# Patient Record
Sex: MALE | Race: BLACK OR AFRICAN AMERICAN | NOT HISPANIC OR LATINO
[De-identification: names, ages, dates, MRNs, and addresses within clinical notes are randomized per-mention and may not be internally consistent; named-entity substitution may affect disease eponyms.]

---

## 2020-07-21 ENCOUNTER — APPOINTMENT (OUTPATIENT)
Dept: UROLOGY | Facility: CLINIC | Age: 40
End: 2020-07-21

## 2020-09-02 ENCOUNTER — APPOINTMENT (OUTPATIENT)
Dept: UROLOGY | Facility: CLINIC | Age: 40
End: 2020-09-02
Payer: MEDICAID

## 2020-09-02 VITALS
SYSTOLIC BLOOD PRESSURE: 126 MMHG | TEMPERATURE: 97.8 F | HEART RATE: 111 BPM | DIASTOLIC BLOOD PRESSURE: 84 MMHG | WEIGHT: 129 LBS | HEIGHT: 70 IN | BODY MASS INDEX: 18.47 KG/M2

## 2020-09-02 DIAGNOSIS — Z80.9 FAMILY HISTORY OF MALIGNANT NEOPLASM, UNSPECIFIED: ICD-10-CM

## 2020-09-02 DIAGNOSIS — Z87.19 PERSONAL HISTORY OF OTHER DISEASES OF THE DIGESTIVE SYSTEM: ICD-10-CM

## 2020-09-02 DIAGNOSIS — Z87.39 PERSONAL HISTORY OF OTHER DISEASES OF THE MUSCULOSKELETAL SYSTEM AND CONNECTIVE TISSUE: ICD-10-CM

## 2020-09-02 DIAGNOSIS — Z78.9 OTHER SPECIFIED HEALTH STATUS: ICD-10-CM

## 2020-09-02 PROCEDURE — 99203 OFFICE O/P NEW LOW 30 MIN: CPT

## 2020-09-02 NOTE — HISTORY OF PRESENT ILLNESS
[FreeTextEntry1] : cc elevated psa \par 41 yo male referredfor eval elevated psa \par [id number]\par ha shad elevated psa in past in setting of uti \par no voidng complaints \par no fam h/o prostate ca\par per pmd h/o etoh abuse\par 1mo ago\par 5yr ago\par  PSA, Total Screen, Medicare <=4.0 ng/mL 14.3  6.8  \par

## 2020-09-02 NOTE — REVIEW OF SYSTEMS
[Chills] : chills [Recent Weight Loss (___ Lbs)] : recent [unfilled] ~Ulb weight loss [Feeling Tired] : feeling tired [Eyesight Problems] : eyesight problems [Dry Eyes] : dryness of the eyes [Earache] : earache [Chest Pain] : chest pain [Heart Rate Is Fast] : fast heart rate [Shortness Of Breath] : shortness of breath [Abdominal Pain] : abdominal pain [Cough] : cough [Vomiting] : vomiting [Constipation] : constipation [Sexually Transmitted Disease (STD)] : sexually transmitted disease [see HPI] : see HPI [Diarrhea] : diarrhea [Heartburn] : heartburn [Poor quality erections] : Poor quality erections [Strain or push to urinate] : strain or push to urinate [Anxiety] : anxiety [Wait a long time to urinate] : waits a long time to urinate [Easy Bruising] : a tendency for easy bruising [Negative] : Endocrine

## 2020-09-02 NOTE — ASSESSMENT
[FreeTextEntry1] : nl exam \par given h/o of elevated psa in setting og +ucx will check ua cx and repeat psa

## 2020-09-08 LAB
APPEARANCE: CLEAR
BACTERIA UR CULT: NORMAL
BACTERIA: NEGATIVE
BILIRUBIN URINE: NEGATIVE
BLOOD URINE: NEGATIVE
COLOR: YELLOW
GLUCOSE QUALITATIVE U: NEGATIVE
HYALINE CASTS: 0 /LPF
KETONES URINE: NEGATIVE
LEUKOCYTE ESTERASE URINE: ABNORMAL
MICROSCOPIC-UA: NORMAL
NITRITE URINE: NEGATIVE
PH URINE: 6.5
PROTEIN URINE: NEGATIVE
PSA FREE FLD-MCNC: 6 %
PSA FREE SERPL-MCNC: 0.63 NG/ML
PSA SERPL-MCNC: 11.2 NG/ML
RED BLOOD CELLS URINE: 1 /HPF
SPECIFIC GRAVITY URINE: 1.01
SQUAMOUS EPITHELIAL CELLS: 0 /HPF
UROBILINOGEN URINE: NORMAL
WHITE BLOOD CELLS URINE: 4 /HPF

## 2020-09-18 ENCOUNTER — APPOINTMENT (OUTPATIENT)
Dept: UROLOGY | Facility: CLINIC | Age: 40
End: 2020-09-18
Payer: MEDICAID

## 2020-09-18 PROCEDURE — 55700: CPT

## 2020-09-18 PROCEDURE — 76872 US TRANSRECTAL: CPT

## 2020-09-18 PROCEDURE — 76942 ECHO GUIDE FOR BIOPSY: CPT | Mod: 59

## 2020-09-25 ENCOUNTER — APPOINTMENT (OUTPATIENT)
Dept: UROLOGY | Facility: CLINIC | Age: 40
End: 2020-09-25
Payer: MEDICAID

## 2020-09-25 PROCEDURE — 99214 OFFICE O/P EST MOD 30 MIN: CPT

## 2020-09-25 NOTE — HISTORY OF PRESENT ILLNESS
[FreeTextEntry1] : s/p prostate biopsy \par doing well\par no voiding complaints \par no fever \par \par

## 2020-09-25 NOTE — ASSESSMENT
[FreeTextEntry1] : We had a long discussion with the patient explaining the diagnosis of prostate cancer and the meaning of his Ara Grade. We discussed all options of therapy, including observation, radiation therapy, endocrine therapy, and radical surgical extirpation.\par We discussed tumor progression and the natural course of prostate cancer, including metastases, especially to bone, and that observation would allow his prostate cancer to progress. We discussed radiation therapy, both external beam and interstitial radioactive seeds, and the morbidity involved, including cystitis, proctitis, urinary and incontinence and erectile dysfunction. We discussed endocrine therapy, that it would likely slow but not stop cancer progression, and the results of androgen ablation, including but not limited to bone density loss, muscle mass loss, changes in libido, mood, energy and depression. We discussed surgical therapy, including surgical techniques, outcomes and potential need for subsequent additional radiation therapy if positive surgical margins, and complications including infection and blood loss, and long term complications including urinary incontinence and erectile dysfunction.Staging will be performed with mri pelvis and whole body bone scans\par  \par .\par  \par Gave him material from Prostate cancer [facility] for review. Appropriate reerral were made. reviewed importance of following up with all referrals and exams\par  \par Greater than 50% of the encounter time was spent counseling the patient and I have spent 25 minutes face to face time with the patient. All questions were answered.\par

## 2020-09-29 LAB
ANION GAP SERPL CALC-SCNC: 18 MMOL/L
BUN SERPL-MCNC: 8 MG/DL
CALCIUM SERPL-MCNC: 9 MG/DL
CHLORIDE SERPL-SCNC: 102 MMOL/L
CO2 SERPL-SCNC: 24 MMOL/L
CORE LAB BIOPSY: NORMAL
CREAT SERPL-MCNC: 0.57 MG/DL
GLUCOSE SERPL-MCNC: 85 MG/DL
POTASSIUM SERPL-SCNC: 3.8 MMOL/L
SODIUM SERPL-SCNC: 144 MMOL/L

## 2020-10-16 ENCOUNTER — APPOINTMENT (OUTPATIENT)
Dept: UROLOGY | Facility: CLINIC | Age: 40
End: 2020-10-16
Payer: MEDICAID

## 2020-10-16 VITALS — TEMPERATURE: 97.1 F

## 2020-10-16 PROCEDURE — 99214 OFFICE O/P EST MOD 30 MIN: CPT

## 2020-10-16 NOTE — HISTORY OF PRESENT ILLNESS
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

## 2020-10-16 NOTE — ASSESSMENT
[FreeTextEntry1] : reviewed mri with patient \par reviewed treatment options as, surgery, xrt\par pt reluctant to have surgery - also complicated by prior surgery \par if decides AS advise repeat biopsy given risk for understaging with mri and psa results\par will see dr ramirez to review

## 2020-11-09 ENCOUNTER — APPOINTMENT (OUTPATIENT)
Dept: UROLOGY | Facility: CLINIC | Age: 40
End: 2020-11-09
Payer: MEDICAID

## 2020-11-09 VITALS
WEIGHT: 145 LBS | TEMPERATURE: 97.9 F | HEART RATE: 112 BPM | BODY MASS INDEX: 20.3 KG/M2 | RESPIRATION RATE: 18 BRPM | DIASTOLIC BLOOD PRESSURE: 111 MMHG | SYSTOLIC BLOOD PRESSURE: 158 MMHG | HEIGHT: 71 IN

## 2020-11-09 VITALS — TEMPERATURE: 97.9 F

## 2020-11-09 DIAGNOSIS — Z80.42 FAMILY HISTORY OF MALIGNANT NEOPLASM OF PROSTATE: ICD-10-CM

## 2020-11-09 DIAGNOSIS — Z87.19 PERSONAL HISTORY OF OTHER DISEASES OF THE DIGESTIVE SYSTEM: ICD-10-CM

## 2020-11-09 PROCEDURE — 99072 ADDL SUPL MATRL&STAF TM PHE: CPT

## 2020-11-09 PROCEDURE — 99215 OFFICE O/P EST HI 40 MIN: CPT

## 2020-11-09 RX ORDER — ARTIFICIAL TEARS 1; 2; 3 MG/ML; MG/ML; MG/ML
SOLUTION/ DROPS OPHTHALMIC
Refills: 0 | Status: ACTIVE | COMMUNITY

## 2020-11-09 RX ORDER — ALBUTEROL SULFATE 90 UG/1
108 (90 BASE) AEROSOL, METERED RESPIRATORY (INHALATION)
Refills: 0 | Status: ACTIVE | COMMUNITY

## 2020-11-09 RX ORDER — LEVETIRACETAM 1000 MG/1
TABLET, FILM COATED ORAL
Refills: 0 | Status: ACTIVE | COMMUNITY

## 2020-11-09 NOTE — DISEASE MANAGEMENT
[1] : T1 [c] : c [0] : N0 [X] : MX [10-20] : 10 - 20 ng/mL [6] : [place] Score 6 [] : Patient had a Prostate MRI [I] : I [BiopsyDate] : 9/18/2020 [TotalCores] : 12 [TotalPositiveCores] : 2 [MaxCoreInvolvement] : 10% [FreeTextEntry7] : PSA at diagnosis 11.2 ng/mL.\par MRI done at [facility]: reported as PIRAD 4 but no lesion detected.

## 2020-11-09 NOTE — HISTORY OF PRESENT ILLNESS
[FreeTextEntry1] : PSA September 2020 11.2 ng/ml \par Prostate biopsy [place] 3+3, 2/12 cores, 10% max involvement \par Grandfather lung cancer. \par Great uncles all 3 had prostate cancer \par Reports that urinary stream is okay. Denies any increased urgency or frequency. Denies any hematuria, dysuria or incontinence.\par Nocturia x 2\par Denies any difficulty with erections

## 2020-11-09 NOTE — LETTER CLOSING
[Consult Closing:] : Thank you for allowing me to participate in the care of this patient.  If you have any questions, please do not hesitate to contact me. [Sincerely yours,] : Sincerely yours, [FreeTextEntry3] : Ravin Alvarado MD\par System Chief of Urology, [facility]\par  of Urology\par [employer] [facility]\par [facility] \par [address] [place] [address], [address] \[place] [address]

## 2020-11-09 NOTE — DISEASE MANAGEMENT
[1] : T1 [c] : c [0] : N0 [X] : MX [10-20] : 10 - 20 ng/mL [6] : [place] Score 6 [] : Patient had a Prostate MRI [I] : I [BiopsyDate] : 9/18/2020 [TotalCores] : 12 [TotalPositiveCores] : 2 [MaxCoreInvolvement] : 10% [FreeTextEntry7] : PSA at diagnosis 11.2 ng/mL.\par MRI done at [facility]: reported as PIRAD 4 but no lesion detected.

## 2020-11-09 NOTE — HISTORY OF PRESENT ILLNESS
[FreeTextEntry1] : PSA September 2020 11.2 ng/ml \par Prostate biopsy [place] 3+3, 2/12 cores, 10% max involvement \par Grandfather lung cancer. \par Great uncles all 3 had prostate cancer \par Reports that urinary stream is okay. Denies any increased urgency or frequency. Denies any hematuria, dysuria or incontinence.\par Nocturia x 2\par Denies any difficulty with erections

## 2020-11-09 NOTE — LETTER CLOSING
[Consult Closing:] : Thank you for allowing me to participate in the care of this patient.  If you have any questions, please do not hesitate to contact me. [Sincerely yours,] : Sincerely yours, [FreeTextEntry3] : Ravin Alvarado MD\par System Chief of Urology, [facility] [place]\par  of Urology\par [facility] at [facility]\par [facility] \par [address] [place] [address], [address] \[place] [address]

## 2020-11-09 NOTE — REASON FOR VISIT
[Follow-up Visit ___] : a follow-up visit  for [unfilled] [Consideration of Curative Therapy] : consideration of curative therapy for prostate cancer

## 2020-11-11 ENCOUNTER — APPOINTMENT (OUTPATIENT)
Dept: RADIATION ONCOLOGY | Facility: CLINIC | Age: 40
End: 2020-11-11
Payer: MEDICAID

## 2020-11-11 PROCEDURE — 99072 ADDL SUPL MATRL&STAF TM PHE: CPT

## 2020-11-11 PROCEDURE — 99205 OFFICE O/P NEW HI 60 MIN: CPT

## 2020-11-11 NOTE — HISTORY OF PRESENT ILLNESS
[FreeTextEntry1] : 41 yo male for telephone consult for discussion of radiation options for treatment of low/intermediate risk prostate cancer\par \par Presented with elevated screening PSA from primary care\par \par PSA 11.2ng/mL\par G3+3 in 2/12 cores, max core involvement 10%\par MRI done at [facility]: PIRADS4, no lesion identified, prostate size 34g, no extracapsular extension, no seminal vesicle involvement, no adenopathy.\par \par Symptoms: occasional obstructive stream, nocturia x1 per night, no constipation or diarrhoea but previous colonic resection for diverticulitis requiring a short term colostomy (now reversed), no issues with erectile function.\par \par PMH:\par Epilepsy (on Keppra)\par Diverticulitis with colonic resection requiring short term colostomy (now reversed)\par \par SH:\par Lives with mother in [place]\par Unemployed\par Current smoker\par One child, would like to have more

## 2020-11-13 ENCOUNTER — APPOINTMENT (OUTPATIENT)
Dept: RADIATION ONCOLOGY | Facility: CLINIC | Age: 40
End: 2020-11-13
Payer: MEDICAID

## 2020-11-13 ENCOUNTER — NON-APPOINTMENT (OUTPATIENT)
Age: 40
End: 2020-11-13

## 2020-11-13 PROCEDURE — 99072 ADDL SUPL MATRL&STAF TM PHE: CPT

## 2020-11-13 PROCEDURE — 99214 OFFICE O/P EST MOD 30 MIN: CPT

## 2020-11-13 RX ORDER — 70%ISOPROPYL ALCOHOL 0.7 ML/ML
70 SWAB TOPICAL
Refills: 0 | Status: ACTIVE | COMMUNITY

## 2020-11-13 NOTE — PHYSICAL EXAM
[Normal] : normoactive bowel sounds, soft and nontender, no hepatosplenomegaly or masses appreciated [Normal] : normal external genitalia without lesions and no testicular masses [de-identified] : prominent scar above left eye brow

## 2020-11-13 NOTE — REASON FOR VISIT
[Consideration of Curative Therapy] : consideration of curative therapy for [Prostate Cancer] : prostate cancer [Family Member] : family member

## 2020-11-13 NOTE — HISTORY OF PRESENT ILLNESS
[FreeTextEntry1] : 41 yo male for telephone consult for discussion of radiation options for treatment of low/intermediate risk prostate cancer\par \par Presented with elevated screening PSA from primary care\par \par PSA 11.2ng/mL in 9/2020, risen to 17.7 11/9/20 \par G3+3 in 2/12 cores, max core involvement 10%\par MRI done at [facility]: PIRADS4, no lesion identified, prostate size 34g, no extracapsular extension, no seminal vesicle involvement, no adenopathy.\par \par Symptoms: occasional obstructive stream, nocturia x1 per night, no constipation or diarrhoea but previous colonic resection for diverticulitis requiring a short term colostomy (now reversed), no issues with erectile function.\par \par PMH:\par Epilepsy (on Keppra)\par Diverticulitis with colonic resection requiring short term colostomy (now reversed)\par \par SH:\par Lives with mother in [place]\par Unemployed\par Current smoker\par One child, would like to have more

## 2020-11-13 NOTE — REVIEW OF SYSTEMS
[IPSS Score (0-40): ___] : IPSS score: [unfilled] [EPIC-CP Score (0-60): ___] : EPIC-CP score: [unfilled] [Negative] : Allergic/Immunologic [Anal Pain: Grade 0] : Anal Pain: Grade 0 [Constipation: Grade 0] : Constipation: Grade 0 [Diarrhea: Grade 0] : Diarrhea: Grade 0 [Dyspepsia: Grade 0] : Dyspepsia: Grade 0 [Dysphagia: Grade 0] : Dysphagia: Grade 0 [Esophagitis: Grade 0] : Esophagitis: Grade 0 [Fecal Incontinence: Grade 0] : Fecal Incontinence: Grade 0 [Gastroparesis: Grade 0] : Gastroparesis: Grade 0 [Nausea: Grade 0] : Nausea: Grade 0 [Proctitis: Grade 0] : Proctitis: Grade 0 [Rectal Pain: Grade 0] : Rectal Pain: Grade 0 [Small Intestinal Obstruction: Grade 0] : Small Intestinal Obstruction: Grade 0 [Vomiting: Grade 0] : Vomiting: Grade 0 [Hematuria: Grade 0] : Hematuria: Grade 0 [Urinary Incontinence: Grade 0] : Urinary Incontinence: Grade 0  [Urinary Retention: Grade 1 - Urinary, suprapubic or intermittent catheter placement not indicated; able to void with some residual] : Urinary Retention: Grade 1 - Urinary, suprapubic or intermittent catheter placement not indicated; able to void with some residual [Urinary Tract Pain: Grade 0] : Urinary Tract Pain: Grade 0 [Urinary Urgency: Grade 0] : Urinary Urgency: Grade 0 [Urinary Frequency: Grade 1 - Present] : Urinary Frequency: Grade 1 - Present [Ejaculation Disorder: Grade 1 - Diminished ejaculation] : Ejaculation Disorder: Grade 1 - Diminished ejaculation  [Erectile Dysfunction: Grade 1 - Decrease in erectile function (frequency or rigidity of erections) but intervention not indicated (e.g., medication or use of mechanical device, penile pump)] : Erectile Dysfunction: Grade 1 - Decrease in erectile function (frequency or rigidity of erections) but intervention not indicated (e.g., medication or use of mechanical device, penile pump) [FreeTextEntry8] : prostate cancer

## 2020-11-16 LAB
PSA FREE FLD-MCNC: 4 %
PSA FREE SERPL-MCNC: 0.62 NG/ML
PSA SERPL-MCNC: 17.7 NG/ML

## 2021-01-11 ENCOUNTER — OUTPATIENT (OUTPATIENT)
Dept: OUTPATIENT SERVICES | Facility: HOSPITAL | Age: 41
LOS: 1 days | End: 2021-01-11
Payer: MEDICAID

## 2021-01-11 ENCOUNTER — APPOINTMENT (OUTPATIENT)
Dept: CT IMAGING | Facility: IMAGING CENTER | Age: 41
End: 2021-01-11
Payer: MEDICAID

## 2021-01-11 DIAGNOSIS — C61 MALIGNANT NEOPLASM OF PROSTATE: ICD-10-CM

## 2021-01-11 PROCEDURE — 74177 CT ABD & PELVIS W/CONTRAST: CPT | Mod: 26

## 2021-01-11 PROCEDURE — 74177 CT ABD & PELVIS W/CONTRAST: CPT

## 2021-01-23 DIAGNOSIS — K76.9 LIVER DISEASE, UNSPECIFIED: ICD-10-CM

## 2021-03-01 ENCOUNTER — APPOINTMENT (OUTPATIENT)
Dept: NUCLEAR MEDICINE | Facility: IMAGING CENTER | Age: 41
End: 2021-03-01
Payer: MEDICAID

## 2021-03-01 ENCOUNTER — RESULT REVIEW (OUTPATIENT)
Age: 41
End: 2021-03-01

## 2021-03-01 ENCOUNTER — OUTPATIENT (OUTPATIENT)
Dept: OUTPATIENT SERVICES | Facility: HOSPITAL | Age: 41
LOS: 1 days | End: 2021-03-01
Payer: MEDICAID

## 2021-03-01 DIAGNOSIS — K76.9 LIVER DISEASE, UNSPECIFIED: ICD-10-CM

## 2021-03-01 PROCEDURE — 78306 BONE IMAGING WHOLE BODY: CPT | Mod: 26

## 2021-03-01 PROCEDURE — A9561: CPT

## 2021-03-01 PROCEDURE — 78306 BONE IMAGING WHOLE BODY: CPT

## 2021-03-06 ENCOUNTER — APPOINTMENT (OUTPATIENT)
Dept: CT IMAGING | Facility: IMAGING CENTER | Age: 41
End: 2021-03-06

## 2021-03-10 ENCOUNTER — OUTPATIENT (OUTPATIENT)
Dept: OUTPATIENT SERVICES | Facility: HOSPITAL | Age: 41
LOS: 1 days | End: 2021-03-10
Payer: MEDICAID

## 2021-03-10 ENCOUNTER — APPOINTMENT (OUTPATIENT)
Dept: MRI IMAGING | Facility: CLINIC | Age: 41
End: 2021-03-10
Payer: MEDICAID

## 2021-03-10 ENCOUNTER — RESULT REVIEW (OUTPATIENT)
Age: 41
End: 2021-03-10

## 2021-03-10 DIAGNOSIS — K76.9 LIVER DISEASE, UNSPECIFIED: ICD-10-CM

## 2021-03-10 PROCEDURE — 74183 MRI ABD W/O CNTR FLWD CNTR: CPT | Mod: 26

## 2021-03-10 PROCEDURE — 74183 MRI ABD W/O CNTR FLWD CNTR: CPT

## 2021-03-10 PROCEDURE — A9585: CPT

## 2021-03-12 DIAGNOSIS — Z87.898 PERSONAL HISTORY OF OTHER SPECIFIED CONDITIONS: ICD-10-CM

## 2021-03-18 ENCOUNTER — APPOINTMENT (OUTPATIENT)
Dept: UROLOGY | Facility: CLINIC | Age: 41
End: 2021-03-18
Payer: MEDICAID

## 2021-03-18 VITALS
RESPIRATION RATE: 18 BRPM | HEIGHT: 71 IN | BODY MASS INDEX: 20.3 KG/M2 | WEIGHT: 145 LBS | TEMPERATURE: 97.2 F | DIASTOLIC BLOOD PRESSURE: 80 MMHG | SYSTOLIC BLOOD PRESSURE: 120 MMHG | HEART RATE: 99 BPM

## 2021-03-18 PROCEDURE — 99072 ADDL SUPL MATRL&STAF TM PHE: CPT

## 2021-03-18 PROCEDURE — 99215 OFFICE O/P EST HI 40 MIN: CPT

## 2021-03-18 NOTE — HISTORY OF PRESENT ILLNESS
[FreeTextEntry1] : PSA September 2020 11.2 ng/ml \par Prostate biopsy [place] 3+3, 2/12 cores, 10% max involvement \par Grandfather lung cancer. \par Great uncles all 3 had prostate cancer \par Repeat PSA Nov 2020: 17.7 ng/mL\par \par Metastatic workup completed now, including CT and bone scan. No evidence of adenopathy or metastasis.\par \par Reports that urinary stream is okay. Denies any increased urgency or frequency. Denies any hematuria, dysuria or incontinence. Nocturia x 2.\par Denies any difficulty with erections

## 2021-03-18 NOTE — PHYSICAL EXAM
[General Appearance - Well Developed] : well developed [General Appearance - Well Nourished] : well nourished [Normal Appearance] : normal appearance [Well Groomed] : well groomed [General Appearance - In No Acute Distress] : no acute distress [Abdomen Soft] : soft [Abdomen Tenderness] : non-tender [Costovertebral Angle Tenderness] : no ~M costovertebral angle tenderness [Urinary Bladder Findings] : the bladder was normal on palpation [FreeTextEntry1] : Large midline incision with healed ostomy site.

## 2021-03-18 NOTE — ASSESSMENT
[FreeTextEntry1] : Long discussion with the patient today.  Spoke to his mom via phone.\par Reviewed imaging studies including CT scan, bone scan, MRI of the abdomen.  No evidence of metastatic disease.  Recommended the patient proceed with radical prostatectomy for definitive treatment of his prostate cancer.  We discussed the procedure as well as the potential long-term risks including stress incontinence and erectile dysfunction.  Given his prior abdominal surgery, unlikely to be able to have a minimally invasive procedure.  Therefore, recommend an open retropubic radical prostatectomy. Reviewed operative procedure, hospital stay and recovery time.  Risks of surgery discussed including risks of bleeding (both immediate and delayed), urine leak, wound/abdominal infection, adjacent organ injury (bowel/vascular), DVT/PE, and anesthetic complications.  \par \par All questions answered.  Patient agrees to proceed as recommended.\par

## 2021-04-21 LAB
ALBUMIN SERPL ELPH-MCNC: 4.8 G/DL
ALP BLD-CCNC: 60 U/L
ALT SERPL-CCNC: 10 U/L
ANION GAP SERPL CALC-SCNC: 13 MMOL/L
AST SERPL-CCNC: 23 U/L
BASOPHILS # BLD AUTO: 0.03 K/UL
BASOPHILS NFR BLD AUTO: 0.8 %
BILIRUB SERPL-MCNC: 0.5 MG/DL
BUN SERPL-MCNC: 6 MG/DL
CALCIUM SERPL-MCNC: 9 MG/DL
CHLORIDE SERPL-SCNC: 101 MMOL/L
CO2 SERPL-SCNC: 27 MMOL/L
CREAT SERPL-MCNC: 0.51 MG/DL
EOSINOPHIL # BLD AUTO: 0.08 K/UL
EOSINOPHIL NFR BLD AUTO: 2.1 %
GLUCOSE SERPL-MCNC: 90 MG/DL
HCT VFR BLD CALC: 38.5 %
HGB BLD-MCNC: 12.2 G/DL
IMM GRANULOCYTES NFR BLD AUTO: 0.3 %
LYMPHOCYTES # BLD AUTO: 0.91 K/UL
LYMPHOCYTES NFR BLD AUTO: 24 %
MAN DIFF?: NORMAL
MCHC RBC-ENTMCNC: 29.9 PG
MCHC RBC-ENTMCNC: 31.7 GM/DL
MCV RBC AUTO: 94.4 FL
MONOCYTES # BLD AUTO: 0.53 K/UL
MONOCYTES NFR BLD AUTO: 14 %
NEUTROPHILS # BLD AUTO: 2.23 K/UL
NEUTROPHILS NFR BLD AUTO: 58.8 %
PLATELET # BLD AUTO: 283 K/UL
POTASSIUM SERPL-SCNC: 4.3 MMOL/L
PROT SERPL-MCNC: 7.3 G/DL
PSA SERPL-MCNC: 14.4 NG/ML
RBC # BLD: 4.08 M/UL
RBC # FLD: 14.9 %
SODIUM SERPL-SCNC: 141 MMOL/L
TESTOST SERPL-MCNC: 351 NG/DL
WBC # FLD AUTO: 3.79 K/UL

## 2021-05-20 ENCOUNTER — OUTPATIENT (OUTPATIENT)
Dept: OUTPATIENT SERVICES | Facility: HOSPITAL | Age: 41
LOS: 1 days | End: 2021-05-20
Payer: MEDICAID

## 2021-05-20 VITALS
DIASTOLIC BLOOD PRESSURE: 80 MMHG | TEMPERATURE: 97 F | WEIGHT: 128.09 LBS | SYSTOLIC BLOOD PRESSURE: 110 MMHG | RESPIRATION RATE: 16 BRPM | HEIGHT: 70 IN | OXYGEN SATURATION: 98 % | HEART RATE: 90 BPM

## 2021-05-20 DIAGNOSIS — J45.909 UNSPECIFIED ASTHMA, UNCOMPLICATED: ICD-10-CM

## 2021-05-20 DIAGNOSIS — C61 MALIGNANT NEOPLASM OF PROSTATE: ICD-10-CM

## 2021-05-20 DIAGNOSIS — I10 ESSENTIAL (PRIMARY) HYPERTENSION: ICD-10-CM

## 2021-05-20 DIAGNOSIS — Z98.890 OTHER SPECIFIED POSTPROCEDURAL STATES: Chronic | ICD-10-CM

## 2021-05-20 DIAGNOSIS — Z20.822 CONTACT WITH AND (SUSPECTED) EXPOSURE TO COVID-19: ICD-10-CM

## 2021-05-20 DIAGNOSIS — Z87.828 PERSONAL HISTORY OF OTHER (HEALED) PHYSICAL INJURY AND TRAUMA: Chronic | ICD-10-CM

## 2021-05-20 DIAGNOSIS — R56.9 UNSPECIFIED CONVULSIONS: ICD-10-CM

## 2021-05-20 DIAGNOSIS — F10.10 ALCOHOL ABUSE, UNCOMPLICATED: ICD-10-CM

## 2021-05-20 LAB
ALBUMIN SERPL ELPH-MCNC: 4.8 G/DL — SIGNIFICANT CHANGE UP (ref 3.3–5)
ALP SERPL-CCNC: 74 U/L — SIGNIFICANT CHANGE UP (ref 40–120)
ALT FLD-CCNC: 18 U/L — SIGNIFICANT CHANGE UP (ref 4–41)
ANION GAP SERPL CALC-SCNC: 22 MMOL/L — HIGH (ref 7–14)
AST SERPL-CCNC: 83 U/L — HIGH (ref 4–40)
BILIRUB SERPL-MCNC: 0.8 MG/DL — SIGNIFICANT CHANGE UP (ref 0.2–1.2)
BLD GP AB SCN SERPL QL: NEGATIVE — SIGNIFICANT CHANGE UP
BUN SERPL-MCNC: 10 MG/DL — SIGNIFICANT CHANGE UP (ref 7–23)
CALCIUM SERPL-MCNC: 9.1 MG/DL — SIGNIFICANT CHANGE UP (ref 8.4–10.5)
CHLORIDE SERPL-SCNC: 98 MMOL/L — SIGNIFICANT CHANGE UP (ref 98–107)
CO2 SERPL-SCNC: 20 MMOL/L — LOW (ref 22–31)
CREAT SERPL-MCNC: 0.56 MG/DL — SIGNIFICANT CHANGE UP (ref 0.5–1.3)
GLUCOSE SERPL-MCNC: 69 MG/DL — LOW (ref 70–99)
HCT VFR BLD CALC: 36.2 % — LOW (ref 39–50)
HGB BLD-MCNC: 11.8 G/DL — LOW (ref 13–17)
MCHC RBC-ENTMCNC: 28.9 PG — SIGNIFICANT CHANGE UP (ref 27–34)
MCHC RBC-ENTMCNC: 32.6 GM/DL — SIGNIFICANT CHANGE UP (ref 32–36)
MCV RBC AUTO: 88.7 FL — SIGNIFICANT CHANGE UP (ref 80–100)
NRBC # BLD: 0 /100 WBCS — SIGNIFICANT CHANGE UP
NRBC # FLD: 0 K/UL — SIGNIFICANT CHANGE UP
PLATELET # BLD AUTO: 287 K/UL — SIGNIFICANT CHANGE UP (ref 150–400)
POTASSIUM SERPL-MCNC: 3.6 MMOL/L — SIGNIFICANT CHANGE UP (ref 3.5–5.3)
POTASSIUM SERPL-SCNC: 3.6 MMOL/L — SIGNIFICANT CHANGE UP (ref 3.5–5.3)
PROT SERPL-MCNC: 8 G/DL — SIGNIFICANT CHANGE UP (ref 6–8.3)
RBC # BLD: 4.08 M/UL — LOW (ref 4.2–5.8)
RBC # FLD: 15.5 % — HIGH (ref 10.3–14.5)
RH IG SCN BLD-IMP: POSITIVE — SIGNIFICANT CHANGE UP
SODIUM SERPL-SCNC: 140 MMOL/L — SIGNIFICANT CHANGE UP (ref 135–145)
WBC # BLD: 3.49 K/UL — LOW (ref 3.8–10.5)
WBC # FLD AUTO: 3.49 K/UL — LOW (ref 3.8–10.5)

## 2021-05-20 PROCEDURE — 93010 ELECTROCARDIOGRAM REPORT: CPT

## 2021-05-20 NOTE — H&P PST ADULT - NSANTHOSAYNRD_GEN_A_CORE
No. ESTEFANI screening performed.  STOP BANG Legend: 0-2 = LOW Risk; 3-4 = INTERMEDIATE Risk; 5-8 = HIGH Risk

## 2021-05-20 NOTE — H&P PST ADULT - NSICDXPASTMEDICALHX_GEN_ALL_CORE_FT
PAST MEDICAL HISTORY:  Asthma     Malignant neoplasm of prostate     Seizure      PAST MEDICAL HISTORY:  Asthma     Hypertension     Malignant neoplasm of prostate     Seizure

## 2021-05-20 NOTE — H&P PST ADULT - HISTORY OF PRESENT ILLNESS
42 yo male presents to [facility] for preop evaluation for open radical retropubic prostatectomy, pelvic lymph node dissection.  Patient  with elevated PSA levels s/p prostate biopsy and diagnosed with malignant neoplasm of prostate.  Patient reports increased urinary frequency, denies dysuria or hematuria.

## 2021-05-20 NOTE — H&P PST ADULT - NSICDXPROBLEM_GEN_ALL_CORE_FT
PROBLEM DIAGNOSES  Problem: Malignant neoplasm of prostate  Assessment and Plan: Patient scheduled for open radical retropubic prostatectomy, pelvic lymph node dissection on 6/4/2021  Written & verbal preop instructions, gi prophylaxis & surgical soap given  Pt verbalized good understanding.  Teach back done on surgical soap instructions.   Patient had preop Medical evaluation with PCP, pending copy of report.     Problem: Seizures  Assessment and Plan: Patient instructed to take Keppra on AM of surgery    Problem: Asthma  Assessment and Plan: Patient instructed to take Albuterol as needed on AM of surgery       PROBLEM DIAGNOSES  Problem: Malignant neoplasm of prostate  Assessment and Plan: Patient scheduled for open radical retropubic prostatectomy, pelvic lymph node dissection on 6/4/2021  Written & verbal preop instructions, gi prophylaxis & surgical soap given  Pt verbalized good understanding.  Teach back done on surgical soap instructions.   Patient has preop Medical evaluation with PCP, pending copy of report.     Problem: Seizures  Assessment and Plan: Patient instructed to take Keppra on AM of surgery    Problem: Asthma  Assessment and Plan: Patient instructed to take Albuterol as needed on AM of surgery    Problem: Hypertension  Assessment and Plan: Patient instructed to take Metoprolol as per routine schedule    Problem: Excessive drinking alcohol  Assessment and Plan: Patient reports alcohol consumption daily 2 drinks of liquor, PCP notified, patient declines  consult    Problem: Encounter for screening laboratory testing for COVID-19 virus  Assessment and Plan: Patient aware of need for COVID testing prior to procedure and advised to co ordinate with surgeon.        PROBLEM DIAGNOSES  Problem: Malignant neoplasm of prostate  Assessment and Plan: Patient scheduled for open radical retropubic prostatectomy, pelvic lymph node dissection on 6/4/2021  Written & verbal preop instructions, gi prophylaxis & surgical soap given  Pt verbalized good understanding.  Teach back done on surgical soap instructions.   Patient has preop Medical evaluation with PCP, pending copy of report.   ESTEFANI precaution, OR booking notified    Problem: Seizures  Assessment and Plan: Patient instructed to take Keppra on AM of surgery    Problem: Asthma  Assessment and Plan: Patient instructed to take Albuterol as needed on AM of surgery    Problem: Hypertension  Assessment and Plan: Patient instructed to take Metoprolol as per routine schedule    Problem: Excessive drinking alcohol  Assessment and Plan: Patient reports alcohol consumption daily 2 drinks of liquor, PCP notified, patient declines  consult    Problem: Encounter for screening laboratory testing for COVID-19 virus  Assessment and Plan: Patient aware of need for COVID testing prior to procedure and advised to co ordinate with surgeon.

## 2021-05-20 NOTE — H&P PST ADULT - NSICDXPASTSURGICALHX_GEN_ALL_CORE_FT
PAST SURGICAL HISTORY:  H/O laceration of skin     History of colon surgery     S/P hernia surgery

## 2021-05-20 NOTE — H&P PST ADULT - NSALCOHOLMD_GEN_A_CORE_SD
spoke with nurse Crandall from Dr. Lyles's office will notify physician, patient declines social work consult/Name of MD Notified: (Please Specify)

## 2021-05-20 NOTE — H&P PST ADULT - RESPIRATORY AND THORAX COMMENTS
pt with history fo asthma pt with history fo asthma well controlled on medication, no recent exacerbation

## 2021-05-20 NOTE — H&P PST ADULT - NEGATIVE NEUROLOGICAL SYMPTOMS
no transient paralysis/no weakness/no paresthesias/no generalized seizures/no focal seizures/no syncope/no tremors/no vertigo/no difficulty walking/no headache/no loss of consciousness

## 2021-05-28 ENCOUNTER — OUTPATIENT (OUTPATIENT)
Dept: OUTPATIENT SERVICES | Facility: HOSPITAL | Age: 41
LOS: 1 days | End: 2021-05-28

## 2021-05-28 DIAGNOSIS — C61 MALIGNANT NEOPLASM OF PROSTATE: ICD-10-CM

## 2021-05-28 DIAGNOSIS — Z98.890 OTHER SPECIFIED POSTPROCEDURAL STATES: Chronic | ICD-10-CM

## 2021-05-28 DIAGNOSIS — Z87.828 PERSONAL HISTORY OF OTHER (HEALED) PHYSICAL INJURY AND TRAUMA: Chronic | ICD-10-CM

## 2021-05-28 LAB
APTT BLD: 36.1 SEC — SIGNIFICANT CHANGE UP (ref 27–36.3)
INR BLD: 1.11 RATIO — SIGNIFICANT CHANGE UP (ref 0.88–1.16)
PROTHROM AB SERPL-ACNC: 12.6 SEC — SIGNIFICANT CHANGE UP (ref 10.6–13.6)

## 2021-05-31 DIAGNOSIS — Z01.818 ENCOUNTER FOR OTHER PREPROCEDURAL EXAMINATION: ICD-10-CM

## 2021-06-01 ENCOUNTER — OUTPATIENT (OUTPATIENT)
Dept: OUTPATIENT SERVICES | Facility: HOSPITAL | Age: 41
LOS: 1 days | End: 2021-06-01
Payer: MEDICAID

## 2021-06-01 ENCOUNTER — APPOINTMENT (OUTPATIENT)
Dept: DISASTER EMERGENCY | Facility: CLINIC | Age: 41
End: 2021-06-01

## 2021-06-01 DIAGNOSIS — Z87.828 PERSONAL HISTORY OF OTHER (HEALED) PHYSICAL INJURY AND TRAUMA: Chronic | ICD-10-CM

## 2021-06-01 DIAGNOSIS — Z98.890 OTHER SPECIFIED POSTPROCEDURAL STATES: Chronic | ICD-10-CM

## 2021-06-02 LAB — SARS-COV-2 N GENE NPH QL NAA+PROBE: NOT DETECTED

## 2021-06-03 ENCOUNTER — TRANSCRIPTION ENCOUNTER (OUTPATIENT)
Age: 41
End: 2021-06-03

## 2021-06-03 NOTE — ASU PATIENT PROFILE, ADULT - VISION (WITH CORRECTIVE LENSES IF THE PATIENT USUALLY WEARS THEM):
To get better and follow your care plan as instructed. Normal vision: sees adequately in most situations; can see medication labels, newsprint

## 2021-06-04 ENCOUNTER — APPOINTMENT (OUTPATIENT)
Dept: UROLOGY | Facility: HOSPITAL | Age: 41
End: 2021-06-04

## 2021-06-04 ENCOUNTER — INPATIENT (INPATIENT)
Facility: HOSPITAL | Age: 41
LOS: 7 days | Discharge: LEFT BEFORE DISCHARGE | End: 2021-06-12
Attending: UROLOGY | Admitting: UROLOGY
Payer: MEDICAID

## 2021-06-04 ENCOUNTER — RESULT REVIEW (OUTPATIENT)
Age: 41
End: 2021-06-04

## 2021-06-04 VITALS
RESPIRATION RATE: 16 BRPM | DIASTOLIC BLOOD PRESSURE: 88 MMHG | HEIGHT: 70 IN | OXYGEN SATURATION: 95 % | HEART RATE: 95 BPM | SYSTOLIC BLOOD PRESSURE: 108 MMHG | WEIGHT: 128.09 LBS | TEMPERATURE: 98 F

## 2021-06-04 DIAGNOSIS — Z98.890 OTHER SPECIFIED POSTPROCEDURAL STATES: Chronic | ICD-10-CM

## 2021-06-04 DIAGNOSIS — Z87.828 PERSONAL HISTORY OF OTHER (HEALED) PHYSICAL INJURY AND TRAUMA: Chronic | ICD-10-CM

## 2021-06-04 DIAGNOSIS — C61 MALIGNANT NEOPLASM OF PROSTATE: ICD-10-CM

## 2021-06-04 LAB
ALBUMIN SERPL ELPH-MCNC: 4.1 G/DL — SIGNIFICANT CHANGE UP (ref 3.3–5)
ALP SERPL-CCNC: 71 U/L — SIGNIFICANT CHANGE UP (ref 40–120)
ALT FLD-CCNC: 20 U/L — SIGNIFICANT CHANGE UP (ref 4–41)
ANION GAP SERPL CALC-SCNC: 22 MMOL/L — HIGH (ref 7–14)
ANION GAP SERPL CALC-SCNC: 23 MMOL/L — HIGH (ref 7–14)
APTT BLD: 31.1 SEC — SIGNIFICANT CHANGE UP (ref 27–36.3)
AST SERPL-CCNC: 64 U/L — HIGH (ref 4–40)
BASOPHILS # BLD AUTO: 0 K/UL — SIGNIFICANT CHANGE UP (ref 0–0.2)
BASOPHILS NFR BLD AUTO: 0 % — SIGNIFICANT CHANGE UP (ref 0–2)
BILIRUB SERPL-MCNC: 2.1 MG/DL — HIGH (ref 0.2–1.2)
BLOOD GAS ARTERIAL - LYTES,HGB,ICA,LACT RESULT: SIGNIFICANT CHANGE UP
BUN SERPL-MCNC: 4 MG/DL — LOW (ref 7–23)
BUN SERPL-MCNC: 4 MG/DL — LOW (ref 7–23)
CALCIUM SERPL-MCNC: 10.2 MG/DL — SIGNIFICANT CHANGE UP (ref 8.4–10.5)
CALCIUM SERPL-MCNC: 9.9 MG/DL — SIGNIFICANT CHANGE UP (ref 8.4–10.5)
CHLORIDE SERPL-SCNC: 106 MMOL/L — SIGNIFICANT CHANGE UP (ref 98–107)
CHLORIDE SERPL-SCNC: 107 MMOL/L — SIGNIFICANT CHANGE UP (ref 98–107)
CO2 SERPL-SCNC: 17 MMOL/L — LOW (ref 22–31)
CO2 SERPL-SCNC: 18 MMOL/L — LOW (ref 22–31)
CREAT SERPL-MCNC: 0.4 MG/DL — LOW (ref 0.5–1.3)
CREAT SERPL-MCNC: 0.42 MG/DL — LOW (ref 0.5–1.3)
EOSINOPHIL # BLD AUTO: 0 K/UL — SIGNIFICANT CHANGE UP (ref 0–0.5)
EOSINOPHIL NFR BLD AUTO: 0 % — SIGNIFICANT CHANGE UP (ref 0–6)
GAS PNL BLDA: SIGNIFICANT CHANGE UP
GLUCOSE SERPL-MCNC: 133 MG/DL — HIGH (ref 70–99)
GLUCOSE SERPL-MCNC: 135 MG/DL — HIGH (ref 70–99)
HCT VFR BLD CALC: 36.7 % — LOW (ref 39–50)
HCT VFR BLD CALC: 39.4 % — SIGNIFICANT CHANGE UP (ref 39–50)
HGB BLD-MCNC: 13 G/DL — SIGNIFICANT CHANGE UP (ref 13–17)
HGB BLD-MCNC: 13.4 G/DL — SIGNIFICANT CHANGE UP (ref 13–17)
IANC: 9.27 K/UL — HIGH (ref 1.5–8.5)
INR BLD: 1.2 RATIO — HIGH (ref 0.88–1.16)
LYMPHOCYTES # BLD AUTO: 0.34 K/UL — LOW (ref 1–3.3)
LYMPHOCYTES # BLD AUTO: 3.5 % — LOW (ref 13–44)
MAGNESIUM SERPL-MCNC: 0.6 MG/DL — CRITICAL LOW (ref 1.6–2.6)
MAGNESIUM SERPL-MCNC: 0.8 MG/DL — CRITICAL LOW (ref 1.6–2.6)
MCHC RBC-ENTMCNC: 28.4 PG — SIGNIFICANT CHANGE UP (ref 27–34)
MCHC RBC-ENTMCNC: 28.6 PG — SIGNIFICANT CHANGE UP (ref 27–34)
MCHC RBC-ENTMCNC: 34 GM/DL — SIGNIFICANT CHANGE UP (ref 32–36)
MCHC RBC-ENTMCNC: 35.4 GM/DL — SIGNIFICANT CHANGE UP (ref 32–36)
MCV RBC AUTO: 80.3 FL — SIGNIFICANT CHANGE UP (ref 80–100)
MCV RBC AUTO: 84 FL — SIGNIFICANT CHANGE UP (ref 80–100)
MONOCYTES # BLD AUTO: 0.09 K/UL — SIGNIFICANT CHANGE UP (ref 0–0.9)
MONOCYTES NFR BLD AUTO: 0.9 % — LOW (ref 2–14)
NEUTROPHILS # BLD AUTO: 9.24 K/UL — HIGH (ref 1.8–7.4)
NEUTROPHILS NFR BLD AUTO: 92.1 % — HIGH (ref 43–77)
NRBC # BLD: 0 /100 WBCS — SIGNIFICANT CHANGE UP
NRBC # FLD: 0.02 K/UL — HIGH
PHOSPHATE SERPL-MCNC: 3.8 MG/DL — SIGNIFICANT CHANGE UP (ref 2.5–4.5)
PHOSPHATE SERPL-MCNC: 3.8 MG/DL — SIGNIFICANT CHANGE UP (ref 2.5–4.5)
PLATELET # BLD AUTO: 189 K/UL — SIGNIFICANT CHANGE UP (ref 150–400)
PLATELET # BLD AUTO: 190 K/UL — SIGNIFICANT CHANGE UP (ref 150–400)
POTASSIUM SERPL-MCNC: 3.4 MMOL/L — LOW (ref 3.5–5.3)
POTASSIUM SERPL-MCNC: 3.5 MMOL/L — SIGNIFICANT CHANGE UP (ref 3.5–5.3)
POTASSIUM SERPL-SCNC: 3.4 MMOL/L — LOW (ref 3.5–5.3)
POTASSIUM SERPL-SCNC: 3.5 MMOL/L — SIGNIFICANT CHANGE UP (ref 3.5–5.3)
PROT SERPL-MCNC: 6.5 G/DL — SIGNIFICANT CHANGE UP (ref 6–8.3)
PROTHROM AB SERPL-ACNC: 13.6 SEC — SIGNIFICANT CHANGE UP (ref 10.6–13.6)
RBC # BLD: 4.57 M/UL — SIGNIFICANT CHANGE UP (ref 4.2–5.8)
RBC # BLD: 4.69 M/UL — SIGNIFICANT CHANGE UP (ref 4.2–5.8)
RBC # FLD: 14.6 % — HIGH (ref 10.3–14.5)
RBC # FLD: 14.8 % — HIGH (ref 10.3–14.5)
SODIUM SERPL-SCNC: 146 MMOL/L — HIGH (ref 135–145)
SODIUM SERPL-SCNC: 147 MMOL/L — HIGH (ref 135–145)
WBC # BLD: 9.38 K/UL — SIGNIFICANT CHANGE UP (ref 3.8–10.5)
WBC # BLD: 9.67 K/UL — SIGNIFICANT CHANGE UP (ref 3.8–10.5)
WBC # FLD AUTO: 9.38 K/UL — SIGNIFICANT CHANGE UP (ref 3.8–10.5)
WBC # FLD AUTO: 9.67 K/UL — SIGNIFICANT CHANGE UP (ref 3.8–10.5)

## 2021-06-04 PROCEDURE — 99291 CRITICAL CARE FIRST HOUR: CPT

## 2021-06-04 PROCEDURE — 88309 TISSUE EXAM BY PATHOLOGIST: CPT | Mod: 26

## 2021-06-04 PROCEDURE — 55840 EXTENSIVE PROSTATE SURGERY: CPT | Mod: 22

## 2021-06-04 PROCEDURE — 71045 X-RAY EXAM CHEST 1 VIEW: CPT | Mod: 26

## 2021-06-04 RX ORDER — LEVETIRACETAM 250 MG/1
250 TABLET, FILM COATED ORAL
Refills: 0 | Status: DISCONTINUED | OUTPATIENT
Start: 2021-06-04 | End: 2021-06-05

## 2021-06-04 RX ORDER — CHLORHEXIDINE GLUCONATE 213 G/1000ML
1 SOLUTION TOPICAL DAILY
Refills: 0 | Status: DISCONTINUED | OUTPATIENT
Start: 2021-06-04 | End: 2021-06-12

## 2021-06-04 RX ORDER — POTASSIUM CHLORIDE 20 MEQ
10 PACKET (EA) ORAL
Refills: 0 | Status: COMPLETED | OUTPATIENT
Start: 2021-06-04 | End: 2021-06-04

## 2021-06-04 RX ORDER — HYDRALAZINE HCL 50 MG
10 TABLET ORAL ONCE
Refills: 0 | Status: COMPLETED | OUTPATIENT
Start: 2021-06-04 | End: 2021-06-04

## 2021-06-04 RX ORDER — MAGNESIUM SULFATE 500 MG/ML
2 VIAL (ML) INJECTION ONCE
Refills: 0 | Status: COMPLETED | OUTPATIENT
Start: 2021-06-04 | End: 2021-06-04

## 2021-06-04 RX ORDER — MIDAZOLAM HYDROCHLORIDE 1 MG/ML
0.05 INJECTION, SOLUTION INTRAMUSCULAR; INTRAVENOUS
Qty: 100 | Refills: 0 | Status: DISCONTINUED | OUTPATIENT
Start: 2021-06-04 | End: 2021-06-06

## 2021-06-04 RX ORDER — FENTANYL CITRATE 50 UG/ML
0.5 INJECTION INTRAVENOUS
Qty: 2500 | Refills: 0 | Status: DISCONTINUED | OUTPATIENT
Start: 2021-06-04 | End: 2021-06-09

## 2021-06-04 RX ORDER — CEFAZOLIN SODIUM 1 G
2000 VIAL (EA) INJECTION EVERY 8 HOURS
Refills: 0 | Status: DISCONTINUED | OUTPATIENT
Start: 2021-06-04 | End: 2021-06-06

## 2021-06-04 RX ORDER — SODIUM CHLORIDE 9 MG/ML
1000 INJECTION, SOLUTION INTRAVENOUS
Refills: 0 | Status: DISCONTINUED | OUTPATIENT
Start: 2021-06-04 | End: 2021-06-05

## 2021-06-04 RX ORDER — MIDAZOLAM HYDROCHLORIDE 1 MG/ML
4 INJECTION, SOLUTION INTRAMUSCULAR; INTRAVENOUS ONCE
Refills: 0 | Status: DISCONTINUED | OUTPATIENT
Start: 2021-06-04 | End: 2021-06-04

## 2021-06-04 RX ORDER — CHLORHEXIDINE GLUCONATE 213 G/1000ML
15 SOLUTION TOPICAL EVERY 12 HOURS
Refills: 0 | Status: DISCONTINUED | OUTPATIENT
Start: 2021-06-04 | End: 2021-06-08

## 2021-06-04 RX ORDER — CEFAZOLIN SODIUM 1 G
VIAL (EA) INJECTION
Refills: 0 | Status: DISCONTINUED | OUTPATIENT
Start: 2021-06-04 | End: 2021-06-06

## 2021-06-04 RX ORDER — MIDAZOLAM HYDROCHLORIDE 1 MG/ML
2 INJECTION, SOLUTION INTRAMUSCULAR; INTRAVENOUS ONCE
Refills: 0 | Status: DISCONTINUED | OUTPATIENT
Start: 2021-06-04 | End: 2021-06-04

## 2021-06-04 RX ORDER — MAGNESIUM SULFATE 500 MG/ML
2 VIAL (ML) INJECTION
Refills: 0 | Status: COMPLETED | OUTPATIENT
Start: 2021-06-04 | End: 2021-06-04

## 2021-06-04 RX ORDER — PROPOFOL 10 MG/ML
20 INJECTION, EMULSION INTRAVENOUS
Qty: 1000 | Refills: 0 | Status: DISCONTINUED | OUTPATIENT
Start: 2021-06-04 | End: 2021-06-07

## 2021-06-04 RX ORDER — CEFAZOLIN SODIUM 1 G
2000 VIAL (EA) INJECTION ONCE
Refills: 0 | Status: COMPLETED | OUTPATIENT
Start: 2021-06-04 | End: 2021-06-04

## 2021-06-04 RX ADMIN — Medication 2000 MILLIGRAM(S): at 13:43

## 2021-06-04 RX ADMIN — Medication 100 MILLIEQUIVALENT(S): at 18:54

## 2021-06-04 RX ADMIN — MIDAZOLAM HYDROCHLORIDE 4 MILLIGRAM(S): 1 INJECTION, SOLUTION INTRAMUSCULAR; INTRAVENOUS at 15:26

## 2021-06-04 RX ADMIN — Medication 50 GRAM(S): at 15:08

## 2021-06-04 RX ADMIN — MIDAZOLAM HYDROCHLORIDE 2 MILLIGRAM(S): 1 INJECTION, SOLUTION INTRAMUSCULAR; INTRAVENOUS at 13:41

## 2021-06-04 RX ADMIN — Medication 2000 MILLIGRAM(S): at 21:59

## 2021-06-04 RX ADMIN — FENTANYL CITRATE 0.5 MICROGRAM(S)/KG/HR: 50 INJECTION INTRAVENOUS at 13:49

## 2021-06-04 RX ADMIN — PROPOFOL 6.97 MICROGRAM(S)/KG/MIN: 10 INJECTION, EMULSION INTRAVENOUS at 20:24

## 2021-06-04 RX ADMIN — SODIUM CHLORIDE 100 MILLILITER(S): 9 INJECTION, SOLUTION INTRAVENOUS at 20:24

## 2021-06-04 RX ADMIN — SODIUM CHLORIDE 30 MILLILITER(S): 9 INJECTION, SOLUTION INTRAVENOUS at 13:42

## 2021-06-04 RX ADMIN — MIDAZOLAM HYDROCHLORIDE 2.91 MG/KG/HR: 1 INJECTION, SOLUTION INTRAMUSCULAR; INTRAVENOUS at 16:55

## 2021-06-04 RX ADMIN — FENTANYL CITRATE 2.91 MICROGRAM(S)/KG/HR: 50 INJECTION INTRAVENOUS at 13:42

## 2021-06-04 RX ADMIN — Medication 100 MILLIEQUIVALENT(S): at 21:58

## 2021-06-04 RX ADMIN — CHLORHEXIDINE GLUCONATE 15 MILLILITER(S): 213 SOLUTION TOPICAL at 16:27

## 2021-06-04 RX ADMIN — Medication 100 MILLIEQUIVALENT(S): at 16:28

## 2021-06-04 RX ADMIN — FENTANYL CITRATE 2.91 MICROGRAM(S)/KG/HR: 50 INJECTION INTRAVENOUS at 20:24

## 2021-06-04 RX ADMIN — Medication 50 GRAM(S): at 16:27

## 2021-06-04 RX ADMIN — PROPOFOL 6.97 MICROGRAM(S)/KG/MIN: 10 INJECTION, EMULSION INTRAVENOUS at 13:41

## 2021-06-04 RX ADMIN — LEVETIRACETAM 400 MILLIGRAM(S): 250 TABLET, FILM COATED ORAL at 21:59

## 2021-06-04 RX ADMIN — Medication 50 GRAM(S): at 20:25

## 2021-06-04 RX ADMIN — Medication 10 MILLIGRAM(S): at 15:08

## 2021-06-04 RX ADMIN — MIDAZOLAM HYDROCHLORIDE 2.91 MG/KG/HR: 1 INJECTION, SOLUTION INTRAMUSCULAR; INTRAVENOUS at 20:24

## 2021-06-04 NOTE — PROGRESS NOTE ADULT - ASSESSMENT
41 year old male with Asthma, HTN, seizure disorder, daily ETOH use, and prostate cancer, s/p open radical prostatectomy with EBL of 5L, s/p 12PRBCs, 7FFP, 3 platelets, 14L crystalloid, 1L colloid, transferred to SICU intubated and sedated.    -Strict I&O's  -DVT prophylaxis  -NPO  -Continue SICU management

## 2021-06-04 NOTE — CONSULT NOTE ADULT - SUBJECTIVE AND OBJECTIVE BOX
=====================================================                                                             SICU Consultation Note                                                             =====================================================  Patient is a 41y old  Male who presents with a chief complaint of "I have prostate cancer" (20 May 2021 09:06)    HPI: 41y male  ***    Surgery Information  ***  Case Duration:      EBL:       IV Fluids:       Blood Products:         Complications:        HISTORY  41y Male  Allergies:   PAST MEDICAL & SURGICAL HISTORY:  Seizure    Asthma    Malignant neoplasm of prostate    Hypertension    History of colon surgery    S/P hernia surgery    H/O laceration of skin      FAMILY HISTORY:  FH: epilepsy (Mother)        SOCIAL HISTORY:      ADVANCE DIRECTIVES: Presumed Full Code      REVIEW OF SYSTEMS:    General: Non-Contributory  Skin/Breast: Non-Contributory  Ophthalmologic: Non-Contributory  ENMT: Non-Contributory  Respiratory and Thorax: Non-Contributory  Cardiovascular: Non-Contributory  Gastrointestinal: Non-Contributory  Genitourinary: Non-Contributory  Musculoskeletal: Non-Contributory  Neurological: Non-Contributory  Psychiatric: Non-Contributory  Hematology/Lymphatics: Non-Contributory  Endocrine: Non-Contributory  Allergic/Immunologic: Non-Contributory    HOME MEDICATIONS:     CURRENT MEDICATIONS:   --------------------------------------------------------------------------------------  Neurologic Medications  fentaNYL   Infusion 0.5 MICROgram(s)/kG/Hr IV Continuous <Continuous>  levETIRAcetam  IVPB 250 milliGRAM(s) IV Intermittent <User Schedule>  propofol Infusion 20 MICROgram(s)/kG/Min IV Continuous <Continuous>    Respiratory Medications    Cardiovascular Medications    Gastrointestinal Medications  lactated ringers. 1000 milliLiter(s) IV Continuous <Continuous>    Genitourinary Medications    Hematologic/Oncologic Medications    Antimicrobial/Immunologic Medications  ceFAZolin   IVPB      ceFAZolin   IVPB 2000 milliGRAM(s) IV Intermittent every 8 hours    Endocrine/Metabolic Medications    Topical/Other Medications  chlorhexidine 0.12% Liquid 15 milliLiter(s) Oral Mucosa every 12 hours  chlorhexidine 4% Liquid 1 Application(s) Topical daily    --------------------------------------------------------------------------------------    VITAL SIGNS, INS/OUTS (last 24 hours):  --------------------------------------------------------------------------------------  ((Insert SICU Vitals / Is+Os here)) ***  --------------------------------------------------------------------------------------    EXAM  NEUROLOGY  RASS:   	GCS:    Exam: Normal, NAD, alert, oriented x 3, no focal deficits.     HEENT  Exam: Normocephalic, atraumatic.  EOMI     RESPIRATORY  Exam: Lungs clear to auscultation, Normal expansion/effort.    Mechanical Ventilation:     CARDIOVASCULAR  Exam: S1, S2.  Regular rate and rhythm.  Peripheral edema      GI/NUTRITION  Exam: Abdomen soft, Non-tender, Non-distended.  Gastrostomy / Jejunostomy / Nasogastric tube in place.  Colostomy / Ileostomy.    Wound:    Current Diet:  NPO    VASCULAR  Exam: Extremities warm, pink, well-perfused.     MUSCULOSKELETAL  Exam: All extremities moving spontaneously without limitations.      SKIN:  Exam: Good skin turgor, no skin breakdown.      METABOLIC/FLUIDS/ELECTROLYTES  lactated ringers. 1000 milliLiter(s) IV Continuous <Continuous>      HEMATOLOGIC  [x] DVT Prophylaxis:   Transfusions:	[] PRBC	[] Platelets		[] FFP	[] Cryoprecipitate    INFECTIOUS DISEASE  Antimicrobials/Immunologic Medications:  ceFAZolin   IVPB      ceFAZolin   IVPB 2000 milliGRAM(s) IV Intermittent every 8 hours    Day #  	of    ***    Tubes/Lines/Drains  ***  [x] Peripheral IV  [] Central Venous Line     	[] R	[] L	[] IJ	[] Fem	[] SC	Date Placed:   [] Arterial Line		[] R	[] L	[] Fem	[] Rad	[] Ax	Date Placed:   [] PICC:         	[] Midline		[] Mediport  [] Urinary Catheter		Date Placed:     LABS  --------------------------------------------------------------------------------------  ((Insert SICU Labs here))***  --------------------------------------------------------------------------------------    OTHER LABS    IMAGING RESULTS  Echo:   CT:   Xray:     ASSESSMENT:  41y Male ***    PLAN:  ***  Neurologic:   Respiratory:   Cardiovascular:   Gastrointestinal/Nutrition:   Renal/Genitourinary:   Hematologic:   Infectious Disease:   Tubes/Lines/Drains:   Endocrine:   Disposition:     --------------------------------------------------------------------------------------    Critical Care Diagnoses:                                                              =====================================================                                                             SICU Consultation Note                                                             =====================================================  Patient is a 41y old  Male who presents with a chief complaint of "I have prostate cancer" (20 May 2021 09:06)    HPI: 42 yo male presents to [facility] for preop evaluation for open radical retropubic prostatectomy, pelvic lymph node dissection.  Patient with elevated PSA levels s/p prostate biopsy and diagnosed with malignant neoplasm of prostate.  Patient reports increased urinary frequency, denies dysuria or hematuria.    Surgery Information  Open radical retropubic prostatectomy.  EBL: 6L   IV Fluids: 7L NS and 7L LR    Blood Products:  12 units pRBC, 7 FFP, 3 platelets  Complications: Acute blood loss anemia, hemorrhagic shock.  Reported hemostasis achieved, pt. was closed.    Allergies: NKDA    PAST MEDICAL & SURGICAL HISTORY:  Seizure    Asthma    Malignant neoplasm of prostate    Hypertension    History of colon surgery    S/P hernia surgery    H/O laceration of skin      FAMILY HISTORY:  FH: epilepsy (Mother)      SOCIAL HISTORY:  Daily etoh use.    ADVANCE DIRECTIVES: Presumed Full Code      REVIEW OF SYSTEMS:    General: Non-Contributory  Skin/Breast: Non-Contributory  Ophthalmologic: Non-Contributory  ENMT: Non-Contributory  Respiratory and Thorax: Non-Contributory  Cardiovascular: Non-Contributory  Gastrointestinal: Non-Contributory  Genitourinary: Non-Contributory  Musculoskeletal: Non-Contributory  Neurological: Non-Contributory  Psychiatric: Non-Contributory  Hematology/Lymphatics: Non-Contributory  Endocrine: Non-Contributory  Allergic/Immunologic: Non-Contributory    HOME MEDICATIONS:     CURRENT MEDICATIONS:   --------------------------------------------------------------------------------------  Neurologic Medications  fentaNYL   Infusion 0.5 MICROgram(s)/kG/Hr IV Continuous <Continuous>  levETIRAcetam  IVPB 250 milliGRAM(s) IV Intermittent <User Schedule>  propofol Infusion 20 MICROgram(s)/kG/Min IV Continuous <Continuous>    Respiratory Medications    Cardiovascular Medications    Gastrointestinal Medications  lactated ringers. 1000 milliLiter(s) IV Continuous <Continuous>    Genitourinary Medications    Hematologic/Oncologic Medications    Antimicrobial/Immunologic Medications  ceFAZolin   IVPB      ceFAZolin   IVPB 2000 milliGRAM(s) IV Intermittent every 8 hours    Endocrine/Metabolic Medications    Topical/Other Medications  chlorhexidine 0.12% Liquid 15 milliLiter(s) Oral Mucosa every 12 hours  chlorhexidine 4% Liquid 1 Application(s) Topical daily    --------------------------------------------------------------------------------------    VITAL SIGNS, INS/OUTS (last 24 hours):  --------------------------------------------------------------------------------------  ((Insert SICU Vitals / Is+Os here)) ***  --------------------------------------------------------------------------------------    EXAM  NEUROLOGY  RASS: -3  Exam: Intubated and sedated    HEENT  Exam: Normocephalic, atraumatic.  EOMI    RESPIRATORY  Exam: Lungs clear to auscultation, Normal expansion/effort.  Mechanical Ventilation: 16/500/5/60%    CARDIOVASCULAR  Exam: S1, S2.  Regular rate and rhythm.  Peripheral edema.    GI/NUTRITION  Exam: Abdomen soft, surgical incision site closed w/ bandage over, left sided PANCHO drain.  Current Diet:  NPO    VASCULAR  Exam: Extremities warm, pink, well-perfused.    SKIN:  Exam: Good skin turgor, no skin breakdown.      METABOLIC/FLUIDS/ELECTROLYTES  lactated ringers. 1000 milliLiter(s) IV Continuous <Continuous>      HEMATOLOGIC  [x] DVT Prophylaxis:   Transfusions:	[12] PRBC	[3] Platelets	[7] FFP	[] Cryoprecipitate    INFECTIOUS DISEASE  Antimicrobials/Immunologic Medications:  ceFAZolin   IVPB      ceFAZolin   IVPB 2000 milliGRAM(s) IV Intermittent every 8 hours    Day #  1 of SICU stay    Tubes/Lines/Drains  ***  [x] Peripheral IV  [] Central Venous Line     	[] R	[] L	[] IJ	[] Fem	[] SC	Date Placed:   [] Arterial Line		[] R	[x] L	[] Fem	[x] Rad	[] Ax	Date Placed:   [] PICC:         	[] Midline		[] Mediport  [x] Urinary Catheter		Date Placed: 6/4/2021    LABS  --------------------------------------------------------------------------------------             13.4   9.67  )-----------( 190      ( 04 Jun 2021 13:54 )             39.4       06-04    146<H>  |  106  |  4<L>  ----------------------------<  133<H>  3.4<L>   |  18<L>  |  0.42<L>    Ca    9.9      04 Jun 2021 15:46  Phos  3.8     06-04  Mg     0.6     06-04    TPro  6.5  /  Alb  4.1  /  TBili  2.1<H>  /  DBili  x   /  AST  64<H>  /  ALT  20  /  AlkPhos  71  06-04       LIVER FUNCTIONS - ( 04 Jun 2021 13:54 )  Alb: 4.1 g/dL / Pro: 6.5 g/dL / ALK PHOS: 71 U/L / ALT: 20 U/L / AST: 64 U/L / GGT: x                ABG - ( 04 Jun 2021 13:54 )  pH, Arterial: 7.41  pH, Blood: x     /  pCO2: 30    /  pO2: 195   / HCO3: 21    / Base Excess: -5.0  /  SaO2: 99.5                    PT/INR - ( 04 Jun 2021 13:54 )   PT: 13.6 sec;   INR: 1.20 ratio         PTT - ( 04 Jun 2021 13:54 )  PTT:31.1 sec  --------------------------------------------------------------------------------------  LABS: Personally reviewed labs, imaging, and ECG        RADIOLOGY & ADDITIONAL TESTS:   OTHER LABS    IMAGING RESULTS  Echo:   CT:   Xray:

## 2021-06-04 NOTE — PROGRESS NOTE ADULT - SUBJECTIVE AND OBJECTIVE BOX
Note    Post op Check    s/p: open radical prostatectomy  EBL 5L, s/p 12PRBCs, 7FFP, 3 platelets, 14L crystalloid, 1L colloid  Transferred to SICU intubated and sedated    Pt seen and examined, intubated and sedated.     Vital Signs Last 24 Hrs  T(C): 36.1 (04 Jun 2021 16:00), Max: 36.9 (04 Jun 2021 06:21)  T(F): 97 (04 Jun 2021 16:00), Max: 98.4 (04 Jun 2021 06:21)  HR: 70 (04 Jun 2021 22:00) (68 - 108)  BP: 168/99 (04 Jun 2021 14:00) (108/88 - 168/99)  BP(mean): 119 (04 Jun 2021 14:00) (119 - 119)  RR: 16 (04 Jun 2021 22:00) (16 - 16)  SpO2: 100% (04 Jun 2021 22:00) (95% - 100%)    I&O's Summary    04 Jun 2021 07:01  -  04 Jun 2021 22:52  --------------------------------------------------------  IN: 1962.3 mL / OUT: 9405 mL / NET: -7442.7 mL    PHYSICAL EXAM:   Constitutional: intubated, sedated, no distress    Respiratory: no distress    Cardiovascular: regular    Gastrointestinal: softly distended, dressing clean and dry.      Genitourinary: PANCHO in place, draining well, serosanguinous, ~830ml output since surgery. White draining well, clear yellow urine, 8,575ml output since surgery.     Extremities: venodynes in place.    LABS:                        13.4   9.67  )-----------( 190      ( 04 Jun 2021 13:54 )             39.4       06-04    146<H>  |  106  |  4<L>  ----------------------------<  133<H>  3.4<L>   |  18<L>  |  0.42<L>    Ca    9.9      04 Jun 2021 15:46  Phos  3.8     06-04  Mg     0.8     06-04    TPro  6.5  /  Alb  4.1  /  TBili  2.1<H>  /  DBili  x   /  AST  64<H>  /  ALT  20  /  AlkPhos  71  06-04

## 2021-06-04 NOTE — CONSULT NOTE ADULT - ASSESSMENT
ASSESSMENT:  41y Male w/ recently diagnosed prostate cancer s/p radical prostatectomy.  Operative course c/b 5 units blood loss, s/p 12 units pRBC, 7 units FFP, 3 units platelets, 14L crystalloids, and 1L albumin, admitted to the SICU intubated and for hemorrhage watch.    #Neurologic  -Intubated and sedated; Volume A/C: 16/500/5/60%  -Sedated on propofol and fentanyl gtt  -Keppra 250mg IVPB TID for hx of seizure disorder.     Respiratory:   Cardiovascular:   Gastrointestinal/Nutrition:   Renal/Genitourinary:   Hematologic:   Infectious Disease:   Tubes/Lines/Drains:   Endocrine:   Disposition:     --------------------------------------------------------------------------------------    Critical Care Diagnoses:     ASSESSMENT:  41y Male w/ recently diagnosed prostate cancer s/p radical prostatectomy.  Operative course c/b 5 units blood loss, s/p 12 units pRBC, 7 units FFP, 3 units platelets, 14L crystalloids, and 1L albumin, admitted to the SICU intubated and for hemorrhage watch.    #Neurologic  -Intubated and sedated; Volume A/C: 16/500/5/60%  -Sedated on propofol and fentanyl gtt  -Keppra 250mg IVPB TID for hx of seizure disorder  -On versed gtt for agitation 2/2 daily alcohol use     Respiratory:  -Intubated Volume A/C: 16/500/5/60%  -Monitor ABGs    Cardiovascular  -Hypertensive on metoprolol at home  -Holding anti-hypertensives for now    Gastrointestinal/Nutrition:  -NPO    #Renal/Genitourinary  -S/p radical prostatectomy  -Voided 7925mL urine  -Hypokalemic s/p repletion  -Left PANCHO to pleuravac suction 710cc  -Follow I/Os  -Trend CR  -Maintenance fluids LR at 100cc/hr    Hematologic: no acute issues  -Trend CBC  -Holding chemical VTE ppx    Infectious Disease  -Ancef 2g q 8 hours (started on 6/4/2021)  -Trend WBC count, fever curve    Tubes/Lines/Drains:  -L radial a-line  -PIVs  -White catheter  -L PANCHO to pleuravac suction    Endocrine: No acute issues  -Monitor serum glucose on BMP    Disposition: SICU    --------------------------------------------------------------------------------------    Critical Care Diagnoses:

## 2021-06-04 NOTE — PATIENT PROFILE ADULT - NSPROSPHOSPCHAPLAINYN_GEN_A_NUR
Unable to assess.  Patient is intubated and sedated. Unable to assess.  Patient is intubated and sedated./no

## 2021-06-05 LAB
ANION GAP SERPL CALC-SCNC: 11 MMOL/L — SIGNIFICANT CHANGE UP (ref 7–14)
ANION GAP SERPL CALC-SCNC: 12 MMOL/L — SIGNIFICANT CHANGE UP (ref 7–14)
ANION GAP SERPL CALC-SCNC: 13 MMOL/L — SIGNIFICANT CHANGE UP (ref 7–14)
ANION GAP SERPL CALC-SCNC: 15 MMOL/L — HIGH (ref 7–14)
BLOOD GAS ARTERIAL - LYTES,HGB,ICA,LACT RESULT: SIGNIFICANT CHANGE UP
BLOOD GAS ARTERIAL - LYTES,HGB,ICA,LACT RESULT: SIGNIFICANT CHANGE UP
BUN SERPL-MCNC: 5 MG/DL — LOW (ref 7–23)
BUN SERPL-MCNC: 6 MG/DL — LOW (ref 7–23)
CALCIUM SERPL-MCNC: 8.1 MG/DL — LOW (ref 8.4–10.5)
CALCIUM SERPL-MCNC: 8.6 MG/DL — SIGNIFICANT CHANGE UP (ref 8.4–10.5)
CALCIUM SERPL-MCNC: 9.1 MG/DL — SIGNIFICANT CHANGE UP (ref 8.4–10.5)
CALCIUM SERPL-MCNC: 9.4 MG/DL — SIGNIFICANT CHANGE UP (ref 8.4–10.5)
CHLORIDE SERPL-SCNC: 104 MMOL/L — SIGNIFICANT CHANGE UP (ref 98–107)
CHLORIDE SERPL-SCNC: 104 MMOL/L — SIGNIFICANT CHANGE UP (ref 98–107)
CHLORIDE SERPL-SCNC: 105 MMOL/L — SIGNIFICANT CHANGE UP (ref 98–107)
CHLORIDE SERPL-SCNC: 107 MMOL/L — SIGNIFICANT CHANGE UP (ref 98–107)
CO2 SERPL-SCNC: 22 MMOL/L — SIGNIFICANT CHANGE UP (ref 22–31)
CO2 SERPL-SCNC: 23 MMOL/L — SIGNIFICANT CHANGE UP (ref 22–31)
CO2 SERPL-SCNC: 24 MMOL/L — SIGNIFICANT CHANGE UP (ref 22–31)
CO2 SERPL-SCNC: 24 MMOL/L — SIGNIFICANT CHANGE UP (ref 22–31)
CREAT SERPL-MCNC: 0.5 MG/DL — SIGNIFICANT CHANGE UP (ref 0.5–1.3)
CREAT SERPL-MCNC: 0.53 MG/DL — SIGNIFICANT CHANGE UP (ref 0.5–1.3)
CREAT SERPL-MCNC: 0.53 MG/DL — SIGNIFICANT CHANGE UP (ref 0.5–1.3)
CREAT SERPL-MCNC: 0.57 MG/DL — SIGNIFICANT CHANGE UP (ref 0.5–1.3)
GLUCOSE SERPL-MCNC: 102 MG/DL — HIGH (ref 70–99)
GLUCOSE SERPL-MCNC: 109 MG/DL — HIGH (ref 70–99)
GLUCOSE SERPL-MCNC: 111 MG/DL — HIGH (ref 70–99)
GLUCOSE SERPL-MCNC: 99 MG/DL — SIGNIFICANT CHANGE UP (ref 70–99)
HCT VFR BLD CALC: 36.5 % — LOW (ref 39–50)
HCT VFR BLD CALC: 38.2 % — LOW (ref 39–50)
HGB BLD-MCNC: 12.8 G/DL — LOW (ref 13–17)
HGB BLD-MCNC: 12.8 G/DL — LOW (ref 13–17)
MAGNESIUM SERPL-MCNC: 1.3 MG/DL — LOW (ref 1.6–2.6)
MAGNESIUM SERPL-MCNC: 1.5 MG/DL — LOW (ref 1.6–2.6)
MAGNESIUM SERPL-MCNC: 1.6 MG/DL — SIGNIFICANT CHANGE UP (ref 1.6–2.6)
MAGNESIUM SERPL-MCNC: 1.8 MG/DL — SIGNIFICANT CHANGE UP (ref 1.6–2.6)
MCHC RBC-ENTMCNC: 28.2 PG — SIGNIFICANT CHANGE UP (ref 27–34)
MCHC RBC-ENTMCNC: 28.5 PG — SIGNIFICANT CHANGE UP (ref 27–34)
MCHC RBC-ENTMCNC: 33.5 GM/DL — SIGNIFICANT CHANGE UP (ref 32–36)
MCHC RBC-ENTMCNC: 35.1 GM/DL — SIGNIFICANT CHANGE UP (ref 32–36)
MCV RBC AUTO: 81.3 FL — SIGNIFICANT CHANGE UP (ref 80–100)
MCV RBC AUTO: 84.1 FL — SIGNIFICANT CHANGE UP (ref 80–100)
NRBC # BLD: 0 /100 WBCS — SIGNIFICANT CHANGE UP
NRBC # BLD: 0 /100 WBCS — SIGNIFICANT CHANGE UP
NRBC # FLD: 0 K/UL — SIGNIFICANT CHANGE UP
NRBC # FLD: 0.02 K/UL — HIGH
PHOSPHATE SERPL-MCNC: 3.5 MG/DL — SIGNIFICANT CHANGE UP (ref 2.5–4.5)
PHOSPHATE SERPL-MCNC: 3.6 MG/DL — SIGNIFICANT CHANGE UP (ref 2.5–4.5)
PHOSPHATE SERPL-MCNC: 4 MG/DL — SIGNIFICANT CHANGE UP (ref 2.5–4.5)
PHOSPHATE SERPL-MCNC: 4.1 MG/DL — SIGNIFICANT CHANGE UP (ref 2.5–4.5)
PLATELET # BLD AUTO: 155 K/UL — SIGNIFICANT CHANGE UP (ref 150–400)
PLATELET # BLD AUTO: 157 K/UL — SIGNIFICANT CHANGE UP (ref 150–400)
POTASSIUM SERPL-MCNC: 3.7 MMOL/L — SIGNIFICANT CHANGE UP (ref 3.5–5.3)
POTASSIUM SERPL-MCNC: 3.8 MMOL/L — SIGNIFICANT CHANGE UP (ref 3.5–5.3)
POTASSIUM SERPL-MCNC: 3.9 MMOL/L — SIGNIFICANT CHANGE UP (ref 3.5–5.3)
POTASSIUM SERPL-MCNC: 4.1 MMOL/L — SIGNIFICANT CHANGE UP (ref 3.5–5.3)
POTASSIUM SERPL-SCNC: 3.7 MMOL/L — SIGNIFICANT CHANGE UP (ref 3.5–5.3)
POTASSIUM SERPL-SCNC: 3.8 MMOL/L — SIGNIFICANT CHANGE UP (ref 3.5–5.3)
POTASSIUM SERPL-SCNC: 3.9 MMOL/L — SIGNIFICANT CHANGE UP (ref 3.5–5.3)
POTASSIUM SERPL-SCNC: 4.1 MMOL/L — SIGNIFICANT CHANGE UP (ref 3.5–5.3)
RBC # BLD: 4.49 M/UL — SIGNIFICANT CHANGE UP (ref 4.2–5.8)
RBC # BLD: 4.54 M/UL — SIGNIFICANT CHANGE UP (ref 4.2–5.8)
RBC # FLD: 15.3 % — HIGH (ref 10.3–14.5)
RBC # FLD: 15.9 % — HIGH (ref 10.3–14.5)
SODIUM SERPL-SCNC: 139 MMOL/L — SIGNIFICANT CHANGE UP (ref 135–145)
SODIUM SERPL-SCNC: 140 MMOL/L — SIGNIFICANT CHANGE UP (ref 135–145)
SODIUM SERPL-SCNC: 141 MMOL/L — SIGNIFICANT CHANGE UP (ref 135–145)
SODIUM SERPL-SCNC: 144 MMOL/L — SIGNIFICANT CHANGE UP (ref 135–145)
WBC # BLD: 10.07 K/UL — SIGNIFICANT CHANGE UP (ref 3.8–10.5)
WBC # BLD: 9.07 K/UL — SIGNIFICANT CHANGE UP (ref 3.8–10.5)
WBC # FLD AUTO: 10.07 K/UL — SIGNIFICANT CHANGE UP (ref 3.8–10.5)
WBC # FLD AUTO: 9.07 K/UL — SIGNIFICANT CHANGE UP (ref 3.8–10.5)

## 2021-06-05 PROCEDURE — 99291 CRITICAL CARE FIRST HOUR: CPT

## 2021-06-05 PROCEDURE — 70496 CT ANGIOGRAPHY HEAD: CPT | Mod: 26

## 2021-06-05 PROCEDURE — 71045 X-RAY EXAM CHEST 1 VIEW: CPT | Mod: 26

## 2021-06-05 PROCEDURE — 71045 X-RAY EXAM CHEST 1 VIEW: CPT | Mod: 26,77

## 2021-06-05 PROCEDURE — 93010 ELECTROCARDIOGRAM REPORT: CPT

## 2021-06-05 PROCEDURE — 70498 CT ANGIOGRAPHY NECK: CPT | Mod: 26

## 2021-06-05 RX ORDER — POTASSIUM CHLORIDE 20 MEQ
10 PACKET (EA) ORAL
Refills: 0 | Status: COMPLETED | OUTPATIENT
Start: 2021-06-05 | End: 2021-06-05

## 2021-06-05 RX ORDER — MAGNESIUM SULFATE 500 MG/ML
2 VIAL (ML) INJECTION ONCE
Refills: 0 | Status: COMPLETED | OUTPATIENT
Start: 2021-06-05 | End: 2021-06-05

## 2021-06-05 RX ORDER — PANTOPRAZOLE SODIUM 20 MG/1
40 TABLET, DELAYED RELEASE ORAL DAILY
Refills: 0 | Status: DISCONTINUED | OUTPATIENT
Start: 2021-06-05 | End: 2021-06-11

## 2021-06-05 RX ORDER — LEVETIRACETAM 250 MG/1
500 TABLET, FILM COATED ORAL EVERY 12 HOURS
Refills: 0 | Status: DISCONTINUED | OUTPATIENT
Start: 2021-06-05 | End: 2021-06-06

## 2021-06-05 RX ORDER — LEVETIRACETAM 250 MG/1
500 TABLET, FILM COATED ORAL ONCE
Refills: 0 | Status: DISCONTINUED | OUTPATIENT
Start: 2021-06-05 | End: 2021-06-05

## 2021-06-05 RX ORDER — SODIUM CHLORIDE 9 MG/ML
1000 INJECTION, SOLUTION INTRAVENOUS
Refills: 0 | Status: DISCONTINUED | OUTPATIENT
Start: 2021-06-05 | End: 2021-06-08

## 2021-06-05 RX ORDER — LEVETIRACETAM 250 MG/1
500 TABLET, FILM COATED ORAL ONCE
Refills: 0 | Status: COMPLETED | OUTPATIENT
Start: 2021-06-05 | End: 2021-06-05

## 2021-06-05 RX ORDER — ENOXAPARIN SODIUM 100 MG/ML
40 INJECTION SUBCUTANEOUS DAILY
Refills: 0 | Status: DISCONTINUED | OUTPATIENT
Start: 2021-06-05 | End: 2021-06-12

## 2021-06-05 RX ORDER — MAGNESIUM SULFATE 500 MG/ML
1 VIAL (ML) INJECTION ONCE
Refills: 0 | Status: COMPLETED | OUTPATIENT
Start: 2021-06-05 | End: 2021-06-05

## 2021-06-05 RX ORDER — POTASSIUM CHLORIDE 20 MEQ
20 PACKET (EA) ORAL
Refills: 0 | Status: DISCONTINUED | OUTPATIENT
Start: 2021-06-05 | End: 2021-06-05

## 2021-06-05 RX ADMIN — Medication 50 GRAM(S): at 10:51

## 2021-06-05 RX ADMIN — Medication 2000 MILLIGRAM(S): at 05:12

## 2021-06-05 RX ADMIN — Medication 100 GRAM(S): at 05:32

## 2021-06-05 RX ADMIN — CHLORHEXIDINE GLUCONATE 1 APPLICATION(S): 213 SOLUTION TOPICAL at 12:45

## 2021-06-05 RX ADMIN — Medication 100 MILLIEQUIVALENT(S): at 12:43

## 2021-06-05 RX ADMIN — SODIUM CHLORIDE 100 MILLILITER(S): 9 INJECTION, SOLUTION INTRAVENOUS at 08:39

## 2021-06-05 RX ADMIN — LEVETIRACETAM 400 MILLIGRAM(S): 250 TABLET, FILM COATED ORAL at 17:32

## 2021-06-05 RX ADMIN — Medication 50 GRAM(S): at 06:35

## 2021-06-05 RX ADMIN — LEVETIRACETAM 400 MILLIGRAM(S): 250 TABLET, FILM COATED ORAL at 05:32

## 2021-06-05 RX ADMIN — Medication 2000 MILLIGRAM(S): at 22:11

## 2021-06-05 RX ADMIN — FENTANYL CITRATE 2.91 MICROGRAM(S)/KG/HR: 50 INJECTION INTRAVENOUS at 08:39

## 2021-06-05 RX ADMIN — ENOXAPARIN SODIUM 40 MILLIGRAM(S): 100 INJECTION SUBCUTANEOUS at 12:43

## 2021-06-05 RX ADMIN — PANTOPRAZOLE SODIUM 40 MILLIGRAM(S): 20 TABLET, DELAYED RELEASE ORAL at 12:44

## 2021-06-05 RX ADMIN — LEVETIRACETAM 400 MILLIGRAM(S): 250 TABLET, FILM COATED ORAL at 22:09

## 2021-06-05 RX ADMIN — Medication 2 MILLIGRAM(S): at 12:13

## 2021-06-05 RX ADMIN — Medication 2000 MILLIGRAM(S): at 13:35

## 2021-06-05 RX ADMIN — MIDAZOLAM HYDROCHLORIDE 2.91 MG/KG/HR: 1 INJECTION, SOLUTION INTRAMUSCULAR; INTRAVENOUS at 22:10

## 2021-06-05 RX ADMIN — Medication 2 MILLIGRAM(S): at 17:24

## 2021-06-05 RX ADMIN — PROPOFOL 6.97 MICROGRAM(S)/KG/MIN: 10 INJECTION, EMULSION INTRAVENOUS at 22:10

## 2021-06-05 RX ADMIN — Medication 100 MILLIEQUIVALENT(S): at 10:51

## 2021-06-05 RX ADMIN — LEVETIRACETAM 400 MILLIGRAM(S): 250 TABLET, FILM COATED ORAL at 13:36

## 2021-06-05 RX ADMIN — CHLORHEXIDINE GLUCONATE 15 MILLILITER(S): 213 SOLUTION TOPICAL at 05:12

## 2021-06-05 RX ADMIN — MIDAZOLAM HYDROCHLORIDE 2.91 MG/KG/HR: 1 INJECTION, SOLUTION INTRAMUSCULAR; INTRAVENOUS at 04:42

## 2021-06-05 RX ADMIN — Medication 50 GRAM(S): at 18:20

## 2021-06-05 RX ADMIN — FENTANYL CITRATE 2.91 MICROGRAM(S)/KG/HR: 50 INJECTION INTRAVENOUS at 22:10

## 2021-06-05 RX ADMIN — SODIUM CHLORIDE 75 MILLILITER(S): 9 INJECTION, SOLUTION INTRAVENOUS at 12:00

## 2021-06-05 RX ADMIN — PROPOFOL 6.97 MICROGRAM(S)/KG/MIN: 10 INJECTION, EMULSION INTRAVENOUS at 08:39

## 2021-06-05 RX ADMIN — CHLORHEXIDINE GLUCONATE 15 MILLILITER(S): 213 SOLUTION TOPICAL at 17:32

## 2021-06-05 RX ADMIN — Medication 2 MILLIGRAM(S): at 17:18

## 2021-06-05 RX ADMIN — MIDAZOLAM HYDROCHLORIDE 2.91 MG/KG/HR: 1 INJECTION, SOLUTION INTRAMUSCULAR; INTRAVENOUS at 08:40

## 2021-06-05 NOTE — CONSULT NOTE ADULT - ASSESSMENT
Patient is a 42 yo male with recently diagnosed prostate cancer admitted for elective prostatectomy Neurology consulted for concern of seizures.   Operative course c/b 5 units blood loss, s/p 12 units pRBC, 7 units FFP, 3 units platelets, 14L crystalloids, and 1L albumin, admitted to the SICU intubated and for hemorrhage watch. Per primary team patient was taken off propofol and they attempted to wean versed when patient had episode of b/l UE shaking followed by whole body shaking. Patient was given ativan 2mg after 2 min which resolved the episode. Patient had another similar episode which also resolved with ativan. Patient loaded with Keppra 500mg x1 and started on 500mg BID. Patient reportedly on Keppra 250mg TID at home. This is an odd dose and unclear the indication. No prior note in [employer]scripts documents hx of epilepsy however patient home medication of keppra noted. Unable to reach family over the phone for more collateral. Patient with hyperreflexia of RUE, LUE and LLE. RLE with depressed reflexes comparatively. + pec reflex b/l    Impressions   Possibly seizures, breakthrough in the setting of multiple metabolic abnormalities and hemorrhagic shock vs 2/2 new intracranial insult due to hemorrhagic shock. unclear etiology of hyperreflexia and asymmetric reflexes    Recommendations  - vEEG  - Load another Keppra 500mg x 1, initial load not enough given patient weight   - CTH, CTA H/N,  - MRI brain without contrast, MR c-spine  - Continue sedation at this time. Wait until patient can be evaluated on EEG to wean sedation   - Rest of care per primary team     Case to be discussed with neurology attending Dr. Lemus

## 2021-06-05 NOTE — PROGRESS NOTE ADULT - SUBJECTIVE AND OBJECTIVE BOX
Interval events:   Electrolytes repleated. 1 u cryo given.     SUBJECTIVE:  Intubated and sedated       OBJECTIVE:  Vital Signs Last 24 Hrs  T(C): 36.6 (05 Jun 2021 08:00), Max: 36.7 (04 Jun 2021 20:00)  T(F): 97.9 (05 Jun 2021 08:00), Max: 98.1 (04 Jun 2021 20:00)  HR: 72 (05 Jun 2021 08:00) (58 - 108)  BP: 123/92 (05 Jun 2021 08:00) (123/92 - 168/99)  BP(mean): 103 (05 Jun 2021 08:00) (103 - 119)  RR: 16 (05 Jun 2021 08:00) (15 - 16)  SpO2: 98% (05 Jun 2021 08:00) (97% - 100%)    Physical Examination:  GEN: sedated  PULM: intubated AC/MV  ABD: soft, retention sutures in place, PANCHO sanguinous   : lee off traction       LABS:                        13.0   9.38  )-----------( 189      ( 04 Jun 2021 23:41 )             36.7       06-05    141  |  105  |  6<L>  ----------------------------<  111<H>  3.7   |  23  |  0.53    Ca    9.1      05 Jun 2021 05:31  Phos  4.0     06-05  Mg     1.3     06-05    TPro  6.5  /  Alb  4.1  /  TBili  2.1<H>  /  DBili  x   /  AST  64<H>  /  ALT  20  /  AlkPhos  71  06-04

## 2021-06-05 NOTE — PROGRESS NOTE ADULT - ASSESSMENT
41y Male w/ recently diagnosed prostate cancer s/p radical prostatectomy.  Operative course c/b 5 units blood loss, s/p 12 units pRBC, 7 units FFP, 3 units platelets, 14L crystalloids, and 1L albumin, admitted to the SICU intubated and for hemorrhage watch.    - Intubated and sedated, wean sedation/SBT per SICU  - NPO while intubated   - replete electrolytes prn   - Trend Cr and I/Os  - Trend H/H, stable last night 13/36  - Discontinue ancef   - Continue lee catheter

## 2021-06-05 NOTE — CONSULT NOTE ADULT - ATTENDING COMMENTS
40 yo male with recently diagnosed prostate cancer admitted for elective prostatectomy, Hz of seizures in [place] documented as far back as 2010. Operative course c/b 5 units blood loss. : Seizure actity when attempting to wean off sedation. PE limited by sedation. Follow up read CTH CTA H/N    Would obtain EEG and attempt to wean off sedation tomorrow  Consider increasing Keppra to 750 BID  MR brain when stable
I have personally examined this patient, reviewed pertinent labs and imaging, and discussed the case with colleagues, residents, physician assistants, and nurses on SICU rounds.    45  minutes in total were spent in providing direct critical care for the diagnoses, assessment and plan outlined below.  These diagnoses are unrelated to the surgical procedure.  Additionally, time spent in the performance of separately billable procedures was not counted toward the critical care time.  There is no overlap.  Time included review of vitals, labs, imaging, discussion with consultants and coordination with the operating room team.    The active critical care issues are:  1. vent insufficiency following surgery, need for MV support while deeply sedated  2. delirium risk 2/2 EtOH dependence, switch to midaz infusion  3. acute blood loss anemia and coagulopathy, replete factors as needed  4. electrolyte disturbance -replete potassium judiciously

## 2021-06-05 NOTE — CONSULT NOTE ADULT - SUBJECTIVE AND OBJECTIVE BOX
HPI:   Patient is a 42 yo male with recently diagnosed prostate cancer admitted for elective prostatectomy Neurology consulted for concern of seizures.  Collateral per chart review as follows: Patient presented to [facility] for preop evaluation for open radical retropubic prostatectomy, pelvic lymph node dissection.  Patient with elevated PSA levels s/p prostate biopsy and diagnosed with malignant neoplasm of prostate.  Patient reports increased urinary frequency, denies dysuria or hematuria.     Surgery Information  Open radical retropubic prostatectomy.  EBL: 6L   IV Fluids: 7L NS and 7L LR    Blood Products:  12 units pRBC, 7 FFP, 3 platelets  Complications: Acute blood loss anemia, hemorrhagic shock.  Reported hemostasis achieved, pt. was closed.    Unable to obtain collateral from family over the phone for collateral    Per primary team patient was taken off propofol and they attempted to wean versed when patient had episode of b/l UE shaking followed by whole body shaking. Patient was given ativan 2mg after 2 min which resolved the episode. Patient had another similar episode which also resolved with ativan. Patient loaded with Keppra 500mg x1 and started on 500mg BID        ROS: A 10-system ROS was performed and is negative except for those items noted above and/or in the HPI.    PAST MEDICAL & SURGICAL HISTORY:  Seizure    Asthma    Malignant neoplasm of prostate    Hypertension    History of colon surgery    S/P hernia surgery    H/O laceration of skin      FAMILY HISTORY:  FH: epilepsy (Mother)        SOCIAL HISTORY: SOCIAL HISTORY:     Marital Status: (  )   (  ) Single  (  )   (  )      Occupation:      Lives: (  ) alone  (  ) with children   (  ) with spouse  (  ) with parents  (  ) other     Illicit Drug Use: (  ) never used  (  ) other _____     Tobacco Use:  (  ) never smoked  (  ) former smoker  (  ) current smoker  (  ) pack year  (  ) last cigarette date     Alcohol Use:      Sexual History:        MEDICATIONS  Home Medications:  folic acid 1 mg oral tablet: 1 tab(s) orally once a day (04 Jun 2021 06:34)  Keppra 250 mg oral tablet: 1 tab(s) orally 3 times a day (04 Jun 2021 06:34)  metoprolol tartrate 100 mg oral tablet: 1 tab(s) orally once a day (04 Jun 2021 06:34)  Ventolin HFA CFC free 90 mcg/inh inhalation aerosol: 2 puff(s) inhaled 4 times a day, As Needed (04 Jun 2021 06:34)      MEDICATIONS  (STANDING):  ceFAZolin   IVPB      ceFAZolin   IVPB 2000 milliGRAM(s) IV Intermittent every 8 hours  chlorhexidine 0.12% Liquid 15 milliLiter(s) Oral Mucosa every 12 hours  chlorhexidine 4% Liquid 1 Application(s) Topical daily  dextrose 5% + sodium chloride 0.45% 1000 milliLiter(s) (75 mL/Hr) IV Continuous <Continuous>  enoxaparin Injectable 40 milliGRAM(s) SubCutaneous daily  fentaNYL   Infusion 0.5 MICROgram(s)/kG/Hr (2.91 mL/Hr) IV Continuous <Continuous>  levETIRAcetam 500 milliGRAM(s) Oral once  levETIRAcetam  IVPB 500 milliGRAM(s) IV Intermittent every 12 hours  midazolam Infusion 0.05 mG/kG/Hr (2.91 mL/Hr) IV Continuous <Continuous>  pantoprazole  Injectable 40 milliGRAM(s) IV Push daily  propofol Infusion 20 MICROgram(s)/kG/Min (6.97 mL/Hr) IV Continuous <Continuous>    MEDICATIONS  (PRN):      ALLERGIES/INTOLERANCES:  Allergies  No Known Allergies    Intolerances      OBJECTIVE:  VITALS   Vital Signs Last 24 Hrs  T(C): 36.9 (05 Jun 2021 16:00), Max: 36.9 (05 Jun 2021 16:00)  T(F): 98.5 (05 Jun 2021 16:00), Max: 98.5 (05 Jun 2021 16:00)  HR: 110 (05 Jun 2021 18:00) (58 - 174)  BP: 102/71 (05 Jun 2021 18:00) (96/74 - 162/94)  BP(mean): 82 (05 Jun 2021 18:00) (82 - 113)  RR: 14 (05 Jun 2021 18:00) (14 - 20)  SpO2: 94% (05 Jun 2021 18:00) (90% - 100%)    PHYSICAL EXAM:  Neurological Exam:  Mental Status: Intubated and sedated. Does not open eyes, no response to sternal rub.   Cranial Nerves: Pupils equal and reactive. No movement on oculocephalic maneuver. + corneal reflex b/l . Unable to asses facial symmetry as patient intubated   Motor: Trace movement of distal RUE, RLE and LLE to painful stimuli. No movement of LUE  Tremor: No resting tremor.  No myoclonus.  Sensation: Trace movement to painful stimuli x3   Deep Tendon Reflexes: 3+ bilateral biceps, triceps, brachioradialis b/l. 3+ knee and 2+ankle on L, 1+ knee and mute ankle on R. + pectoralis reflex b/l. No hoffmans sign  Toes mute bilaterally  Gait: Unable to assess    LABORATORY:  CBC                       12.8   10.07 )-----------( 157      ( 05 Jun 2021 16:44 )             38.2     Chem 06-05    139  |  104  |  6<L>  ----------------------------<  102<H>  4.1   |  24  |  0.57    Ca    8.1<L>      05 Jun 2021 16:44  Phos  3.5     06-05  Mg     1.6     06-05    TPro  6.5  /  Alb  4.1  /  TBili  2.1<H>  /  DBili  x   /  AST  64<H>  /  ALT  20  /  AlkPhos  71  06-04    LFTs LIVER FUNCTIONS - ( 04 Jun 2021 13:54 )  Alb: 4.1 g/dL / Pro: 6.5 g/dL / ALK PHOS: 71 U/L / ALT: 20 U/L / AST: 64 U/L / GGT: x           Coagulopathy PT/INR - ( 04 Jun 2021 13:54 )   PT: 13.6 sec;   INR: 1.20 ratio         PTT - ( 04 Jun 2021 13:54 )  PTT:31.1 sec  Lipid Panel   A1c   Cardiac enzymes     U/A   CSF  Immunological  Other    STUDIES & IMAGING:  Studies (EKG, EEG, EMG, etc):     Radiology (XR, CT, MR, U/S, TTE/SHARONA):

## 2021-06-05 NOTE — PROGRESS NOTE ADULT - SUBJECTIVE AND OBJECTIVE BOX
SICU Daily Progress Note  ====================================================    Interval/Overnight Events:    Potassium and Mag repleted  Versed, prop, fent drips started  1U Cryo given  OR for prostatectomy c/b hemorrhagic shock s/p multiple blood products    HPI:  42 yo male presents to [facility] for preop evaluation for open radical retropubic prostatectomy, pelvic lymph node dissection.  Patient with elevated PSA levels s/p prostate biopsy and diagnosed with malignant neoplasm of prostate.  Patient reports increased urinary frequency, denies dysuria or hematuria.    Surgery Information  Open radical retropubic prostatectomy.  EBL: 6L   IV Fluids: 7L NS and 7L LR    Blood Products:  12 units pRBC, 7 FFP, 3 platelets  Complications: Acute blood loss anemia, hemorrhagic shock.  Reported hemostasis achieved, pt. was closed. 1U Cryo given in SICU and he was started on Prop, Fent, and Versed drips.      Allergies: No Known Allergies      MEDICATIONS:   --------------------------------------------------------------------------------------  Neurologic Medications  fentaNYL   Infusion 0.5 MICROgram(s)/kG/Hr IV Continuous <Continuous>  levETIRAcetam  IVPB 250 milliGRAM(s) IV Intermittent <User Schedule>  midazolam Infusion 0.05 mG/kG/Hr IV Continuous <Continuous>  propofol Infusion 20 MICROgram(s)/kG/Min IV Continuous <Continuous>    Respiratory Medications    Cardiovascular Medications    Gastrointestinal Medications  lactated ringers. 1000 milliLiter(s) IV Continuous <Continuous>    Genitourinary Medications    Hematologic/Oncologic Medications    Antimicrobial/Immunologic Medications  ceFAZolin   IVPB      ceFAZolin   IVPB 2000 milliGRAM(s) IV Intermittent every 8 hours    Endocrine/Metabolic Medications    Topical/Other Medications  chlorhexidine 0.12% Liquid 15 milliLiter(s) Oral Mucosa every 12 hours  chlorhexidine 4% Liquid 1 Application(s) Topical daily    --------------------------------------------------------------------------------------    VITAL SIGNS, INS/OUTS (last 24 hours):  --------------------------------------------------------------------------------------  ICU Vital Signs Last 24 Hrs  T(C): 36.2 (05 Jun 2021 00:00), Max: 36.9 (04 Jun 2021 06:21)  T(F): 97.1 (05 Jun 2021 00:00), Max: 98.4 (04 Jun 2021 06:21)  HR: 60 (05 Jun 2021 00:00) (60 - 108)  BP: 168/99 (04 Jun 2021 14:00) (108/88 - 168/99)  BP(mean): 119 (04 Jun 2021 14:00) (119 - 119)  ABP: 153/74 (05 Jun 2021 00:00) (123/65 - 185/95)  ABP(mean): 105 (05 Jun 2021 00:00) (73 - 132)  RR: 16 (05 Jun 2021 00:00) (16 - 16)  SpO2: 99% (05 Jun 2021 00:00) (95% - 100%)  --------------------------------------------------------------------------------------    EXAM  NEUROLOGY  Exam: Intubated and sedated    HEENT  Exam: Normocephalic, atraumatic    RESPIRATORY  Exam: Intubated  Mechanical Ventilation: Mode: AC/ CMV (Assist Control/ Continuous Mandatory Ventilation), RR (machine): 16, TV (machine): 500, FiO2: 30, PEEP: 5, ITime: 0.9, MAP: 10, PIP: 23    CARDIOVASCULAR  Exam: Regular rate and rhythm    GI/NUTRITION  Exam: Abdomen soft, Non-tender, Non-distended  Current Diet:  NPO    VASCULAR  Exam: Extremities warm, pink, well-perfused    MUSCULOSKELETAL  Exam: Intubated and sedated    SKIN  Exam: Good skin turgor, no skin breakdown    METABOLIC/FLUIDS/ELECTROLYTES  lactated ringers. 1000 milliLiter(s) IV Continuous <Continuous>      HEMATOLOGIC  [x] VTE Prophylaxis:   Transfusions:	[] PRBC	[] Platelets		[] FFP	[x] 1U Cryoprecipitate    INFECTIOUS DISEASE  Antimicrobials/Immunologic Medications:  ceFAZolin   IVPB      ceFAZolin   IVPB 2000 milliGRAM(s) IV Intermittent every 8 hours      Tubes/Lines/Drains    [x] Peripheral IV  [] Central Venous Line     	[] R	[] L	[] IJ	[] Fem	[] SC	Date Placed:   [x] Arterial Line		[] R	[x] L	[] Fem	[x] Rad	[] Ax	Date Placed: 6/4   [] PICC		[] Midline		[] Mediport  [x] Urinary Catheter		Date Placed: 6/4  [x] Necessity of urinary, arterial, and venous catheters discussed    LABS  --------------------------------------------------------------------------------------                        13.0   9.38  )-----------( 189      ( 04 Jun 2021 23:41 )             36.7       06-04    144  |  107  |  5<L>  ----------------------------<  109<H>  3.9   |  22  |  0.50    Ca    9.4      04 Jun 2021 23:41  Phos  4.1     06-04  Mg     1.8     06-04    TPro  6.5  /  Alb  4.1  /  TBili  2.1<H>  /  DBili  x   /  AST  64<H>  /  ALT  20  /  AlkPhos  71  06-04      Blood Gas Profile - Arterial (06.04.21 @ 23:27)    pH, Arterial: 7.53    pCO2, Arterial: 29 mmHg    pO2, Arterial: 103 mmHg    HCO3, Arterial: 27 mmol/L    Base Excess, Arterial: 1.7 mmol/L    Oxygen Saturation, Arterial: 98.4 %      --------------------------------------------------------------------------------------    IMAGING STUDIES:   CXR:   < from: Xray Chest 1 View AP/PA (06.04.21 @ 14:23) >    INTERPRETATION:    An endotracheal tube is in satisfactory position. The lungs are clear and there is no effusion or pneumothorax. No free intraperitoneal air.      COMPARISON:  None available      IMPRESSION:  Clear lungs with endotracheal tube in place.    < end of copied text >       SICU Daily Progress Note  ====================================================    Interval/Overnight Events:    -One episode of tonic clonic seizure while attempting to wean down versed, prop gtt  -2mg ativan given, currently on propofol and versed gtt  -Wean propofol as tolerating while keeping on the versed gtt  -Keppra increased to 500mg q12 hours  -D5 1/2 NS started  -TV adjusted based on ideal body weight, 440/16/5/30  -Started on DVT ppx w/ lovenox    HPI:  40 yo male presents to [facility] for preop evaluation for open radical retropubic prostatectomy, pelvic lymph node dissection.  Patient with elevated PSA levels s/p prostate biopsy and diagnosed with malignant neoplasm of prostate.  Patient reports increased urinary frequency, denies dysuria or hematuria.    Surgery Information  Open radical retropubic prostatectomy.  EBL: 6L   IV Fluids: 7L NS and 7L LR    Blood Products:  12 units pRBC, 7 FFP, 3 platelets  Complications: Acute blood loss anemia, hemorrhagic shock.  Reported hemostasis achieved, pt. was closed. 1U Cryo given in SICU and he was started on Prop, Fent, and Versed drips.      Allergies: No Known Allergies      MEDICATIONS:   --------------------------------------------------------------------------------------  Neurologic Medications  fentaNYL   Infusion 0.5 MICROgram(s)/kG/Hr IV Continuous <Continuous>  levETIRAcetam  IVPB 250 milliGRAM(s) IV Intermittent <User Schedule>  midazolam Infusion 0.05 mG/kG/Hr IV Continuous <Continuous>  propofol Infusion 20 MICROgram(s)/kG/Min IV Continuous <Continuous>    Respiratory Medications    Cardiovascular Medications    Gastrointestinal Medications  lactated ringers. 1000 milliLiter(s) IV Continuous <Continuous>    Genitourinary Medications    Hematologic/Oncologic Medications    Antimicrobial/Immunologic Medications  ceFAZolin   IVPB      ceFAZolin   IVPB 2000 milliGRAM(s) IV Intermittent every 8 hours    Endocrine/Metabolic Medications    Topical/Other Medications  chlorhexidine 0.12% Liquid 15 milliLiter(s) Oral Mucosa every 12 hours  chlorhexidine 4% Liquid 1 Application(s) Topical daily    --------------------------------------------------------------------------------------    VITAL SIGNS, INS/OUTS (last 24 hours):  --------------------------------------------------------------------------------------  ICU Vital Signs Last 24 Hrs  T(C): 36.2 (05 Jun 2021 00:00), Max: 36.9 (04 Jun 2021 06:21)  T(F): 97.1 (05 Jun 2021 00:00), Max: 98.4 (04 Jun 2021 06:21)  HR: 60 (05 Jun 2021 00:00) (60 - 108)  BP: 168/99 (04 Jun 2021 14:00) (108/88 - 168/99)  BP(mean): 119 (04 Jun 2021 14:00) (119 - 119)  ABP: 153/74 (05 Jun 2021 00:00) (123/65 - 185/95)  ABP(mean): 105 (05 Jun 2021 00:00) (73 - 132)  RR: 16 (05 Jun 2021 00:00) (16 - 16)  SpO2: 99% (05 Jun 2021 00:00) (95% - 100%)  --------------------------------------------------------------------------------------    EXAM  NEUROLOGY  Exam: Intubated and sedated    HEENT  Exam: Normocephalic, atraumatic    RESPIRATORY  Exam: Intubated  Mechanical Ventilation: Mode: AC/ CMV (Assist Control/ Continuous Mandatory Ventilation), RR (machine): 16, TV (machine): 500, FiO2: 30, PEEP: 5, ITime: 0.9, MAP: 10, PIP: 23    CARDIOVASCULAR  Exam: Regular rate and rhythm    GI/NUTRITION  Exam: Abdomen soft, Non-tender, Non-distended  Current Diet:  NPO    VASCULAR  Exam: Extremities warm, pink, well-perfused    MUSCULOSKELETAL  Exam: Intubated and sedated    SKIN  Exam: Good skin turgor, no skin breakdown    METABOLIC/FLUIDS/ELECTROLYTES  lactated ringers. 1000 milliLiter(s) IV Continuous <Continuous>      HEMATOLOGIC  [x] VTE Prophylaxis:   Transfusions:	[] PRBC	[] Platelets		[] FFP	[x] 1U Cryoprecipitate    INFECTIOUS DISEASE  Antimicrobials/Immunologic Medications:  ceFAZolin   IVPB      ceFAZolin   IVPB 2000 milliGRAM(s) IV Intermittent every 8 hours      Tubes/Lines/Drains    [x] Peripheral IV  [] Central Venous Line     	[] R	[] L	[] IJ	[] Fem	[] SC	Date Placed:   [x] Arterial Line		[] R	[x] L	[] Fem	[x] Rad	[] Ax	Date Placed: 6/4   [] PICC		[] Midline		[] Mediport  [x] Urinary Catheter		Date Placed: 6/4  [x] Necessity of urinary, arterial, and venous catheters discussed    LABS  --------------------------------------------------------------------------------------                        13.0   9.38  )-----------( 189      ( 04 Jun 2021 23:41 )             36.7       06-04    144  |  107  |  5<L>  ----------------------------<  109<H>  3.9   |  22  |  0.50    Ca    9.4      04 Jun 2021 23:41  Phos  4.1     06-04  Mg     1.8     06-04    TPro  6.5  /  Alb  4.1  /  TBili  2.1<H>  /  DBili  x   /  AST  64<H>  /  ALT  20  /  AlkPhos  71  06-04      Blood Gas Profile - Arterial (06.04.21 @ 23:27)    pH, Arterial: 7.53    pCO2, Arterial: 29 mmHg    pO2, Arterial: 103 mmHg    HCO3, Arterial: 27 mmol/L    Base Excess, Arterial: 1.7 mmol/L    Oxygen Saturation, Arterial: 98.4 %      --------------------------------------------------------------------------------------    IMAGING STUDIES:   CXR:   < from: Xray Chest 1 View AP/PA (06.04.21 @ 14:23) >    INTERPRETATION:    An endotracheal tube is in satisfactory position. The lungs are clear and there is no effusion or pneumothorax. No free intraperitoneal air.      COMPARISON:  None available      IMPRESSION:  Clear lungs with endotracheal tube in place.    < end of copied text >       SICU Daily Progress Note  ====================================================    Interval/Overnight Events:    -Two episodes of tonic clonic seizure while attempting to wean down versed, prop gtt (7 mins, then 14 mins)  -2mg ativan given for first episode, currently on propofol and versed gtt  -Two doses of 2mg ativan given for second seizure episode  -Neurology consulted  -Wean propofol as tolerating while keeping on the versed gtt  -Keppra increased to 500mg q12 hours  -D5 1/2 NS started  -TV adjusted based on ideal body weight, 440/16/5/30  -Started on DVT ppx w/ lovenox    HPI:  42 yo male presents to [facility] for preop evaluation for open radical retropubic prostatectomy, pelvic lymph node dissection.  Patient with elevated PSA levels s/p prostate biopsy and diagnosed with malignant neoplasm of prostate.  Patient reports increased urinary frequency, denies dysuria or hematuria.    Surgery Information  Open radical retropubic prostatectomy.  EBL: 6L   IV Fluids: 7L NS and 7L LR    Blood Products:  12 units pRBC, 7 FFP, 3 platelets  Complications: Acute blood loss anemia, hemorrhagic shock.  Reported hemostasis achieved, pt. was closed. 1U Cryo given in SICU and he was started on Prop, Fent, and Versed drips.      Allergies: No Known Allergies      MEDICATIONS:   --------------------------------------------------------------------------------------  Neurologic Medications  fentaNYL   Infusion 0.5 MICROgram(s)/kG/Hr IV Continuous <Continuous>  levETIRAcetam  IVPB 250 milliGRAM(s) IV Intermittent <User Schedule>  midazolam Infusion 0.05 mG/kG/Hr IV Continuous <Continuous>  propofol Infusion 20 MICROgram(s)/kG/Min IV Continuous <Continuous>    Respiratory Medications    Cardiovascular Medications    Gastrointestinal Medications  lactated ringers. 1000 milliLiter(s) IV Continuous <Continuous>    Genitourinary Medications    Hematologic/Oncologic Medications    Antimicrobial/Immunologic Medications  ceFAZolin   IVPB      ceFAZolin   IVPB 2000 milliGRAM(s) IV Intermittent every 8 hours    Endocrine/Metabolic Medications    Topical/Other Medications  chlorhexidine 0.12% Liquid 15 milliLiter(s) Oral Mucosa every 12 hours  chlorhexidine 4% Liquid 1 Application(s) Topical daily    --------------------------------------------------------------------------------------    VITAL SIGNS, INS/OUTS (last 24 hours):  --------------------------------------------------------------------------------------  ICU Vital Signs Last 24 Hrs  T(C): 36.2 (05 Jun 2021 00:00), Max: 36.9 (04 Jun 2021 06:21)  T(F): 97.1 (05 Jun 2021 00:00), Max: 98.4 (04 Jun 2021 06:21)  HR: 60 (05 Jun 2021 00:00) (60 - 108)  BP: 168/99 (04 Jun 2021 14:00) (108/88 - 168/99)  BP(mean): 119 (04 Jun 2021 14:00) (119 - 119)  ABP: 153/74 (05 Jun 2021 00:00) (123/65 - 185/95)  ABP(mean): 105 (05 Jun 2021 00:00) (73 - 132)  RR: 16 (05 Jun 2021 00:00) (16 - 16)  SpO2: 99% (05 Jun 2021 00:00) (95% - 100%)  --------------------------------------------------------------------------------------    EXAM  NEUROLOGY  Exam: Intubated and sedated    HEENT  Exam: Normocephalic, atraumatic    RESPIRATORY  Exam: Intubated  Mechanical Ventilation: Mode: AC/ CMV (Assist Control/ Continuous Mandatory Ventilation), RR (machine): 16, TV (machine): 500, FiO2: 30, PEEP: 5, ITime: 0.9, MAP: 10, PIP: 23    CARDIOVASCULAR  Exam: Regular rate and rhythm    GI/NUTRITION  Exam: Abdomen soft, Non-tender, Non-distended  Current Diet:  NPO    VASCULAR  Exam: Extremities warm, pink, well-perfused    MUSCULOSKELETAL  Exam: Intubated and sedated    SKIN  Exam: Good skin turgor, no skin breakdown    METABOLIC/FLUIDS/ELECTROLYTES  lactated ringers. 1000 milliLiter(s) IV Continuous <Continuous>      HEMATOLOGIC  [x] VTE Prophylaxis:   Transfusions:	[] PRBC	[] Platelets		[] FFP	[x] 1U Cryoprecipitate    INFECTIOUS DISEASE  Antimicrobials/Immunologic Medications:  ceFAZolin   IVPB      ceFAZolin   IVPB 2000 milliGRAM(s) IV Intermittent every 8 hours      Tubes/Lines/Drains    [x] Peripheral IV  [] Central Venous Line     	[] R	[] L	[] IJ	[] Fem	[] SC	Date Placed:   [x] Arterial Line		[] R	[x] L	[] Fem	[x] Rad	[] Ax	Date Placed: 6/4   [] PICC		[] Midline		[] Mediport  [x] Urinary Catheter		Date Placed: 6/4  [x] Necessity of urinary, arterial, and venous catheters discussed    LABS  --------------------------------------------------------------------------------------                        13.0   9.38  )-----------( 189      ( 04 Jun 2021 23:41 )             36.7       06-04    144  |  107  |  5<L>  ----------------------------<  109<H>  3.9   |  22  |  0.50    Ca    9.4      04 Jun 2021 23:41  Phos  4.1     06-04  Mg     1.8     06-04    TPro  6.5  /  Alb  4.1  /  TBili  2.1<H>  /  DBili  x   /  AST  64<H>  /  ALT  20  /  AlkPhos  71  06-04      Blood Gas Profile - Arterial (06.04.21 @ 23:27)    pH, Arterial: 7.53    pCO2, Arterial: 29 mmHg    pO2, Arterial: 103 mmHg    HCO3, Arterial: 27 mmol/L    Base Excess, Arterial: 1.7 mmol/L    Oxygen Saturation, Arterial: 98.4 %      --------------------------------------------------------------------------------------    IMAGING STUDIES:   CXR:   < from: Xray Chest 1 View AP/PA (06.04.21 @ 14:23) >    INTERPRETATION:    An endotracheal tube is in satisfactory position. The lungs are clear and there is no effusion or pneumothorax. No free intraperitoneal air.      COMPARISON:  None available      IMPRESSION:  Clear lungs with endotracheal tube in place.    < end of copied text >

## 2021-06-05 NOTE — PROGRESS NOTE ADULT - ASSESSMENT
ASSESSMENT:  41y Male w/ recently diagnosed prostate cancer s/p radical prostatectomy.  Operative course c/b 5 units blood loss, s/p 12 units pRBC, 7 units FFP, 3 units platelets, 14L crystalloids, and 1L albumin, admitted to the SICU intubated and for hemorrhage watch.    #Neurologic  -Intubated and sedated; Volume A/C: 16/500/5/60%  -Sedated on propofol and fentanyl gtt  -Keppra 250mg IVPB TID for hx of seizure disorder  -On versed gtt for agitation 2/2 daily alcohol use     Respiratory:  -Intubated Volume A/C: 16/500/5/60%  -Monitor ABGs    Cardiovascular  -Hypertensive on metoprolol at home  -Holding anti-hypertensives for now    Gastrointestinal/Nutrition:  -NPO    #Renal/Genitourinary  -S/p radical prostatectomy  -Hypokalemic s/p repletion  -Left PANCHO to pleuravac suction 710cc  -Follow I/Os  -Trend CR  -Maintenance fluids LR at 100cc/hr    Hematologic: no acute issues  -Trend CBC  -Holding chemical VTE ppx  - 1U cryo given 6/4    Infectious Disease  -Ancef 2g q 8 hours (started on 6/4/2021)  -Trend WBC count, fever curve    Tubes/Lines/Drains:  -L radial a-line  -PIVs  -White catheter  -L PANCHO to pleuravac suction    Endocrine: No acute issues  -Monitor serum glucose on BMP    Disposition: SICU ASSESSMENT:  41y Male w/ recently diagnosed prostate cancer s/p radical prostatectomy.  Operative course c/b 5 units blood loss, s/p 12 units pRBC, 7 units FFP, 3 units platelets, 14L crystalloids, and 1L albumin, admitted to the SICU intubated and for hemorrhage watch.    #Neurologic  -Intubated and sedated; Volume A/C: 16/440/5/30%  -Sedated on propofol and fentanyl gtt  -Keppra 250mg IVPB TID for hx of seizure disorder  -On versed gtt for agitation 2/2 daily alcohol use     Respiratory:  -Intubated Volume A/C: 16/500/5/60%  -Monitor ABGs    Cardiovascular  -Hypertensive on metoprolol at home  -Holding anti-hypertensives for now    Gastrointestinal/Nutrition:  -NPO    #Renal/Genitourinary  -S/p radical prostatectomy  -Follow I/Os  -Trend CR, electrolytes  -Maintenance fluids D5 1/2NS at 75cc/hr    Hematologic: no acute issues  -Trend CBC  -Holding chemical VTE ppx  -1U cryo given 6/4    Infectious Disease  -Ancef 2g q 8 hours (started on 6/4/2021)  -Trend WBC count, fever curve    Tubes/Lines/Drains:  -L radial a-line  -PIVs  -White catheter  -L PANCHO to pleuravac suction    Endocrine: No acute issues  -Monitor serum glucose on BMP    Disposition: SICU

## 2021-06-05 NOTE — PROGRESS NOTE ADULT - ATTENDING COMMENTS
Agitated delirium  a.  Wean propofol, maintain versed/fentanyl gtt    Respiratory Alkalosis  a.  decrease TV to 440ml  b.  Re-check ABG    At risk for malnutrition  a.  Insert YASMANI FT and start feeds    Acute posthemorrhage anemia  a.  S/p MTP  b.  Trend H and H and transfuse as needed  c.  Decrease and change IVF to d541/2 at 50ml/hr

## 2021-06-06 LAB
ANION GAP SERPL CALC-SCNC: 9 MMOL/L — SIGNIFICANT CHANGE UP (ref 7–14)
BLOOD GAS ARTERIAL - LYTES,HGB,ICA,LACT RESULT: SIGNIFICANT CHANGE UP
BUN SERPL-MCNC: 5 MG/DL — LOW (ref 7–23)
CALCIUM SERPL-MCNC: 8.1 MG/DL — LOW (ref 8.4–10.5)
CHLORIDE SERPL-SCNC: 103 MMOL/L — SIGNIFICANT CHANGE UP (ref 98–107)
CO2 SERPL-SCNC: 24 MMOL/L — SIGNIFICANT CHANGE UP (ref 22–31)
CREAT SERPL-MCNC: 0.51 MG/DL — SIGNIFICANT CHANGE UP (ref 0.5–1.3)
GLUCOSE SERPL-MCNC: 100 MG/DL — HIGH (ref 70–99)
HCT VFR BLD CALC: 35.8 % — LOW (ref 39–50)
HGB BLD-MCNC: 11.9 G/DL — LOW (ref 13–17)
MAGNESIUM SERPL-MCNC: 1.7 MG/DL — SIGNIFICANT CHANGE UP (ref 1.6–2.6)
MCHC RBC-ENTMCNC: 28.2 PG — SIGNIFICANT CHANGE UP (ref 27–34)
MCHC RBC-ENTMCNC: 33.2 GM/DL — SIGNIFICANT CHANGE UP (ref 32–36)
MCV RBC AUTO: 84.8 FL — SIGNIFICANT CHANGE UP (ref 80–100)
NRBC # BLD: 0 /100 WBCS — SIGNIFICANT CHANGE UP
NRBC # FLD: 0 K/UL — SIGNIFICANT CHANGE UP
PHOSPHATE SERPL-MCNC: 3.3 MG/DL — SIGNIFICANT CHANGE UP (ref 2.5–4.5)
PLATELET # BLD AUTO: 133 K/UL — LOW (ref 150–400)
POTASSIUM SERPL-MCNC: 3.7 MMOL/L — SIGNIFICANT CHANGE UP (ref 3.5–5.3)
POTASSIUM SERPL-SCNC: 3.7 MMOL/L — SIGNIFICANT CHANGE UP (ref 3.5–5.3)
RBC # BLD: 4.22 M/UL — SIGNIFICANT CHANGE UP (ref 4.2–5.8)
RBC # FLD: 16 % — HIGH (ref 10.3–14.5)
SODIUM SERPL-SCNC: 136 MMOL/L — SIGNIFICANT CHANGE UP (ref 135–145)
WBC # BLD: 8.65 K/UL — SIGNIFICANT CHANGE UP (ref 3.8–10.5)
WBC # FLD AUTO: 8.65 K/UL — SIGNIFICANT CHANGE UP (ref 3.8–10.5)

## 2021-06-06 PROCEDURE — 71045 X-RAY EXAM CHEST 1 VIEW: CPT | Mod: 26

## 2021-06-06 PROCEDURE — 99292 CRITICAL CARE ADDL 30 MIN: CPT

## 2021-06-06 PROCEDURE — 99291 CRITICAL CARE FIRST HOUR: CPT

## 2021-06-06 RX ORDER — POTASSIUM CHLORIDE 20 MEQ
10 PACKET (EA) ORAL
Refills: 0 | Status: COMPLETED | OUTPATIENT
Start: 2021-06-06 | End: 2021-06-06

## 2021-06-06 RX ORDER — SODIUM CHLORIDE 9 MG/ML
500 INJECTION, SOLUTION INTRAVENOUS ONCE
Refills: 0 | Status: COMPLETED | OUTPATIENT
Start: 2021-06-06 | End: 2021-06-06

## 2021-06-06 RX ORDER — LEVETIRACETAM 250 MG/1
1000 TABLET, FILM COATED ORAL EVERY 12 HOURS
Refills: 0 | Status: DISCONTINUED | OUTPATIENT
Start: 2021-06-06 | End: 2021-06-09

## 2021-06-06 RX ORDER — DEXMEDETOMIDINE HYDROCHLORIDE IN 0.9% SODIUM CHLORIDE 4 UG/ML
0.2 INJECTION INTRAVENOUS
Qty: 400 | Refills: 0 | Status: DISCONTINUED | OUTPATIENT
Start: 2021-06-06 | End: 2021-06-07

## 2021-06-06 RX ORDER — MAGNESIUM SULFATE 500 MG/ML
2 VIAL (ML) INJECTION ONCE
Refills: 0 | Status: COMPLETED | OUTPATIENT
Start: 2021-06-06 | End: 2021-06-06

## 2021-06-06 RX ADMIN — Medication 1 MILLIGRAM(S): at 13:40

## 2021-06-06 RX ADMIN — Medication 100 MILLIEQUIVALENT(S): at 08:28

## 2021-06-06 RX ADMIN — SODIUM CHLORIDE 2000 MILLILITER(S): 9 INJECTION, SOLUTION INTRAVENOUS at 22:14

## 2021-06-06 RX ADMIN — LEVETIRACETAM 400 MILLIGRAM(S): 250 TABLET, FILM COATED ORAL at 11:26

## 2021-06-06 RX ADMIN — LEVETIRACETAM 400 MILLIGRAM(S): 250 TABLET, FILM COATED ORAL at 05:05

## 2021-06-06 RX ADMIN — SODIUM CHLORIDE 1000 MILLILITER(S): 9 INJECTION, SOLUTION INTRAVENOUS at 11:26

## 2021-06-06 RX ADMIN — PANTOPRAZOLE SODIUM 40 MILLIGRAM(S): 20 TABLET, DELAYED RELEASE ORAL at 11:31

## 2021-06-06 RX ADMIN — Medication 100 MILLIEQUIVALENT(S): at 06:53

## 2021-06-06 RX ADMIN — Medication 2 MILLIGRAM(S): at 04:38

## 2021-06-06 RX ADMIN — Medication 2 MILLIGRAM(S): at 16:14

## 2021-06-06 RX ADMIN — CHLORHEXIDINE GLUCONATE 1 APPLICATION(S): 213 SOLUTION TOPICAL at 12:26

## 2021-06-06 RX ADMIN — DEXMEDETOMIDINE HYDROCHLORIDE IN 0.9% SODIUM CHLORIDE 2.91 MICROGRAM(S)/KG/HR: 4 INJECTION INTRAVENOUS at 22:14

## 2021-06-06 RX ADMIN — CHLORHEXIDINE GLUCONATE 15 MILLILITER(S): 213 SOLUTION TOPICAL at 17:30

## 2021-06-06 RX ADMIN — Medication 2 MILLIGRAM(S): at 10:42

## 2021-06-06 RX ADMIN — PROPOFOL 6.97 MICROGRAM(S)/KG/MIN: 10 INJECTION, EMULSION INTRAVENOUS at 08:31

## 2021-06-06 RX ADMIN — Medication 2000 MILLIGRAM(S): at 05:05

## 2021-06-06 RX ADMIN — Medication 1 MILLIGRAM(S): at 10:37

## 2021-06-06 RX ADMIN — FENTANYL CITRATE 2.91 MICROGRAM(S)/KG/HR: 50 INJECTION INTRAVENOUS at 05:16

## 2021-06-06 RX ADMIN — SODIUM CHLORIDE 75 MILLILITER(S): 9 INJECTION, SOLUTION INTRAVENOUS at 20:00

## 2021-06-06 RX ADMIN — MIDAZOLAM HYDROCHLORIDE 2.91 MG/KG/HR: 1 INJECTION, SOLUTION INTRAMUSCULAR; INTRAVENOUS at 05:16

## 2021-06-06 RX ADMIN — Medication 1 MILLIGRAM(S): at 21:15

## 2021-06-06 RX ADMIN — Medication 1 MILLIGRAM(S): at 21:38

## 2021-06-06 RX ADMIN — Medication 2 MILLIGRAM(S): at 04:28

## 2021-06-06 RX ADMIN — PROPOFOL 6.97 MICROGRAM(S)/KG/MIN: 10 INJECTION, EMULSION INTRAVENOUS at 20:00

## 2021-06-06 RX ADMIN — ENOXAPARIN SODIUM 40 MILLIGRAM(S): 100 INJECTION SUBCUTANEOUS at 11:26

## 2021-06-06 RX ADMIN — CHLORHEXIDINE GLUCONATE 15 MILLILITER(S): 213 SOLUTION TOPICAL at 05:05

## 2021-06-06 RX ADMIN — Medication 100 MILLIEQUIVALENT(S): at 09:50

## 2021-06-06 RX ADMIN — Medication 50 GRAM(S): at 08:28

## 2021-06-06 RX ADMIN — FENTANYL CITRATE 2.91 MICROGRAM(S)/KG/HR: 50 INJECTION INTRAVENOUS at 20:00

## 2021-06-06 RX ADMIN — FENTANYL CITRATE 2.91 MICROGRAM(S)/KG/HR: 50 INJECTION INTRAVENOUS at 08:31

## 2021-06-06 RX ADMIN — Medication 1 MILLIGRAM(S): at 17:30

## 2021-06-06 RX ADMIN — Medication 2 MILLIGRAM(S): at 13:33

## 2021-06-06 NOTE — PROGRESS NOTE ADULT - SUBJECTIVE AND OBJECTIVE BOX
SICU Daily Progress Note  ====================================================    Interval/Overnight Events:    -Two episodes of tonic clonic seizure while attempting to wean down versed, prop gtt (7 mins, then 14 mins)  -2mg ativan given for first episode, currently on propofol and versed gtt  -Two doses of 2mg ativan given for second seizure episode  -Neurology consulted, keep on all sedation to obtain a video EEG  -Keppra increased to 500mg q12 hours  -D5 1/2 NS started  -TV adjusted based on ideal body weight, 440/16/5/30  -Started on DVT ppx w/ lovenox    HPI:  40 yo male presents to [facility] for preop evaluation for open radical retropubic prostatectomy, pelvic lymph node dissection.  Patient with elevated PSA levels s/p prostate biopsy and diagnosed with malignant neoplasm of prostate.  Patient reports increased urinary frequency, denies dysuria or hematuria.    Surgery Information  Open radical retropubic prostatectomy.  EBL: 6L   IV Fluids: 7L NS and 7L LR    Blood Products:  12 units pRBC, 7 FFP, 3 platelets  Complications: Acute blood loss anemia, hemorrhagic shock.  Reported hemostasis achieved, pt. was closed. 1U Cryo given in SICU and he was started on Prop, Fent, and Versed drips.      Allergies: No Known Allergies      MEDICATIONS:   --------------------------------------------------------------------------------------  Neurologic Medications  fentaNYL   Infusion 0.5 MICROgram(s)/kG/Hr IV Continuous <Continuous>  levETIRAcetam  IVPB 250 milliGRAM(s) IV Intermittent <User Schedule>  midazolam Infusion 0.05 mG/kG/Hr IV Continuous <Continuous>  propofol Infusion 20 MICROgram(s)/kG/Min IV Continuous <Continuous>    Respiratory Medications    Cardiovascular Medications    Gastrointestinal Medications  lactated ringers. 1000 milliLiter(s) IV Continuous <Continuous>    Genitourinary Medications    Hematologic/Oncologic Medications    Antimicrobial/Immunologic Medications  ceFAZolin   IVPB      ceFAZolin   IVPB 2000 milliGRAM(s) IV Intermittent every 8 hours    Endocrine/Metabolic Medications    Topical/Other Medications  chlorhexidine 0.12% Liquid 15 milliLiter(s) Oral Mucosa every 12 hours  chlorhexidine 4% Liquid 1 Application(s) Topical daily    --------------------------------------------------------------------------------------    VITAL SIGNS, INS/OUTS (last 24 hours):  --------------------------------------------------------------------------------------  ICU Vital Signs Last 24 Hrs  T(C): 36.2 (05 Jun 2021 00:00), Max: 36.9 (04 Jun 2021 06:21)  T(F): 97.1 (05 Jun 2021 00:00), Max: 98.4 (04 Jun 2021 06:21)  HR: 60 (05 Jun 2021 00:00) (60 - 108)  BP: 168/99 (04 Jun 2021 14:00) (108/88 - 168/99)  BP(mean): 119 (04 Jun 2021 14:00) (119 - 119)  ABP: 153/74 (05 Jun 2021 00:00) (123/65 - 185/95)  ABP(mean): 105 (05 Jun 2021 00:00) (73 - 132)  RR: 16 (05 Jun 2021 00:00) (16 - 16)  SpO2: 99% (05 Jun 2021 00:00) (95% - 100%)  --------------------------------------------------------------------------------------    EXAM  NEUROLOGY  Exam: Intubated and sedated    HEENT  Exam: Normocephalic, atraumatic    RESPIRATORY  Exam: Intubated  Mechanical Ventilation: Mode: AC/ CMV (Assist Control/ Continuous Mandatory Ventilation), RR (machine): 16, TV (machine): 500, FiO2: 30, PEEP: 5, ITime: 0.9, MAP: 10, PIP: 23    CARDIOVASCULAR  Exam: Regular rate and rhythm    GI/NUTRITION  Exam: Abdomen soft, Non-tender, Non-distended  Current Diet:  NPO    VASCULAR  Exam: Extremities warm, pink, well-perfused    MUSCULOSKELETAL  Exam: Intubated and sedated    SKIN  Exam: Good skin turgor, no skin breakdown    METABOLIC/FLUIDS/ELECTROLYTES  lactated ringers. 1000 milliLiter(s) IV Continuous <Continuous>      HEMATOLOGIC  [x] VTE Prophylaxis:   Transfusions:	[] PRBC	[] Platelets		[] FFP	[x] 1U Cryoprecipitate    INFECTIOUS DISEASE  Antimicrobials/Immunologic Medications:  ceFAZolin   IVPB      ceFAZolin   IVPB 2000 milliGRAM(s) IV Intermittent every 8 hours      Tubes/Lines/Drains    [x] Peripheral IV  [] Central Venous Line     	[] R	[] L	[] IJ	[] Fem	[] SC	Date Placed:   [x] Arterial Line		[] R	[x] L	[] Fem	[x] Rad	[] Ax	Date Placed: 6/4   [] PICC		[] Midline		[] Mediport  [x] Urinary Catheter		Date Placed: 6/4  [x] Necessity of urinary, arterial, and venous catheters discussed    LABS  --------------------------------------------------------------------------------------                        13.0   9.38  )-----------( 189      ( 04 Jun 2021 23:41 )             36.7       06-04    144  |  107  |  5<L>  ----------------------------<  109<H>  3.9   |  22  |  0.50    Ca    9.4      04 Jun 2021 23:41  Phos  4.1     06-04  Mg     1.8     06-04    TPro  6.5  /  Alb  4.1  /  TBili  2.1<H>  /  DBili  x   /  AST  64<H>  /  ALT  20  /  AlkPhos  71  06-04      Blood Gas Profile - Arterial (06.04.21 @ 23:27)    pH, Arterial: 7.53    pCO2, Arterial: 29 mmHg    pO2, Arterial: 103 mmHg    HCO3, Arterial: 27 mmol/L    Base Excess, Arterial: 1.7 mmol/L    Oxygen Saturation, Arterial: 98.4 %      --------------------------------------------------------------------------------------    IMAGING STUDIES:   CXR:   < from: Xray Chest 1 View AP/PA (06.04.21 @ 14:23) >    INTERPRETATION:    An endotracheal tube is in satisfactory position. The lungs are clear and there is no effusion or pneumothorax. No free intraperitoneal air.      COMPARISON:  None available      IMPRESSION:  Clear lungs with endotracheal tube in place.    < end of copied text >

## 2021-06-06 NOTE — PROGRESS NOTE ADULT - ATTENDING COMMENTS
Remains intubated and sedated.  Prelim Head CT normal.  Follow  neuro recommendations.  EEG planned for today.  Continue SICU care  Labs reviewed, Hct stable.  White urine clear, PANCHO output decreased.   Midline incision healing well.

## 2021-06-06 NOTE — PROGRESS NOTE ADULT - SUBJECTIVE AND OBJECTIVE BOX
Interval events:   3 episodes to tonic clonic seizures, given ativan  Neuro consult: CTA, CTH pending final reads, EEG pending (continue all sedation to obtain EEG)  Keppra increased     SUBJECTIVE:  Intubated and sedated       OBJECTIVE:  Vital Signs Last 24 Hrs  T(C): 37.1 (06 Jun 2021 04:00), Max: 37.1 (06 Jun 2021 04:00)  T(F): 98.8 (06 Jun 2021 04:00), Max: 98.8 (06 Jun 2021 04:00)  HR: 88 (06 Jun 2021 07:00) (71 - 189)  BP: 102/71 (05 Jun 2021 18:00) (96/74 - 151/95)  BP(mean): 82 (05 Jun 2021 18:00) (82 - 111)  RR: 14 (06 Jun 2021 07:00) (14 - 25)  SpO2: 99% (06 Jun 2021 07:00) (88% - 100%)    Physical Examination:  GEN: sedated   PULM: intubated   ABD: soft, nontender, nondistended, dressing c/d/i, PANCHO SS  : lee secured, clear urine       LABS:                        11.9   8.65  )-----------( 133      ( 06 Jun 2021 01:26 )             35.8       06-06    136  |  103  |  5<L>  ----------------------------<  100<H>  3.7   |  24  |  0.51    Ca    8.1<L>      06 Jun 2021 01:26  Phos  3.3     06-06  Mg     1.7     06-06    TPro  6.5  /  Alb  4.1  /  TBili  2.1<H>  /  DBili  x   /  AST  64<H>  /  ALT  20  /  AlkPhos  71  06-04

## 2021-06-06 NOTE — PROGRESS NOTE ADULT - ASSESSMENT
41y Male w/ recently diagnosed prostate cancer s/p radical prostatectomy.  Operative course c/b 5 units blood loss, s/p 12 units pRBC, 7 units FFP, 3 units platelets, 14L crystalloids, and 1L albumin, admitted to the SICU intubated and for hemorrhage watch.    - Intubated and sedated, wean sedation/SBT per SICU  - NPO while intubated   - replete electrolytes prn   - Trend Cr and I/Os  - Trend H/H, stable last night 13/36  - Discontinue ancef   - Continue lee catheter   - f/up neuro, EEG  - f/up CTH and CTA final read

## 2021-06-06 NOTE — PROGRESS NOTE ADULT - ATTENDING COMMENTS
Seizure disorder  a.  With multiple seizure episodes overnight requiring prn Ativan  b.  Maintain Keppra 100mg BID  c.  Add fosphenytoin  d.  Video EEG in progress  e.  Appreciate  neurology input  f.  CT to be reviewed    Respiratory insufficiency secondary to inability to wean  a.  Continue mechanical ventilatory support at this time  b.  Daily ABG    Malnutrition  a.  NPO  b.  Start TF     Acute posthemorrhage anemia  a.  Stable  b.  Transfuse prn for hGB < 7  c.  On lovenox for DVT prophylaxis Seizure disorder  a.  With multiple seizure episodes overnight requiring prn Ativan  b.  Maintain Keppra 100mg BID  c.  Add fosphenytoin  d.  Video EEG in progress  e.  Appreciate  neurology input  f.  CT to be reviewed    Respiratory insufficiency secondary to inability to wean  a.  Continue mechanical ventilatory support at this time  b.  Daily ABG  c.  Continue propofol  d.  Transition versed gtt to ativan IV RTC  Malnutrition  a.  NPO  b.  Start TF     Acute posthemorrhage anemia  a.  Stable  b.  Transfuse prn for hGB < 7  c.  On lovenox for DVT prophylaxis

## 2021-06-06 NOTE — PROGRESS NOTE ADULT - ASSESSMENT
ASSESSMENT:  41y Male w/ recently diagnosed prostate cancer s/p radical prostatectomy.  Operative course c/b 5 units blood loss, s/p 12 units pRBC, 7 units FFP, 3 units platelets, 14L crystalloids, and 1L albumin, admitted to the SICU intubated and for hemorrhage watch.    #Neurologic  -Intubated and sedated; Volume A/C: 16/440/5/30%  -Sedated on propofol and fentanyl gtt  -Keppra 500mg BID for hx of seizure disorder  -On versed gtt for agitation 2/2 daily alcohol use  -Two episodes of seizure-like activity requiring push doses of ativan  -Neurology consulted  -Video EEG  -Call neurology back if seizing while under sedation     Respiratory:  -Intubated Volume A/C: 16/500/5/60%  -Monitor ABGs    Cardiovascular  -Hypertensive on metoprolol at home  -Holding anti-hypertensives for now    Gastrointestinal/Nutrition:  -NPO    #Renal/Genitourinary  -S/p radical prostatectomy  -Follow I/Os  -Trend CR, electrolytes  -Maintenance fluids D5 1/2NS at 75cc/hr    Hematologic: no acute issues  -Trend CBC  -Started on lovenox for DVT ppx    Infectious Disease  -Ancef 2g q 8 hours (started on 6/4/2021)  -Trend WBC count, fever curve    Tubes/Lines/Drains:  -L radial a-line  -PIVs  -White catheter  -L PANCHO to pleuravac suction    Endocrine: No acute issues  -Monitor serum glucose on BMP    Disposition: SICU ASSESSMENT:  41y Male w/ recently diagnosed prostate cancer s/p radical prostatectomy.  Operative course c/b 5 units blood loss, s/p 12 units pRBC, 7 units FFP, 3 units platelets, 14L crystalloids, and 1L albumin, admitted to the SICU intubated and for hemorrhage watch.    #Neurologic: several seizures in the morning, responsive to ativan  -Intubated and sedated; Volume A/C: 16/440/5/30%  -Sedated on propofol and versed gtt, will wean versed off  -Ativan 1mg q4 added for ppx   -Keppra 500mg BID for hx of seizure disorder  -On versed gtt for agitation 2/2 daily alcohol use  -Two episodes of seizure-like activity requiring push doses of ativan  -Neurology consulted, appreciating their recommendations  -Video EEG  -f/u official CTA head/neck read     Respiratory:  -Intubated Volume A/C: 16/500/5/60%  -Monitor ABGs    Cardiovascular  -Hypertensive on metoprolol at home  -Holding anti-hypertensives for now    Gastrointestinal/Nutrition:  -NPO  -Consider enteral access when seizure activity is better controlled    #Renal/Genitourinary  -S/p radical prostatectomy  -Follow I/Os  -Trend CR, electrolytes  -Maintenance fluids D5 1/2NS at 75cc/hr    Hematologic: no acute issues  -Trend CBC  -Started on lovenox for DVT ppx    Infectious Disease  -Ancef 2g q 8 hours (started on 6/4/2021)  -Trend WBC count, fever curve    Tubes/Lines/Drains:  -L radial a-line  -PIVs  -White catheter  -L PANCHO to pleuravac suction    Endocrine: No acute issues  -Monitor serum glucose on BMP    Disposition: SICU ASSESSMENT:  41y Male w/ recently diagnosed prostate cancer s/p radical prostatectomy.  Operative course c/b 5 units blood loss, s/p 12 units pRBC, 7 units FFP, 3 units platelets, 14L crystalloids, and 1L albumin, admitted to the SICU intubated and for hemorrhage watch.    #Neurologic: several seizures in the morning responsive to ativan  -Intubated and sedated; Volume A/C: 16/440/5/30%  -Sedated on propofol and versed gtt, will wean versed off  -Ativan 1mg q4 added for ppx   -Keppra 500mg BID for hx of seizure disorder  -On versed gtt for agitation 2/2 daily alcohol use  -Two episodes of seizure-like activity requiring push doses of ativan  -Neurology consulted, appreciating their recommendations  -Video EEG  -CTA head/neck negative     Respiratory:  -Intubated Volume A/C: 16/500/5/60%  -Monitor ABGs    Cardiovascular  -Hypertensive on metoprolol at home  -Holding anti-hypertensives for now    Gastrointestinal/Nutrition:  -NPO  -Consider enteral access when seizure activity is better controlled    #Renal/Genitourinary  -S/p radical prostatectomy  -Follow I/Os  -Trend CR, electrolytes  -Maintenance fluids D5 1/2NS at 75cc/hr    Hematologic: no acute issues  -Trend CBC  -Started on lovenox for DVT ppx    Infectious Disease  -Ancef 2g q 8 hours (started on 6/4/2021)  -Trend WBC count, fever curve    Tubes/Lines/Drains:  -L radial a-line  -PIVs  -White catheter  -L PANCHO to pleuravac suction    Endocrine: No acute issues  -Monitor serum glucose on BMP    Disposition: SICU

## 2021-06-06 NOTE — CHART NOTE - NSCHARTNOTEFT_GEN_A_CORE
Spoke with neurology today and patient now having multiple episodes of seizure like activity while on EEG. Currently recommended continuing sedation and not adding any new anti-seizure medications. Keppra was increased to 1000 BID and he is currently on propofol, Ativan, and Precedex. Video EEG running and no definitive seizure activity noted.

## 2021-06-07 PROBLEM — I10 ESSENTIAL (PRIMARY) HYPERTENSION: Chronic | Status: ACTIVE | Noted: 2021-05-20

## 2021-06-07 PROBLEM — C61 MALIGNANT NEOPLASM OF PROSTATE: Chronic | Status: ACTIVE | Noted: 2021-05-20

## 2021-06-07 LAB
ANION GAP SERPL CALC-SCNC: 9 MMOL/L — SIGNIFICANT CHANGE UP (ref 7–14)
BLOOD GAS ARTERIAL - LYTES,HGB,ICA,LACT RESULT: SIGNIFICANT CHANGE UP
BUN SERPL-MCNC: 4 MG/DL — LOW (ref 7–23)
CALCIUM SERPL-MCNC: 7.9 MG/DL — LOW (ref 8.4–10.5)
CHLORIDE SERPL-SCNC: 107 MMOL/L — SIGNIFICANT CHANGE UP (ref 98–107)
CO2 SERPL-SCNC: 23 MMOL/L — SIGNIFICANT CHANGE UP (ref 22–31)
CREAT SERPL-MCNC: 0.51 MG/DL — SIGNIFICANT CHANGE UP (ref 0.5–1.3)
GLUCOSE SERPL-MCNC: 102 MG/DL — HIGH (ref 70–99)
HCT VFR BLD CALC: 35.5 % — LOW (ref 39–50)
HGB BLD-MCNC: 11.3 G/DL — LOW (ref 13–17)
MAGNESIUM SERPL-MCNC: 1.5 MG/DL — LOW (ref 1.6–2.6)
MCHC RBC-ENTMCNC: 28.5 PG — SIGNIFICANT CHANGE UP (ref 27–34)
MCHC RBC-ENTMCNC: 31.8 GM/DL — LOW (ref 32–36)
MCV RBC AUTO: 89.4 FL — SIGNIFICANT CHANGE UP (ref 80–100)
NRBC # BLD: 0 /100 WBCS — SIGNIFICANT CHANGE UP
NRBC # FLD: 0 K/UL — SIGNIFICANT CHANGE UP
PHOSPHATE SERPL-MCNC: 2.8 MG/DL — SIGNIFICANT CHANGE UP (ref 2.5–4.5)
PLATELET # BLD AUTO: 118 K/UL — LOW (ref 150–400)
POTASSIUM SERPL-MCNC: 4 MMOL/L — SIGNIFICANT CHANGE UP (ref 3.5–5.3)
POTASSIUM SERPL-SCNC: 4 MMOL/L — SIGNIFICANT CHANGE UP (ref 3.5–5.3)
RBC # BLD: 3.97 M/UL — LOW (ref 4.2–5.8)
RBC # FLD: 15.9 % — HIGH (ref 10.3–14.5)
SODIUM SERPL-SCNC: 139 MMOL/L — SIGNIFICANT CHANGE UP (ref 135–145)
WBC # BLD: 8.2 K/UL — SIGNIFICANT CHANGE UP (ref 3.8–10.5)
WBC # FLD AUTO: 8.2 K/UL — SIGNIFICANT CHANGE UP (ref 3.8–10.5)

## 2021-06-07 PROCEDURE — 95720 EEG PHY/QHP EA INCR W/VEEG: CPT

## 2021-06-07 PROCEDURE — 99233 SBSQ HOSP IP/OBS HIGH 50: CPT | Mod: GC

## 2021-06-07 PROCEDURE — 99292 CRITICAL CARE ADDL 30 MIN: CPT

## 2021-06-07 PROCEDURE — 71045 X-RAY EXAM CHEST 1 VIEW: CPT | Mod: 26

## 2021-06-07 PROCEDURE — 99291 CRITICAL CARE FIRST HOUR: CPT

## 2021-06-07 PROCEDURE — 71045 X-RAY EXAM CHEST 1 VIEW: CPT | Mod: 26,77

## 2021-06-07 RX ORDER — PROPOFOL 10 MG/ML
19.99 INJECTION, EMULSION INTRAVENOUS
Qty: 1000 | Refills: 0 | Status: DISCONTINUED | OUTPATIENT
Start: 2021-06-07 | End: 2021-06-08

## 2021-06-07 RX ORDER — MAGNESIUM SULFATE 500 MG/ML
2 VIAL (ML) INJECTION ONCE
Refills: 0 | Status: COMPLETED | OUTPATIENT
Start: 2021-06-07 | End: 2021-06-07

## 2021-06-07 RX ORDER — SODIUM CHLORIDE 9 MG/ML
1000 INJECTION, SOLUTION INTRAVENOUS
Refills: 0 | Status: DISCONTINUED | OUTPATIENT
Start: 2021-06-07 | End: 2021-06-07

## 2021-06-07 RX ORDER — METOPROLOL TARTRATE 50 MG
5 TABLET ORAL EVERY 6 HOURS
Refills: 0 | Status: DISCONTINUED | OUTPATIENT
Start: 2021-06-07 | End: 2021-06-08

## 2021-06-07 RX ORDER — DEXMEDETOMIDINE HYDROCHLORIDE IN 0.9% SODIUM CHLORIDE 4 UG/ML
0.2 INJECTION INTRAVENOUS
Qty: 400 | Refills: 0 | Status: DISCONTINUED | OUTPATIENT
Start: 2021-06-07 | End: 2021-06-09

## 2021-06-07 RX ADMIN — Medication 1 MILLIGRAM(S): at 05:21

## 2021-06-07 RX ADMIN — FENTANYL CITRATE 2.91 MICROGRAM(S)/KG/HR: 50 INJECTION INTRAVENOUS at 07:51

## 2021-06-07 RX ADMIN — Medication 50 GRAM(S): at 02:23

## 2021-06-07 RX ADMIN — FENTANYL CITRATE 2.91 MICROGRAM(S)/KG/HR: 50 INJECTION INTRAVENOUS at 19:26

## 2021-06-07 RX ADMIN — PROPOFOL 6.97 MICROGRAM(S)/KG/MIN: 10 INJECTION, EMULSION INTRAVENOUS at 07:55

## 2021-06-07 RX ADMIN — Medication 1 MILLIGRAM(S): at 01:31

## 2021-06-07 RX ADMIN — ENOXAPARIN SODIUM 40 MILLIGRAM(S): 100 INJECTION SUBCUTANEOUS at 11:48

## 2021-06-07 RX ADMIN — CHLORHEXIDINE GLUCONATE 1 APPLICATION(S): 213 SOLUTION TOPICAL at 12:06

## 2021-06-07 RX ADMIN — Medication 5 MILLIGRAM(S): at 23:11

## 2021-06-07 RX ADMIN — SODIUM CHLORIDE 75 MILLILITER(S): 9 INJECTION, SOLUTION INTRAVENOUS at 19:26

## 2021-06-07 RX ADMIN — Medication 5 MILLIGRAM(S): at 17:39

## 2021-06-07 RX ADMIN — FENTANYL CITRATE 2.91 MICROGRAM(S)/KG/HR: 50 INJECTION INTRAVENOUS at 03:16

## 2021-06-07 RX ADMIN — LEVETIRACETAM 400 MILLIGRAM(S): 250 TABLET, FILM COATED ORAL at 05:20

## 2021-06-07 RX ADMIN — CHLORHEXIDINE GLUCONATE 15 MILLILITER(S): 213 SOLUTION TOPICAL at 17:39

## 2021-06-07 RX ADMIN — PANTOPRAZOLE SODIUM 40 MILLIGRAM(S): 20 TABLET, DELAYED RELEASE ORAL at 11:49

## 2021-06-07 RX ADMIN — DEXMEDETOMIDINE HYDROCHLORIDE IN 0.9% SODIUM CHLORIDE 2.91 MICROGRAM(S)/KG/HR: 4 INJECTION INTRAVENOUS at 07:52

## 2021-06-07 RX ADMIN — SODIUM CHLORIDE 75 MILLILITER(S): 9 INJECTION, SOLUTION INTRAVENOUS at 07:55

## 2021-06-07 RX ADMIN — PROPOFOL 6.97 MICROGRAM(S)/KG/MIN: 10 INJECTION, EMULSION INTRAVENOUS at 19:26

## 2021-06-07 RX ADMIN — DEXMEDETOMIDINE HYDROCHLORIDE IN 0.9% SODIUM CHLORIDE 2.91 MICROGRAM(S)/KG/HR: 4 INJECTION INTRAVENOUS at 19:25

## 2021-06-07 RX ADMIN — CHLORHEXIDINE GLUCONATE 15 MILLILITER(S): 213 SOLUTION TOPICAL at 05:20

## 2021-06-07 RX ADMIN — LEVETIRACETAM 400 MILLIGRAM(S): 250 TABLET, FILM COATED ORAL at 18:30

## 2021-06-07 RX ADMIN — Medication 5 MILLIGRAM(S): at 11:49

## 2021-06-07 NOTE — PROGRESS NOTE ADULT - ASSESSMENT
ASSESSMENT:  41M s/p radical prostatectomy w pelvic LN dissection, c/b intraop hemorrhage, now w concern for seizure v. agitation v. ETOH withdrawal.     PLAN:    Neurologic: h/o seizures  - Sedated on propofol, fentanyl and precedex gtt, will wean propofol, then fentanyl titrate to RASS -1  - Ativan PRN agitation  - Keppra 1000 mg BID  - Neurology following  - Video EEG, preliminary no seizure activity  - CTA head/neck negative     Respiratory:  - Intubated Volume A/C: 14/440/5/30%  - Monitor ABGs    Cardiovascular: h/o HTN  - C/w meoprolol 5mg q6hr    Gastrointestinal/Nutrition:  - NPO for now, will consider start feeds tomorrow    Renal/Genitourinary: s/p radical prostatectomy  - Follow I/Os  - Trend CR, electrolytes  - Maintenance fluids D5 1/2NS at 75cc/hr    Hematologic: no acute issues  - Trend CBC  - DVT ppx: Lovenox    Infectious Disease  - Trend WBC count, fever curve    Tubes/Lines/Drains:  -L radial a-line  -PIVs  -White catheter  -L PANCHO     Endocrine: No acute issues  -Monitor serum glucose on BMP    Disposition: SICU

## 2021-06-07 NOTE — PROGRESS NOTE ADULT - ASSESSMENT
42 yo male with recently diagnosed prostate cancer admitted for elective prostatectomy w/ operative course c/b 5 units blood loss, s/p 12 units pRBC, 7 units FFP, 3 units platelets, 14L crystalloids, and 1L albumin, admitted to the SICU intubated and for hemorrhage watch. Post operatively, when weaning sedation patient had UE shaking and then whole body shaking concerning for EEG. Loaded with Keppra and started on 500mg BID. EEG with slowing, no electrographic correlate with push button events. CTH/A negative. Exam limited due to sedation.     Recommendations  - continue vEEG for now  - wean sedation as tolerated  - can continue Keppra 1g BID IV for now  - MRI brain w/o contrast once stable          42 yo male with recently diagnosed prostate cancer admitted for elective prostatectomy w/ operative course c/b 5 units blood loss, s/p 12 units pRBC, 7 units FFP, 3 units platelets, 14L crystalloids, and 1L albumin, admitted to the SICU intubated and for hemorrhage watch. Post operatively, when weaning sedation patient had UE shaking and then whole body shaking concerning for seizure. Loaded with Keppra and started on 500mg BID. EEG with slowing, no electrographic correlate with push button events. CTH/A negative. Exam limited due to sedation.     Recommendations  - continue vEEG for now  - wean sedation as tolerated  - can continue Keppra 1g BID IV for now  - MRI brain w/o contrast once stable

## 2021-06-07 NOTE — PROGRESS NOTE ADULT - ATTENDING COMMENTS
I agree with the history, physical, and plan, which I have reviewed and edited where appropriate.  I agree with notes/assessment and detailed interval history of the health care providers on my service.  The patient is in SICU with diagnosis mentioned in the note.    The patient is a critical care patient with life threatening hemodynamic and metabolic instability in SICU.  The SICU team has a constant risk benefit analyzes discussion and coordinating care with the primary team, all consultants, House Staff and PA's..  I was physically present for the key portions of the evaluation and management (E/M) service provided.    The documentation of the total time spent 62 minutes  41y Male w/ recently diagnosed prostate cancer s/p radical prostatectomy.  Operative course c/b 5 units blood loss, s/p 12 units pRBC, 7 units FFP, 3 units platelets, 14L crystalloids, and 1L albumin, admitted to the SICU intubated and for hemorrhage watch but is stable, now with change in mental status thought to be seizures vs withdrawal on EEG, neurology on board  I have personally examined the patient, reviewed data and laboratory tests/x-rays and all pertinent electronic images.  NEUROLOGY  Exam: Intubated and sedated, moves all extremities    HEENT  Exam: Normocephalic, atraumatic    RESPIRATORY  Exam:   Mechanical Ventilation: Mode: AC/ CMV   CARDIOVASCULAR  Exam: regular rhythm  GI/NUTRITION  Exam: Abdomen soft, Non-tender, Non-distended  VASCULAR  Exam: Extremities warm,    The plan is specified below:    #Neurologic:  -Intubated and sedated; Volume A/C: 14/440/5/30%  -Sedated on propofol and versed gtt, will wean propofol off  -Ativan PRN agitation  -Keppra 500mg BID for hx of seizure disorder  Respiratory:  -Intubated Volume A/C: 14/440/5/30%  Cardiovascular  - metoprolol   Gastrointestinal/Nutrition:  -NPO  -Consider enteral access when seizure activity is better controlled  #Renal/Genitourinary  -S/p radical prostatectomy  -Maintenance fluids D5 1/2NS at 75cc/hr  Hematologic:   -Started on lovenox for DVT ppx  Infectious Disease  -Trend WBC count, fever curve    Endocrine: No acute issues  -Monitor serum glucose on BMP    Disposition: SICU

## 2021-06-07 NOTE — PROGRESS NOTE ADULT - ATTENDING COMMENTS
I agree with the history, physical, and plan, which I have reviewed and edited where appropriate.  I agree with notes/assessment of health care providers on my service.  The patient is in SICU with diagnosis mentioned in the note.    The patient is a critical care patient with life threatening hemodynamic and metabolic instability in SICU.  Risk benefit analyzes discussed.  I was physically present for the key portions of the evaluation and management (E/M) service provided.  Staff and PA's.  The documentation of the total time spent 55 minutes.  41M s/p radical prostatectomy w pelvic LN dissection, c/b intraop hemorrhage, with concern for ETOH withdrawal.   I have personally examined the patient, reviewed data and laboratory tests/x-rays and all pertinent electronic images.  NEURO  - Intermittently follows commands  - Opens eyes    RESPIRATORY  RR: 14   SpO2: 98%   Mechanical Ventilation: Mode: AC/ CMV  CARDIOVASCULAR  HR: 76   BP: 155/101   The plan is specified below:    Neurologic:   - Sedated on propofol, fentanyl and precedex gtt, will wean propofol, then fentanyl titrate to RASS -1  - Ativan PRN agitation  - Keppra 1000 mg BID     Respiratory:  - Intubated Volume A/C:     Cardiovascular:   - C/w meoprolol 5mg q6hr    Gastrointestinal/Nutrition:  - NPO for now, will consider start feeds tomorrow    Renal/Genitourinary:   - Maintenance fluids D5 1/2NS at 75cc/hr    Hematologic:   - DVT ppx: Lovenox    Infectious Disease  - Trend WBC count, fever curve    Endocrine: No acute issues  -Monitor serum glucose on BMP    Disposition: SICU

## 2021-06-07 NOTE — DIETITIAN INITIAL EVALUATION ADULT. - OTHER INFO
40 yo male presents to [facility] for preop evaluation for open radical retropubic prostatectomy, pelvic lymph node dissection.  Patient with elevated PSA levels s/p prostate biopsy and diagnosed with malignant neoplasm of prostate.  Patient reports increased urinary frequency, denies dysuria or hematuria.    Surgery Information  Open radical retropubic prostatectomy.  EBL: 6L   IV Fluids: 7L NS and 7L LR    Blood Products:  12 units pRBC, 7 FFP, 3 platelets  Complications: Acute blood loss anemia, hemorrhagic shock.  Reported hemostasis achieved, pt. was closed. 1U Cryo given in SICU and he was started on Prop, Fent, and Versed drips.

## 2021-06-07 NOTE — PROGRESS NOTE ADULT - ASSESSMENT
ASSESSMENT:  41y Male w/ recently diagnosed prostate cancer s/p radical prostatectomy.  Operative course c/b 5 units blood loss, s/p 12 units pRBC, 7 units FFP, 3 units platelets, 14L crystalloids, and 1L albumin, admitted to the SICU intubated and for hemorrhage watch.    PLAN:    Neurologic: several seizures in the morning responsive to ativan  -Intubated and sedated; Volume A/C: 16/440/5/30%  -Sedated on propofol and Precedex  -Ativan 1mg q4 added for ppx   -Keppra 1000mg BID for hx of seizure disorder  -Two episodes of seizure-like activity requiring push doses of ativan  -Neurology consulted, appreciating their recommendations  -Video EEG  -CTA head/neck negative     Respiratory:  -Intubated Volume A/C: 16/500/5/60%  -Monitor ABGs    Cardiovascular  -Hypertensive on metoprolol at home  -Holding anti-hypertensives for now    Gastrointestinal/Nutrition:  -NPO  -Consider enteral access when seizure activity is better controlled    Renal/Genitourinary  -S/p radical prostatectomy  -Follow I/Os  -Trend CR, electrolytes  -Maintenance fluids D5 1/2NS at 75cc/hr    Hematologic: no acute issues  -Trend CBC  -Started on lovenox for DVT ppx    Infectious Disease  -Ancef 2g q 8 hours (started on 6/4/2021)  -Trend WBC count, fever curve    Tubes/Lines/Drains:  -L radial a-line  -PIVs  -White catheter  -L PANCHO to pleuravac suction    Endocrine: No acute issues  -Monitor serum glucose on BMP    Disposition: SICU ASSESSMENT:  41y Male w/ recently diagnosed prostate cancer s/p radical prostatectomy.  Operative course c/b 5 units blood loss, s/p 12 units pRBC, 7 units FFP, 3 units platelets, 14L crystalloids, and 1L albumin, admitted to the SICU intubated and for hemorrhage watch but is stable, now with change in mental status thought to be seizures vs withdrawal on EEG, neurology on board    PLAN:    #Neurologic: several seizures in the morning responsive to ativan? On EEG   -Intubated and sedated; Volume A/C: 14/440/5/30%  -Sedated on propofol and versed gtt, will wean propofol off  -Ativan PRN agitation  -Keppra 500mg BID for hx of seizure disorder  -Neurology consulted, appreciating their recommendations  -Video EEG  -CTA head/neck negative     Respiratory:  -Intubated Volume A/C: 14/440/5/30%  -Monitor ABGs    Cardiovascular  -Hypertensive started home metoprolol 6/6    Gastrointestinal/Nutrition:  -NPO  -Consider enteral access when seizure activity is better controlled    #Renal/Genitourinary  -S/p radical prostatectomy  -Follow I/Os  -Trend CR, electrolytes  -Maintenance fluids D5 1/2NS at 75cc/hr    Hematologic: no acute issues  -Trend CBC  -Started on lovenox for DVT ppx    Infectious Disease  -Trend WBC count, fever curve    Tubes/Lines/Drains:  -L radial a-line  -PIVs  -White catheter  -L PANCHO     Endocrine: No acute issues  -Monitor serum glucose on BMP    Disposition: SICU

## 2021-06-07 NOTE — PROGRESS NOTE ADULT - ASSESSMENT
41y Male w/ recently diagnosed prostate cancer s/p radical prostatectomy.  Operative course c/b 5 units blood loss, s/p 12 units pRBC, 7 units FFP, 3 units platelets, 14L crystalloids, and 1L albumin, admitted to the SICU intubated and for hemorrhage watch.    6/4: Sicu for hemodynamic monitoring H/H stable   6/5: Seizures, Neuro consulted, CTA CTH  6/6: seizures today, EEG in progress  6/7: Continues to have seizure activity, EEG in progress    - Intubated and sedated, wean sedation/SBT per SICU  - NPO while intubated   - replete electrolytes prn   - Trend Cr and I/Os  - Trend H/H, Hct 35  - Continue lee catheter   - f/up neuro, EEG  - f/up CTH and CTA final read

## 2021-06-07 NOTE — PROGRESS NOTE ADULT - SUBJECTIVE AND OBJECTIVE BOX
SICU Daily Progress Note  =====================================================  Interval/Overnight Events:     -Two episodes of tonic clonic seizure while attempting to wean down versed, prop gtt (7 mins, then 14 mins) before day shift, had 4 seizures (11 minutes , 5 minutes, 3 minutes, 9 minutes), however epileptiform activity not seen on EEG.   - currently on propofol gtt, weaned off versed gtt  -Neurology consulted, keep on all sedation to obtain a video EEG, do not recommend addition of phenobarbitol or other antiepilieptics at this time  -Keppra increased to 1000 mg q12 hours  - Precedex started for agitation and seizures  [facility]    HPI:   40 yo male presents to [facility] for preop evaluation for open radical retropubic prostatectomy, pelvic lymph node dissection.  Patient with elevated PSA levels s/p prostate biopsy and diagnosed with malignant neoplasm of prostate.  Patient reports increased urinary frequency, denies dysuria or hematuria.    Surgery Information  Open radical retropubic prostatectomy.  EBL: 6L   IV Fluids: 7L NS and 7L LR    Blood Products:  12 units pRBC, 7 FFP, 3 platelets  Complications: Acute blood loss anemia, hemorrhagic shock.  Reported hemostasis achieved, pt. was closed. 1U Cryo given in SICU and he was started on Prop, Fent, and Versed drips.      Allergies: No Known Allergies      MEDICATIONS:   --------------------------------------------------------------------------------------  Neurologic Medications  dexMEDEtomidine Infusion 0.2 MICROgram(s)/kG/Hr IV Continuous <Continuous>  fentaNYL   Infusion 0.5 MICROgram(s)/kG/Hr IV Continuous <Continuous>  levETIRAcetam  IVPB 1000 milliGRAM(s) IV Intermittent every 12 hours  LORazepam   Injectable 1 milliGRAM(s) IV Push every 4 hours  propofol Infusion 20 MICROgram(s)/kG/Min IV Continuous <Continuous>    Respiratory Medications    Cardiovascular Medications    Gastrointestinal Medications  dextrose 5% + sodium chloride 0.45% 1000 milliLiter(s) IV Continuous <Continuous>  pantoprazole  Injectable 40 milliGRAM(s) IV Push daily    Genitourinary Medications    Hematologic/Oncologic Medications  enoxaparin Injectable 40 milliGRAM(s) SubCutaneous daily    Antimicrobial/Immunologic Medications    Endocrine/Metabolic Medications    Topical/Other Medications  chlorhexidine 0.12% Liquid 15 milliLiter(s) Oral Mucosa every 12 hours  chlorhexidine 4% Liquid 1 Application(s) Topical daily    --------------------------------------------------------------------------------------    VITAL SIGNS, INS/OUTS (last 24 hours):  --------------------------------------------------------------------------------------  ICU Vital Signs Last 24 Hrs  T(C): 37.1 (07 Jun 2021 00:00), Max: 37.8 (06 Jun 2021 16:00)  T(F): 98.8 (07 Jun 2021 00:00), Max: 100 (06 Jun 2021 16:00)  HR: 84 (07 Jun 2021 01:00) (84 - 189)  BP: 112/75 (06 Jun 2021 19:00) (90/63 - 120/88)  BP(mean): 88 (06 Jun 2021 19:00) (72 - 102)  ABP: 121/96 (07 Jun 2021 00:00) (88/46 - 193/93)  ABP(mean): 106 (07 Jun 2021 00:00) (60 - 142)  RR: 14 (07 Jun 2021 01:00) (14 - 25)  SpO2: 100% (07 Jun 2021 01:00) (88% - 100%)  --------------------------------------------------------------------------------------    EXAM  NEUROLOGY  Exam: Intubated and sedated, moves all extremities    HEENT  Exam: Normocephalic, atraumatic    RESPIRATORY  Exam: Intubated and sedated  Mechanical Ventilation: Mode: AC/ CMV (Assist Control/ Continuous Mandatory Ventilation), RR (machine): 14, TV (machine): 440, FiO2: 30, PEEP: 5, ITime: 0.9, MAP: 10, PIP: 21    CARDIOVASCULAR  Exam: Tachycardic with regular rhythm    GI/NUTRITION  Exam: Abdomen soft, Non-tender, Non-distended  Current Diet:  NPO    VASCULAR  Exam: Extremities warm, pink, well-perfused    MUSCULOSKELETAL  Exam: All extremities moving spontaneously without limitations    SKIN  Exam: Good skin turgor    METABOLIC/FLUIDS/ELECTROLYTES  dextrose 5% + sodium chloride 0.45% 1000 milliLiter(s) IV Continuous <Continuous>      HEMATOLOGIC  [x] VTE Prophylaxis: enoxaparin Injectable 40 milliGRAM(s) SubCutaneous daily    Transfusions:	[] PRBC	[] Platelets		[] FFP	[] Cryoprecipitate    INFECTIOUS DISEASE  Antimicrobials/Immunologic Medications:    Tubes/Lines/Drains    [x] Peripheral IV  [] Central Venous Line     	[] R	[] L	[] IJ	[] Fem	[] SC	Date Placed:   [x] Arterial Line		[] R	[x] L	[] Fem	[x] Rad	[] Ax	Date Placed: 6/4  [] PICC		[] Midline		[] Mediport  [x] Urinary Catheter		Date Placed: 6/4  [x] Necessity of urinary, arterial, and venous catheters discussed    LABS  --------------------------------------------------------------------------------------                        11.9   8.65  )-----------( 133      ( 06 Jun 2021 01:26 )             35.8     06-06    136  |  103  |  5<L>  ----------------------------<  100<H>  3.7   |  24  |  0.51    Ca    8.1<L>      06 Jun 2021 01:26  Phos  3.3     06-06  Mg     1.7     06-06    --------------------------------------------------------------------------------------    OTHER LABORATORY:     IMAGING STUDIES:   CXR:     < from: Xray Chest 1 View- PORTABLE-Urgent (Xray Chest 1 View- PORTABLE-Urgent .) (06.06.21 @ 05:13) >  FINDINGS:    6/5/2021 at 11:26 PM: Endotracheal tube in place. Heart is mildly enlarged. Minimal elevation of the right hemidiaphragm. Right basilar atelectasis. Apices are unremarkable. Degenerative changes.    6/6/2021: Endotracheal tube in place. Elevation of the right hemidiaphragm. Minimal right basilar atelectasis. Apices the more unremarkable. Degenerative changes..        IMPRESSION:    Endotracheal tube in place. Minimal right basilar atelectasis.    < end of copied text >

## 2021-06-07 NOTE — PROGRESS NOTE ADULT - SUBJECTIVE AND OBJECTIVE BOX
SUBJECTIVE: Multiple push button events for concern of seizure, no electrographic correlate.     Vital Signs Last 24 Hrs  T(C): 35.7 (07 Jun 2021 08:00), Max: 37.8 (06 Jun 2021 16:00)  T(F): 96.3 (07 Jun 2021 08:00), Max: 100 (06 Jun 2021 16:00)  HR: 72 (07 Jun 2021 10:00) (68 - 159)  BP: 155/101 (07 Jun 2021 08:00) (106/75 - 155/101)  BP(mean): 115 (07 Jun 2021 08:00) (83 - 115)  RR: 14 (07 Jun 2021 10:00) (14 - 23)  SpO2: 97% (07 Jun 2021 10:00) (92% - 100%)    Neurological (>12):  MS: Intubated, sedated (propofol, precedex and fentanyl)   CNs: PERRLA 2mm bilaterally and reactive, no BTT bilaterally, no corneal, no oculocephalic, no gag. no facial asymmetry.   Motor: Flaccid tone throughout, no spontaneous movement noted.    Sensation: no withdrawl to noxious stimuli   Reflexes: mute throughout      LABORATORY:  CBC                       11.3   8.20  )-----------( 118      ( 07 Jun 2021 01:17 )             35.5     Chem 06-07    139  |  107  |  4<L>  ----------------------------<  102<H>  4.0   |  23  |  0.51    Ca    7.9<L>      07 Jun 2021 01:17  Phos  2.8     06-07  Mg     1.5     06-07      MEDICATIONS  (STANDING):  chlorhexidine 0.12% Liquid 15 milliLiter(s) Oral Mucosa every 12 hours  chlorhexidine 4% Liquid 1 Application(s) Topical daily  dexMEDEtomidine Infusion 0.2 MICROgram(s)/kG/Hr (2.91 mL/Hr) IV Continuous <Continuous>  dextrose 5% + sodium chloride 0.45% 1000 milliLiter(s) (75 mL/Hr) IV Continuous <Continuous>  enoxaparin Injectable 40 milliGRAM(s) SubCutaneous daily  fentaNYL   Infusion 0.5 MICROgram(s)/kG/Hr (2.91 mL/Hr) IV Continuous <Continuous>  levETIRAcetam  IVPB 1000 milliGRAM(s) IV Intermittent every 12 hours  metoprolol tartrate Injectable 5 milliGRAM(s) IV Push every 6 hours  pantoprazole  Injectable 40 milliGRAM(s) IV Push daily  propofol Infusion 20 MICROgram(s)/kG/Min (6.97 mL/Hr) IV Continuous <Continuous>    MEDICATIONS  (PRN):  LORazepam   Injectable 1 milliGRAM(s) IV Push every 4 hours PRN Agitation        EEG Summary / Classification:  Abnormal EEG in the sedated patient.  -moderate generalized slowing  -multiple push button events with no electrographic correlate.  -attenuation of the background noted in the bilateral posterior quadrants    EEG Impression / Clinical Correlate:  Findings are consistent with moderate diffuse nonspecific cerebral dysfunction  Multiple push button events with no electrographic correlate.  No seizures or epileptiform activities recorded. SUBJECTIVE: Multiple push button events for concern of seizure, no electrographic correlate.     Vital Signs Last 24 Hrs  T(C): 35.7 (07 Jun 2021 08:00), Max: 37.8 (06 Jun 2021 16:00)  T(F): 96.3 (07 Jun 2021 08:00), Max: 100 (06 Jun 2021 16:00)  HR: 72 (07 Jun 2021 10:00) (68 - 159)  BP: 155/101 (07 Jun 2021 08:00) (106/75 - 155/101)  BP(mean): 115 (07 Jun 2021 08:00) (83 - 115)  RR: 14 (07 Jun 2021 10:00) (14 - 23)  SpO2: 97% (07 Jun 2021 10:00) (92% - 100%)    Gen: NAD, intubated  Ext: no edema  skin: warm dry  Neurological (>12):  MS: Intubated, sedated (propofol, precedex and fentanyl)   CNs: PERRLA 2mm bilaterally and reactive, no BTT bilaterally, no corneal, no oculocephalic, no gag. no facial asymmetry.   Motor: Flaccid tone throughout, no spontaneous movement noted.    Sensation: no withdrawl to noxious stimuli   Reflexes: mute throughout      LABORATORY:  CBC                       11.3   8.20  )-----------( 118      ( 07 Jun 2021 01:17 )             35.5     Chem 06-07    139  |  107  |  4<L>  ----------------------------<  102<H>  4.0   |  23  |  0.51    Ca    7.9<L>      07 Jun 2021 01:17  Phos  2.8     06-07  Mg     1.5     06-07      MEDICATIONS  (STANDING):  chlorhexidine 0.12% Liquid 15 milliLiter(s) Oral Mucosa every 12 hours  chlorhexidine 4% Liquid 1 Application(s) Topical daily  dexMEDEtomidine Infusion 0.2 MICROgram(s)/kG/Hr (2.91 mL/Hr) IV Continuous <Continuous>  dextrose 5% + sodium chloride 0.45% 1000 milliLiter(s) (75 mL/Hr) IV Continuous <Continuous>  enoxaparin Injectable 40 milliGRAM(s) SubCutaneous daily  fentaNYL   Infusion 0.5 MICROgram(s)/kG/Hr (2.91 mL/Hr) IV Continuous <Continuous>  levETIRAcetam  IVPB 1000 milliGRAM(s) IV Intermittent every 12 hours  metoprolol tartrate Injectable 5 milliGRAM(s) IV Push every 6 hours  pantoprazole  Injectable 40 milliGRAM(s) IV Push daily  propofol Infusion 20 MICROgram(s)/kG/Min (6.97 mL/Hr) IV Continuous <Continuous>    MEDICATIONS  (PRN):  LORazepam   Injectable 1 milliGRAM(s) IV Push every 4 hours PRN Agitation        EEG Summary / Classification:  Abnormal EEG in the sedated patient.  -moderate generalized slowing  -multiple push button events with no electrographic correlate.  -attenuation of the background noted in the bilateral posterior quadrants    EEG Impression / Clinical Correlate:  Findings are consistent with moderate diffuse nonspecific cerebral dysfunction  Multiple push button events with no electrographic correlate.  No seizures or epileptiform activities recorded.

## 2021-06-07 NOTE — EEG REPORT - NS EEG TEXT BOX
[facility]  Comprehensive Epilepsy Center  Report of Continuous Video EEG    [facility]: [address], [place] [address], Ph#: [phone]  J:  [address] [place] [address], Ph#: [phone]  Office: [address] [place] [address] [address], [place] [address] Ph#: [phone]    Patient Name: KATIA MCCLELLAN    Age: 41 year, : 1980  Patient ID: -, MRN #: -, Haines: [address]  Referring Physician: NELIDA LIEBERMAN  EEG #: [id number]    Study Date: 2021   Start Time: 11:12:53 AM    End Date:          End Time: 08:00:04 AM     Study Duration: ~21 H    Study Information:    EEG Recording Technique:  The patient underwent continuous Video-EEG monitoring, using Telemetry System hardware on the XLTek Digital System. EEG and video data were stored on a computer hard drive with important events saved in digital archive files. The material was reviewed by a physician (electroencephalographer / epileptologist) on a daily basis. Osiel and seizure detection algorithms were utilized and reviewed. An EEG Technician attended to the patient, and was available throughout daytime work hours.  The epilepsy center neurologist was available in person or on call 24-hours per day.    EEG Placement and Labeling of Electrodes:  The EEG was performed utilizing 20 channel referential EEG connections (coronal over temporal over parasagittal montage) using all standard 10-20 electrode placements with EKG, with additional electrodes placed in the inferior temporal region using the modified 10-10 montage electrode placements for elective admissions, or if deemed necessary. Recording was at a sampling rate of 256 samples per second per channel. Time synchronized digital video recording was done simultaneously with EEG recording. A low light infrared camera was used for low light recording.     History:   VEEG AT BEDSIDE SICU-05  41 YEAR OLD MALE   COR: INTUBATED/VENT  P/W: MALIGNANT NEOPLASM  PMH: HTN, ASTHMA, SZ, PROSTATE CA  HV: NOT COMPLETED DUE TO PANDEMIC  PHOTIC: NOT COMPLETED  A&OX 0  CONCERN FOR SEIZURES    Medication  Dilantin (Phenytoin)  Sublamize  Versed  PROTONIX  Keppra (Levetiracetam)  Potassium  Magnesium  Sodium Chloride  PERIDEX  Hibiclens  CEFAZOLIN  Lovenox    Interpretation:    [Abbreviation Key:  PDR=alpha rhythm/posterior dominant rhythm. A-P=anterior posterior gradient.  Amplitude: ‘very low’:<20; ‘low’:20-50; ‘medium’:; ‘high’:>200uV.  Persistence for periodic/rhythmic patterns (% of epoch) ‘rare’:<1%; ‘occasional’:1-10%; ‘frequent’:10-50%; ‘abundant’:50-90%; ‘continuous’:>90%.  Persistence for sporadic discharges: ‘rare’:<1/hr; ‘occasional’:1/min-1/hr; ‘frequent’:>1/min; ‘abundant’:>1/10 sec.  GRDA=generalized rhythmic delta activity, LRDA=lateralized rhythmic delta activity, TIRDA=temporal intermittent rhythmic delta activity, FIRDA=frontal intermittent rhythmic activity. LPD=PLED=lateralized periodic discharges, GPD=generalized periodic discharges, BiPDs=BiPLEDs=bilateral independent periodic epileptiform discharges, SIRPID=stimulus induced rhythmic, periodic, or ictal appearing discharges.  Modifiers: +F=with fast component, +S=with spike component, +R=with rhythmic component.  S-B=burst suppression pattern.  PFA: paroxysmal fast activity. Max=maximal. N1-drowsy, N2-stage II sleep, N3-slow wave sleep.  HV=hyperventilation, PS=photic stimulation]    21-  Startin2021    Daily EEG Visual Analysis  FINDINGS: The background was continuous, spontaneously variable and reactive. During wakefulness, the posterior dominant rhythm was not recorded. There was attenuation of the background noted in the bilateral posterior quadrants.     Background Slowing:  Generalized slowing: intermittent polymorphic theta/delta activity  Focal Slowing: None was present.    Sleep Background:  Drowsiness was characterized by fragmentation, attenuation, and slowing of the background activity.    Stage II sleep transients were not recorded.    Other Non-Epileptiform Findings:  None were present.    Interictal Epileptiform Activity:   None were present.    Events:  Clinical events: Multiple push button events:  13:30:00 – no video was recorded. No electrographic seizure was recorded  13:36:18 – patient is awake and sitting up in bed, becomes tachycardic, and has an intermittent low amplitude tremor in his left hand. No electrographic seizures recorded.  16:10:53 – patient has low amplitude bilateral asynchronous hand tremor, head turned to the right, with no electrographic correlate.  21:14:17 – Episode of left hand shaking that lasting for a few seconds. No electrographic correlate  21:18 – begins gagging, requiring suction. No electrographic correlate.  21:25:48 – at 21:24:48, patient develops bilateral tonic like activity of the upper extremities lasting 2-3 seconds. No electrographic correlate.  21:31 – unclear reason for push button event. No electrographic correlate  01:47 - unclear reason for push button event. No electrographic correlate  Seizures: None recorded.    Activation Procedures:   Hyperventilation was not performed.    Photic stimulation was not performed.    Artifacts:  Intermittent myogenic and movement artifacts were noted.    ECG:  The heart rate on single channel ECG was predominantly between 100-110 BPM.    EEG Summary / Classification:  Abnormal EEG in the sedated patient.  -moderate generalized slowing  -multiple push button events with no electrographic correlate.  -attenuation of the background noted in the bilateral posterior quadrants    EEG Impression / Clinical Correlate:  Findings are consistent with moderate diffuse nonspecific cerebral dysfunction  Multiple push button events with no electrographic correlate.  No seizures or epileptiform activities recorded.    Alvino Cohn MD  Neurology Resident    Preliminary Resident Report   [facility]  Comprehensive Epilepsy Center  Report of Continuous Video EEG    [facility]: [address], [place] [address], Ph#: [phone]  [facility]:  [address] [place] [address], Ph#: [phone]  Office: [address] [place] [address] [address], [place] [address] Ph#: [phone]    Patient Name: KATIA MCCLELLAN    Age: 41 year, : 1980  Patient ID: -, MRN #: -, [facility]aines[facility] [address]  Referring Physician: NELIDA LIEBERMAN  EEG #: [id number]    Study Date: 2021   Start Time: 11:12:53 AM    End Date:  21        End Time: 08:00:04 AM     Study Duration: ~21 H    Study Information:    EEG Recording Technique:  The patient underwent continuous Video-EEG monitoring, using Telemetry System hardware on the XLTek Digital System. EEG and video data were stored on a computer hard drive with important events saved in digital archive files. The material was reviewed by a physician (electroencephalographer / epileptologist) on a daily basis. Osiel and seizure detection algorithms were utilized and reviewed. An EEG Technician attended to the patient, and was available throughout daytime work hours.  The epilepsy center neurologist was available in person or on call 24-hours per day.    EEG Placement and Labeling of Electrodes:  The EEG was performed utilizing 20 channel referential EEG connections (coronal over temporal over parasagittal montage) using all standard 10-20 electrode placements with EKG, with additional electrodes placed in the inferior temporal region using the modified 10-10 montage electrode placements for elective admissions, or if deemed necessary. Recording was at a sampling rate of 256 samples per second per channel. Time synchronized digital video recording was done simultaneously with EEG recording. A low light infrared camera was used for low light recording.     History:   VEEG AT BEDSIDE SICU-05  41 YEAR OLD MALE   COR: INTUBATED/VENT  P/W: MALIGNANT NEOPLASM  PMH: HTN, ASTHMA, SZ, PROSTATE CA  HV: NOT COMPLETED DUE TO PANDEMIC  PHOTIC: NOT COMPLETED  A&OX 0  CONCERN FOR SEIZURES    Medication  Dilantin (Phenytoin)  Sublamize  Versed  PROTONIX  Keppra (Levetiracetam)  Potassium  Magnesium  Sodium Chloride  PERIDEX  Hibiclens  CEFAZOLIN  Lovenox    Interpretation:    [Abbreviation Key:  PDR=alpha rhythm/posterior dominant rhythm. A-P=anterior posterior gradient.  Amplitude: ‘very low’:<20; ‘low’:20-50; ‘medium’:; ‘high’:>200uV.  Persistence for periodic/rhythmic patterns (% of epoch) ‘rare’:<1%; ‘occasional’:1-10%; ‘frequent’:10-50%; ‘abundant’:50-90%; ‘continuous’:>90%.  Persistence for sporadic discharges: ‘rare’:<1/hr; ‘occasional’:1/min-1/hr; ‘frequent’:>1/min; ‘abundant’:>1/10 sec.  GRDA=generalized rhythmic delta activity, LRDA=lateralized rhythmic delta activity, TIRDA=temporal intermittent rhythmic delta activity, FIRDA=frontal intermittent rhythmic activity. LPD=PLED=lateralized periodic discharges, GPD=generalized periodic discharges, BiPDs=BiPLEDs=bilateral independent periodic epileptiform discharges, SIRPID=stimulus induced rhythmic, periodic, or ictal appearing discharges.  Modifiers: +F=with fast component, +S=with spike component, +R=with rhythmic component.  S-B=burst suppression pattern.  PFA: paroxysmal fast activity. Max=maximal. N1-drowsy, N2-stage II sleep, N3-slow wave sleep.  HV=hyperventilation, PS=photic stimulation]    Daily EEG Visual Analysis  FINDINGS: The background was continuous, spontaneously variable and reactive. During wakefulness, the posterior dominant rhythm was not well delineated with more diffuse 7hz activity 30uV.    Background Slowing:  Generalized slowing: intermittent polymorphic theta/delta activity  Focal Slowing: None was present.    Sleep Background:  Drowsiness was characterized by fragmentation, attenuation, and slowing of the background activity.    Stage II sleep transients were not recorded.    Other Non-Epileptiform Findings:  None were present.    Interictal Epileptiform Activity:   None were present.    Events:  Clinical events: Multiple push button events:  13:30:00 – no video was recorded. No electrographic seizure was recorded  13:36:18 – patient is awake and sitting up in bed, becomes tachycardic, and has an intermittent low amplitude tremor in his left hand. No electrographic seizures recorded.  16:10:53 – patient has low amplitude bilateral asynchronous hand tremor, head turned to the right, with no electrographic correlate.  21:14:17 – Episode of left hand shaking that lasting for a few seconds. No electrographic correlate  21:18 – begins gagging, requiring suction. No electrographic correlate.  21:25:48 – at 21:24:48, patient develops bilateral tonic like activity of the upper extremities lasting 2-3 seconds. No electrographic correlate.  21:31 – unclear reason for push button event. No electrographic correlate  01:47 - unclear reason for push button event. No electrographic correlate  Seizures: None recorded.    Activation Procedures:   Hyperventilation was not performed.    Photic stimulation was not performed.    Artifacts:  Intermittent myogenic and movement artifacts were noted.    ECG:  The heart rate on single channel ECG was predominantly between 100-110 BPM.    EEG Summary / Classification:  Abnormal EEG in the sedated patient.  -mild-moderate generalized slowing  -multiple push button events with no electrographic correlate.  -attenuation of the background noted in the bilateral posterior quadrants    EEG Impression / Clinical Correlate:  Findings are consistent with mild-moderate diffuse nonspecific cerebral dysfunction  Multiple push button events with no electrographic correlate.  No seizures or epileptiform activities recorded.    Alvino Cohn MD  Neurology Resident    MD SERGEY Palomo  Director, Continuous EEG Monitoring Program, [facility]   and Epilepsy Fellowship ,   Department of Neurology, [employer] [facility] [employer]    [facility] EEG Reading Room Ph#: [phone]  Epilepsy Answering Service after 5PM and before 8:30AM: Ph#: [phone]   [facility]  Comprehensive Epilepsy Center  Report of Continuous Video EEG    [facility]: [address], [place] [address], Ph#: [phone]  [facility]:  [address] [place] [address], Ph#: [phone]  Office: [address] [place] [address] [address], [place] [address] Ph#: [phone]    Patient Name: KATIA MCCLELLAN    Age: 41 year, : 1980  Patient ID: -, MRN #: -, [facility]aines: [address]  Referring Physician: NELIDA LIEBERMAN  EEG #: 21-    Study Date: 2021   Start Time: 11:12:53 AM    End Date:  21        End Time: 08:00:04 AM     Study Duration: ~21 H    Study Information:    EEG Recording Technique:  The patient underwent continuous Video-EEG monitoring, using Telemetry System hardware on the XLTek Digital System. EEG and video data were stored on a computer hard drive with important events saved in digital archive files. The material was reviewed by a physician (electroencephalographer / epileptologist) on a daily basis. Osiel and seizure detection algorithms were utilized and reviewed. An EEG Technician attended to the patient, and was available throughout daytime work hours.  The epilepsy center neurologist was available in person or on call 24-hours per day.    EEG Placement and Labeling of Electrodes:  The EEG was performed utilizing 20 channel referential EEG connections (coronal over temporal over parasagittal montage) using all standard 10-20 electrode placements with EKG, with additional electrodes placed in the inferior temporal region using the modified 10-10 montage electrode placements for elective admissions, or if deemed necessary. Recording was at a sampling rate of 256 samples per second per channel. Time synchronized digital video recording was done simultaneously with EEG recording. A low light infrared camera was used for low light recording.     History:   VEEG AT BEDSIDE SICU-05  41 YEAR OLD MALE   COR: INTUBATED/VENT  P/W: MALIGNANT NEOPLASM  PMH: HTN, ASTHMA, SZ, PROSTATE CA  HV: NOT COMPLETED DUE TO PANDEMIC  PHOTIC: NOT COMPLETED  A&OX 0  CONCERN FOR SEIZURES    Medication  Dilantin (Phenytoin)  Sublamize  Versed  PROTONIX  Keppra (Levetiracetam)  Potassium  Magnesium  Sodium Chloride  PERIDEX  Hibiclens  CEFAZOLIN  Lovenox    Interpretation:    [Abbreviation Key:  PDR=alpha rhythm/posterior dominant rhythm. A-P=anterior posterior gradient.  Amplitude: ‘very low’:<20; ‘low’:20-50; ‘medium’:; ‘high’:>200uV.  Persistence for periodic/rhythmic patterns (% of epoch) ‘rare’:<1%; ‘occasional’:1-10%; ‘frequent’:10-50%; ‘abundant’:50-90%; ‘continuous’:>90%.  Persistence for sporadic discharges: ‘rare’:<1/hr; ‘occasional’:1/min-1/hr; ‘frequent’:>1/min; ‘abundant’:>1/10 sec.  GRDA=generalized rhythmic delta activity, LRDA=lateralized rhythmic delta activity, TIRDA=temporal intermittent rhythmic delta activity, FIRDA=frontal intermittent rhythmic activity. LPD=PLED=lateralized periodic discharges, GPD=generalized periodic discharges, BiPDs=BiPLEDs=bilateral independent periodic epileptiform discharges, SIRPID=stimulus induced rhythmic, periodic, or ictal appearing discharges.  Modifiers: +F=with fast component, +S=with spike component, +R=with rhythmic component.  S-B=burst suppression pattern.  PFA: paroxysmal fast activity. Max=maximal. N1-drowsy, N2-stage II sleep, N3-slow wave sleep.  HV=hyperventilation, PS=photic stimulation]    Daily EEG Visual Analysis  FINDINGS: The background was continuous, spontaneously variable and reactive. During wakefulness, the posterior dominant rhythm was not well delineated with more diffuse 7hz activity 30uV.    Background Slowing:  Generalized slowing: intermittent polymorphic theta/delta activity  Focal Slowing: None was present.    Sleep Background:  Drowsiness was characterized by fragmentation, attenuation, and slowing of the background activity.    Stage II sleep transients were not recorded.    Other Non-Epileptiform Findings:  None were present.    Interictal Epileptiform Activity:   None were present.    Events:  Clinical events: Multiple push button events:  13:30:00 – no video was recorded. No electrographic seizure was recorded  13:36:18 – patient is awake and sitting up in bed, becomes tachycardic, and has an intermittent low amplitude tremor in his left hand. No electrographic seizures recorded.  16:10:53 – patient has low amplitude bilateral asynchronous hand tremor, head turned to the right, with no electrographic correlate.  21:14:17 – Episode of left hand shaking that lasting for a few seconds. No electrographic correlate  21:18 – begins gagging, requiring suction. No electrographic correlate.  21:25:48 – at 21:24:48, patient develops bilateral tonic like activity of the upper extremities lasting 2-3 seconds. No electrographic correlate.  21:31 – unclear reason for push button event. No electrographic correlate  01:47 - unclear reason for push button event. No electrographic correlate  Seizures: None recorded.    Activation Procedures:   Hyperventilation was not performed.    Photic stimulation was not performed.    Artifacts:  Intermittent myogenic and movement artifacts were noted.    ECG:  The heart rate on single channel ECG was predominantly between 100-110 BPM.    EEG Summary / Classification:  Abnormal EEG in the sedated patient.  -mild-moderate generalized slowing  -multiple push button events with no electrographic correlate.    EEG Impression / Clinical Correlate:  Findings are consistent with mild-moderate diffuse nonspecific cerebral dysfunction  Multiple push button events with no electrographic correlate.  No seizures or epileptiform activities recorded.    Alvino Cohn MD  Neurology Resident    Mitch River MD FAGINO  Director, Continuous EEG Monitoring Program, [facility]   and Epilepsy Fellowship ,   Department of Neurology, [facility] [employer]    [facility] EEG Reading Room Ph#: [phone]  Epilepsy Answering Service after 5PM and before 8:30AM: Ph#: [phone]

## 2021-06-07 NOTE — PROGRESS NOTE ADULT - SUBJECTIVE AND OBJECTIVE BOX
Interval events:   More seizure activity last night. Video EEG. Ancef dc'd.     SUBJECTIVE:  Currently sedated on fentanyl, precedex and propofol.       OBJECTIVE:  Vital Signs Last 24 Hrs  T(C): 36.6 (07 Jun 2021 04:00), Max: 37.8 (06 Jun 2021 16:00)  T(F): 97.8 (07 Jun 2021 04:00), Max: 100 (06 Jun 2021 16:00)  HR: 71 (07 Jun 2021 07:00) (68 - 160)  BP: 112/75 (06 Jun 2021 19:00) (90/63 - 120/88)  BP(mean): 88 (06 Jun 2021 19:00) (72 - 102)  RR: 23 (07 Jun 2021 07:00) (14 - 23)  SpO2: 98% (07 Jun 2021 07:00) (92% - 100%)    Physical Examination:  GEN: sedated  PULM: intubated   ABD: soft, nontender, nondistended, incision c/d/i, PACNHO SS  : lee secured, clear urine       LABS:                        11.3   8.20  )-----------( 118      ( 07 Jun 2021 01:17 )             35.5       06-07    139  |  107  |  4<L>  ----------------------------<  102<H>  4.0   |  23  |  0.51    Ca    7.9<L>      07 Jun 2021 01:17  Phos  2.8     06-07  Mg     1.5     06-07

## 2021-06-07 NOTE — CHART NOTE - NSCHARTNOTEFT_GEN_A_CORE
EEG Fellow's prelim read.   EEG reviewed until 4 pm. No seizure seen.   Official result to be followed in am

## 2021-06-07 NOTE — PROGRESS NOTE ADULT - SUBJECTIVE AND OBJECTIVE BOX
SURGICAL INTENSIVE CARE UNIT AFTERNOON PROGRESS NOTE    HPI: 42 yo male presents to [facility] for preop evaluation for open radical retropubic prostatectomy, pelvic lymph node dissection.  Patient  with elevated PSA levels s/p prostate biopsy and diagnosed with malignant neoplasm of prostate.  Patient reports increased urinary frequency, denies dysuria or hematuria.     NEURO  - Intermittently follows commands  - Opens eyes    RESPIRATORY  RR: 14 (06-07-21 @ 15:00) (14 - 23)  SpO2: 98% (06-07-21 @ 15:00) (93% - 100%)  Mechanical Ventilation: Mode: AC/ CMV (Assist Control/ Continuous Mandatory Ventilation), RR (machine): 14, RR (patient): 14, TV (machine): 440, FiO2: 30, PEEP: 5, ITime: 0.88, MAP: 8, PIP: 18  ABG - ( 07 Jun 2021 01:17 )  pH: 7.37  /  pCO2: 44    /  pO2: 97    / HCO3: 24    / Base Excess: x     /  SaO2: 97.4    Lactate: x          CARDIOVASCULAR  HR: 76 (06-07-21 @ 15:00) (68 - 159)  BP: 155/101 (06-07-21 @ 08:00) (107/74 - 155/101)  BP(mean): 115 (06-07-21 @ 08:00) (85 - 115)  ABP: 143/87 (06-07-21 @ 15:00) (88/58 - 192/112)  ABP(mean): 108 (06-07-21 @ 15:00) (69 - 142)    GI/NUTRITION  Diet: NPO   MANUEL placed     GENITOURINARY  I&O's Detail    06-06 @ 07:01  -  06-07 @ 07:00  --------------------------------------------------------  IN:    Dexmedetomidine: 94.6 mL    dextrose 5% + sodium chloride 0.45% w/ Additives: 1800 mL    FentaNYL: 278.4 mL    IV PiggyBack: 350 mL    Lactated Ringers Bolus: 1000 mL    Midazolam: 8.7 mL    Propofol: 187.5 mL  Total IN: 3719.2 mL    OUT:    Bulb (mL): 25 mL    Indwelling Catheter - Urethral (mL): 4775 mL  Total OUT: 4800 mL    Total NET: -1080.8 mL      06-07 @ 07:01  -  06-07 @ 15:06  --------------------------------------------------------  IN:    Dexmedetomidine: 101.5 mL    dextrose 5% + sodium chloride 0.45% w/ Additives: 525 mL    FentaNYL: 81.2 mL    Propofol: 74.6 mL  Total IN: 782.3 mL    OUT:    Indwelling Catheter - Urethral (mL): 2170 mL  Total OUT: 2170 mL    Total NET: -1387.7 mL      06-07    139  |  107  |  4<L>  ----------------------------<  102<H>  4.0   |  23  |  0.51    Ca    7.9<L>      07 Jun 2021 01:17  Phos  2.8     06-07  Mg     1.5     06-07      HEMATOLOGIC                        11.3   8.20  )-----------( 118      ( 07 Jun 2021 01:17 )             35.5

## 2021-06-07 NOTE — DIETITIAN INITIAL EVALUATION ADULT. - ORAL INTAKE PTA/DIET HISTORY
Pt intubated/sedated-unable to obtain nutrition hx at this time.  Spoke w/RN-plan for feeding tube placement.  NPO x 3d. Extensive chart review conducted to obtain nutrition related information.  As per hx, pt on folic acid supplements PTA.  Hx of EtOH 1-2 drinks/d.

## 2021-06-07 NOTE — DIETITIAN INITIAL EVALUATION ADULT. - PHYSCIAL ASSESSMENT
limited Nutrition Focused Physical Exam 2/2 pt with cap on head, bridle and covered to neck underweight/debilitated

## 2021-06-07 NOTE — DIETITIAN INITIAL EVALUATION ADULT. - ENTERAL
when medically feasible, consider initiation of enteral nutrition support w/Jevity 1.2 @20mL/h and advance as tolerated to goal of 50mL/h to provide 1440kcal w/66gm protein and add 1 pack no carb prosource/d to increase protein intake to 81gms/d to meet current need.s

## 2021-06-07 NOTE — PROGRESS NOTE ADULT - ATTENDING COMMENTS
41 with recent prostatectomy complicated by blood loss requiring transfusion and episode of whole body shaking while being weaned down on sedation concerning for a seizure. Pt started on keppra for suspected status. 24 hour EEG negative for seizure events or epileptic activity.   As per SICU team, they will begin to wean down on sedation. Would continue on EEG during this process and monitor for any episodes of seizure-like activity. He does not have any known risk factors for seizure and it is possible that shaking was in setting of weaning of sedation.   Will continue to monitor. 41 with recent prostatectomy complicated by blood loss requiring transfusion and episode of whole body shaking while being weaned down on sedation concerning for a seizure. Pt started on keppra for suspected status. 24 hour EEG negative for seizure events or epileptic activity.   As per SICU team, they will begin to wean down on sedation. Would continue on EEG during this process and monitor for any episodes of seizure-like activity. He does not have any known risk factors for seizure and it is possible that shaking was in setting of weaning of sedation. Could also have been a blood transfusion reaction.   Will continue to monitor.

## 2021-06-08 LAB
ANION GAP SERPL CALC-SCNC: 11 MMOL/L — SIGNIFICANT CHANGE UP (ref 7–14)
BASE EXCESS BLDA CALC-SCNC: -0.7 MMOL/L — SIGNIFICANT CHANGE UP (ref -2–2)
BASE EXCESS BLDA CALC-SCNC: -0.8 MMOL/L — SIGNIFICANT CHANGE UP (ref -2–2)
BLOOD GAS ARTERIAL COMPREHENSIVE RESULT: SIGNIFICANT CHANGE UP
BUN SERPL-MCNC: 4 MG/DL — LOW (ref 7–23)
CALCIUM SERPL-MCNC: 8.2 MG/DL — LOW (ref 8.4–10.5)
CHLORIDE SERPL-SCNC: 106 MMOL/L — SIGNIFICANT CHANGE UP (ref 98–107)
CO2 SERPL-SCNC: 20 MMOL/L — LOW (ref 22–31)
CREAT SERPL-MCNC: 0.4 MG/DL — LOW (ref 0.5–1.3)
GLUCOSE SERPL-MCNC: 111 MG/DL — HIGH (ref 70–99)
HCO3 BLDA-SCNC: 24 MMOL/L — SIGNIFICANT CHANGE UP (ref 22–26)
HCO3 BLDA-SCNC: 24 MMOL/L — SIGNIFICANT CHANGE UP (ref 22–26)
HCT VFR BLD CALC: 38.2 % — LOW (ref 39–50)
HGB BLD-MCNC: 12.3 G/DL — LOW (ref 13–17)
MAGNESIUM SERPL-MCNC: 1.3 MG/DL — LOW (ref 1.6–2.6)
MCHC RBC-ENTMCNC: 28.3 PG — SIGNIFICANT CHANGE UP (ref 27–34)
MCHC RBC-ENTMCNC: 32.2 GM/DL — SIGNIFICANT CHANGE UP (ref 32–36)
MCV RBC AUTO: 88 FL — SIGNIFICANT CHANGE UP (ref 80–100)
NRBC # BLD: 0 /100 WBCS — SIGNIFICANT CHANGE UP
NRBC # FLD: 0 K/UL — SIGNIFICANT CHANGE UP
PCO2 BLDA: 37 MMHG — SIGNIFICANT CHANGE UP (ref 35–48)
PCO2 BLDA: 42 MMHG — SIGNIFICANT CHANGE UP (ref 35–48)
PH BLD: 7.37 — SIGNIFICANT CHANGE UP (ref 7.35–7.45)
PH BLDA: 7.42 — SIGNIFICANT CHANGE UP (ref 7.35–7.45)
PHOSPHATE SERPL-MCNC: 3.1 MG/DL — SIGNIFICANT CHANGE UP (ref 2.5–4.5)
PLATELET # BLD AUTO: 133 K/UL — LOW (ref 150–400)
PO2 BLDA: 138 MMHG — HIGH (ref 83–108)
PO2 BLDA: 83 MMHG — SIGNIFICANT CHANGE UP (ref 83–108)
POTASSIUM SERPL-MCNC: 4 MMOL/L — SIGNIFICANT CHANGE UP (ref 3.5–5.3)
POTASSIUM SERPL-SCNC: 4 MMOL/L — SIGNIFICANT CHANGE UP (ref 3.5–5.3)
RBC # BLD: 4.34 M/UL — SIGNIFICANT CHANGE UP (ref 4.2–5.8)
RBC # FLD: 15.1 % — HIGH (ref 10.3–14.5)
SAO2 % BLDA: 96.6 % — SIGNIFICANT CHANGE UP (ref 95–99)
SAO2 % BLDA: 98.9 % — SIGNIFICANT CHANGE UP (ref 95–99)
SODIUM SERPL-SCNC: 137 MMOL/L — SIGNIFICANT CHANGE UP (ref 135–145)
WBC # BLD: 8.49 K/UL — SIGNIFICANT CHANGE UP (ref 3.8–10.5)
WBC # FLD AUTO: 8.49 K/UL — SIGNIFICANT CHANGE UP (ref 3.8–10.5)

## 2021-06-08 PROCEDURE — 99233 SBSQ HOSP IP/OBS HIGH 50: CPT | Mod: GC

## 2021-06-08 PROCEDURE — 95720 EEG PHY/QHP EA INCR W/VEEG: CPT

## 2021-06-08 PROCEDURE — 99291 CRITICAL CARE FIRST HOUR: CPT

## 2021-06-08 PROCEDURE — 71045 X-RAY EXAM CHEST 1 VIEW: CPT | Mod: 26,76

## 2021-06-08 PROCEDURE — 76604 US EXAM CHEST: CPT | Mod: 26

## 2021-06-08 RX ORDER — METOPROLOL TARTRATE 50 MG
5 TABLET ORAL EVERY 6 HOURS
Refills: 0 | Status: DISCONTINUED | OUTPATIENT
Start: 2021-06-08 | End: 2021-06-08

## 2021-06-08 RX ORDER — ACETAMINOPHEN 500 MG
1000 TABLET ORAL EVERY 6 HOURS
Refills: 0 | Status: COMPLETED | OUTPATIENT
Start: 2021-06-08 | End: 2021-06-09

## 2021-06-08 RX ORDER — MAGNESIUM SULFATE 500 MG/ML
2 VIAL (ML) INJECTION ONCE
Refills: 0 | Status: COMPLETED | OUTPATIENT
Start: 2021-06-08 | End: 2021-06-08

## 2021-06-08 RX ORDER — ACETAMINOPHEN 500 MG
975 TABLET ORAL EVERY 6 HOURS
Refills: 0 | Status: DISCONTINUED | OUTPATIENT
Start: 2021-06-08 | End: 2021-06-08

## 2021-06-08 RX ORDER — METOPROLOL TARTRATE 50 MG
5 TABLET ORAL EVERY 6 HOURS
Refills: 0 | Status: DISCONTINUED | OUTPATIENT
Start: 2021-06-08 | End: 2021-06-09

## 2021-06-08 RX ORDER — CHLORHEXIDINE GLUCONATE 213 G/1000ML
15 SOLUTION TOPICAL EVERY 12 HOURS
Refills: 0 | Status: DISCONTINUED | OUTPATIENT
Start: 2021-06-08 | End: 2021-06-08

## 2021-06-08 RX ORDER — ACETAMINOPHEN 500 MG
1000 TABLET ORAL ONCE
Refills: 0 | Status: COMPLETED | OUTPATIENT
Start: 2021-06-08 | End: 2021-06-08

## 2021-06-08 RX ORDER — CHLORHEXIDINE GLUCONATE 213 G/1000ML
15 SOLUTION TOPICAL EVERY 12 HOURS
Refills: 0 | Status: DISCONTINUED | OUTPATIENT
Start: 2021-06-08 | End: 2021-06-09

## 2021-06-08 RX ORDER — FUROSEMIDE 40 MG
40 TABLET ORAL ONCE
Refills: 0 | Status: COMPLETED | OUTPATIENT
Start: 2021-06-08 | End: 2021-06-08

## 2021-06-08 RX ORDER — METOPROLOL TARTRATE 50 MG
25 TABLET ORAL
Refills: 0 | Status: DISCONTINUED | OUTPATIENT
Start: 2021-06-08 | End: 2021-06-08

## 2021-06-08 RX ORDER — PROPOFOL 10 MG/ML
20 INJECTION, EMULSION INTRAVENOUS
Qty: 1000 | Refills: 0 | Status: DISCONTINUED | OUTPATIENT
Start: 2021-06-08 | End: 2021-06-09

## 2021-06-08 RX ORDER — HYDROMORPHONE HYDROCHLORIDE 2 MG/ML
1 INJECTION INTRAMUSCULAR; INTRAVENOUS; SUBCUTANEOUS ONCE
Refills: 0 | Status: DISCONTINUED | OUTPATIENT
Start: 2021-06-08 | End: 2021-06-08

## 2021-06-08 RX ADMIN — Medication 975 MILLIGRAM(S): at 12:18

## 2021-06-08 RX ADMIN — Medication 400 MILLIGRAM(S): at 20:51

## 2021-06-08 RX ADMIN — FENTANYL CITRATE 2.91 MICROGRAM(S)/KG/HR: 50 INJECTION INTRAVENOUS at 01:01

## 2021-06-08 RX ADMIN — Medication 400 MILLIGRAM(S): at 04:34

## 2021-06-08 RX ADMIN — ENOXAPARIN SODIUM 40 MILLIGRAM(S): 100 INJECTION SUBCUTANEOUS at 11:33

## 2021-06-08 RX ADMIN — PANTOPRAZOLE SODIUM 40 MILLIGRAM(S): 20 TABLET, DELAYED RELEASE ORAL at 11:33

## 2021-06-08 RX ADMIN — Medication 50 GRAM(S): at 05:02

## 2021-06-08 RX ADMIN — DEXMEDETOMIDINE HYDROCHLORIDE IN 0.9% SODIUM CHLORIDE 2.91 MICROGRAM(S)/KG/HR: 4 INJECTION INTRAVENOUS at 18:41

## 2021-06-08 RX ADMIN — CHLORHEXIDINE GLUCONATE 15 MILLILITER(S): 213 SOLUTION TOPICAL at 17:54

## 2021-06-08 RX ADMIN — Medication 1000 MILLIGRAM(S): at 21:21

## 2021-06-08 RX ADMIN — Medication 5 MILLIGRAM(S): at 04:21

## 2021-06-08 RX ADMIN — HYDROMORPHONE HYDROCHLORIDE 1 MILLIGRAM(S): 2 INJECTION INTRAMUSCULAR; INTRAVENOUS; SUBCUTANEOUS at 17:54

## 2021-06-08 RX ADMIN — LEVETIRACETAM 400 MILLIGRAM(S): 250 TABLET, FILM COATED ORAL at 17:58

## 2021-06-08 RX ADMIN — LEVETIRACETAM 400 MILLIGRAM(S): 250 TABLET, FILM COATED ORAL at 05:02

## 2021-06-08 RX ADMIN — DEXMEDETOMIDINE HYDROCHLORIDE IN 0.9% SODIUM CHLORIDE 2.91 MICROGRAM(S)/KG/HR: 4 INJECTION INTRAVENOUS at 16:15

## 2021-06-08 RX ADMIN — HYDROMORPHONE HYDROCHLORIDE 1 MILLIGRAM(S): 2 INJECTION INTRAMUSCULAR; INTRAVENOUS; SUBCUTANEOUS at 18:09

## 2021-06-08 RX ADMIN — Medication 50 GRAM(S): at 06:00

## 2021-06-08 RX ADMIN — Medication 1 MILLIGRAM(S): at 17:00

## 2021-06-08 RX ADMIN — FENTANYL CITRATE 2.91 MICROGRAM(S)/KG/HR: 50 INJECTION INTRAVENOUS at 18:42

## 2021-06-08 RX ADMIN — Medication 975 MILLIGRAM(S): at 12:48

## 2021-06-08 RX ADMIN — PROPOFOL 6.97 MICROGRAM(S)/KG/MIN: 10 INJECTION, EMULSION INTRAVENOUS at 18:42

## 2021-06-08 RX ADMIN — FENTANYL CITRATE 2.91 MICROGRAM(S)/KG/HR: 50 INJECTION INTRAVENOUS at 07:42

## 2021-06-08 RX ADMIN — Medication 5 MILLIGRAM(S): at 17:54

## 2021-06-08 RX ADMIN — Medication 1000 MILLIGRAM(S): at 05:04

## 2021-06-08 RX ADMIN — Medication 40 MILLIGRAM(S): at 08:50

## 2021-06-08 RX ADMIN — Medication 50 GRAM(S): at 02:53

## 2021-06-08 RX ADMIN — DEXMEDETOMIDINE HYDROCHLORIDE IN 0.9% SODIUM CHLORIDE 2.91 MICROGRAM(S)/KG/HR: 4 INJECTION INTRAVENOUS at 07:42

## 2021-06-08 RX ADMIN — CHLORHEXIDINE GLUCONATE 1 APPLICATION(S): 213 SOLUTION TOPICAL at 11:47

## 2021-06-08 RX ADMIN — CHLORHEXIDINE GLUCONATE 15 MILLILITER(S): 213 SOLUTION TOPICAL at 05:02

## 2021-06-08 NOTE — EEG REPORT - NS EEG TEXT BOX
[facility]  Comprehensive Epilepsy Center  Report of Continuous Video EEG    [facility]: [address], [place] [address], Ph#: [phone]  J: [address] [place] [id number], Ph#: [phone]  Office: [address] [place] [address] [address], [place] [address] Ph#: [phone]    Patient Name: KATIA MCCLELLAN    Age: 41 year, : 1980  Patient ID: -, MRN #: -, [facility]  Referring Physician: NELIDA LIEBERMAN  EEG #: [id number]    Study Date: 2021   Start Time: 08:00 End Date:  21        End Time: 08:00:04 AM     Study Duration: 24 H    Study Information:    EEG Recording Technique:  The patient underwent continuous Video-EEG monitoring, using Telemetry System hardware on the XLTek Digital System. EEG and video data were stored on a computer hard drive with important events saved in digital archive files. The material was reviewed by a physician (electroencephalographer / epileptologist) on a daily basis. Osiel and seizure detection algorithms were utilized and reviewed. An EEG Technician attended to the patient, and was available throughout daytime work hours.  The epilepsy center neurologist was available in person or on call 24-hours per day.    EEG Placement and Labeling of Electrodes:  The EEG was performed utilizing 20 channel referential EEG connections (coronal over temporal over parasagittal montage) using all standard 10-20 electrode placements with EKG, with additional electrodes placed in the inferior temporal region using the modified 10-10 montage electrode placements for elective admissions, or if deemed necessary. Recording was at a sampling rate of 256 samples per second per channel. Time synchronized digital video recording was done simultaneously with EEG recording. A low light infrared camera was used for low light recording.     History:   VEEG AT BEDSIDE SICU-05  41 YEAR OLD MALE   COR: INTUBATED/VENT  P/W: MALIGNANT NEOPLASM  PMH: HTN, ASTHMA, SZ, PROSTATE CA  HV: NOT COMPLETED DUE TO PANDEMIC  PHOTIC: NOT COMPLETED  A&OX 0  CONCERN FOR SEIZURES    Medication  Dilantin (Phenytoin)  Sublamize  Versed  PROTONIX  Keppra (Levetiracetam)  Potassium  Magnesium  Sodium Chloride  PERIDEX  Hibiclens  CEFAZOLIN  Lovenox    Interpretation:    [Abbreviation Key:  PDR=alpha rhythm/posterior dominant rhythm. A-P=anterior posterior gradient.  Amplitude: ‘very low’:<20; ‘low’:20-50; ‘medium’:; ‘high’:>200uV.  Persistence for periodic/rhythmic patterns (% of epoch) ‘rare’:<1%; ‘occasional’:1-10%; ‘frequent’:10-50%; ‘abundant’:50-90%; ‘continuous’:>90%.  Persistence for sporadic discharges: ‘rare’:<1/hr; ‘occasional’:1/min-1/hr; ‘frequent’:>1/min; ‘abundant’:>1/10 sec.  GRDA=generalized rhythmic delta activity, LRDA=lateralized rhythmic delta activity, TIRDA=temporal intermittent rhythmic delta activity, FIRDA=frontal intermittent rhythmic activity. LPD=PLED=lateralized periodic discharges, GPD=generalized periodic discharges, BiPDs=BiPLEDs=bilateral independent periodic epileptiform discharges, SIRPID=stimulus induced rhythmic, periodic, or ictal appearing discharges.  Modifiers: +F=with fast component, +S=with spike component, +R=with rhythmic component.  S-B=burst suppression pattern.  PFA: paroxysmal fast activity. Max=maximal. N1-drowsy, N2-stage II sleep, N3-slow wave sleep.  HV=hyperventilation, PS=photic stimulation]    Daily EEG Visual Analysis  FINDINGS: The background was continuous, spontaneously variable and reactive. During wakefulness, the posterior dominant rhythm was to  7-7.5hz activity 30uV.    Background Slowing:  Generalized slowing: intermittent polymorphic theta/delta activity  Focal Slowing: None was present.    Sleep Background:  Drowsiness was characterized by fragmentation, attenuation, and slowing of the background activity.    Stage II sleep transients were not recorded.    Other Non-Epileptiform Findings:  None were present.    Interictal Epileptiform Activity:   None were present.    Events:  Seizures: None recorded.    Activation Procedures:   Hyperventilation was not performed.    Photic stimulation was not performed.    Artifacts:  Frontal electrode artifacts noted.    ECG:  The heart rate on single channel ECG was predominantly between 70-90 BPM.    EEG Summary / Classification:  Abnormal EEG in the sedated patient.  -mild-moderate generalized slowing    EEG Impression / Clinical Correlate:  Findings are consistent with mild-moderate diffuse nonspecific cerebral dysfunction  No seizures or epileptiform activities recorded.        Mitch River MD FAES  Director, Continuous EEG Monitoring Program, [facility]   and Epilepsy Fellowship ,   Department of Neurology, [facility]    [facility] EEG Reading Room Ph#: [phone]  Epilepsy Answering Service after 5PM and before 8:30AM: Ph#: [phone]

## 2021-06-08 NOTE — PROGRESS NOTE ADULT - ASSESSMENT
41y Male w/ recently diagnosed prostate cancer s/p radical prostatectomy.  Operative course c/b 5 units blood loss, s/p 12 units pRBC, 7 units FFP, 3 units platelets, 14L crystalloids, and 1L albumin, admitted to the SICU intubated and for hemorrhage watch.    6/4: Sicu for hemodynamic monitoring H/H stable   6/5: Seizures, Neuro consulted, CTA CTH  6/6: seizures today, EEG in progress  6/7: Continues to have seizure activity, EEG in progress  6/8: No eleptiform activity on EEG, Febrile to 100.7 F overnight, remains intubated and sedated, TF at goal    - Intubated and sedated, wean sedation/SBT per SICU  - NPO w/ TF at goal   - replete electrolytes prn   - Trend Cr and I/Os  - Trend H/H, stable   - Continue lee catheter   - f/up neuro, rec: brain MRI

## 2021-06-08 NOTE — CHART NOTE - NSCHARTNOTEFT_GEN_A_CORE
Difficult windows. IVC appears dilated and non-variable with respiration. Overall LV/RV function normal. A-lines bilaterally on lung windows. Difficult windows. IVC appears dilated and non-variable with respiration. Overall LV/RV function normal. A-lines bilaterally on lung windows.  agree

## 2021-06-08 NOTE — PROGRESS NOTE ADULT - SUBJECTIVE AND OBJECTIVE BOX
SURGICAL INTENSIVE CARE UNIT AFTERNOON PROGRESS NOTE    HPI: 42 yo male presents to [facility] for preop evaluation for open radical retropubic prostatectomy, pelvic lymph node dissection.  Patient  with elevated PSA levels s/p prostate biopsy and diagnosed with malignant neoplasm of prostate.  Patient reports increased urinary frequency, denies dysuria or hematuria.     NEURO  - One episode of agitation, hypertension, and tachycardia. 5mg IV lopressor given w/ resolution of sxs.  - Febrile to 100.7, given tylenol  - ABG w/ PaO2 83, FiO2 increased to 50%  - TF at goal, maintenance fluids dc/d    RESPIRATORY  RR: 14 (06-07-21 @ 15:00) (14 - 23)  SpO2: 98% (06-07-21 @ 15:00) (93% - 100%)  Mechanical Ventilation: Mode: AC/ CMV (Assist Control/ Continuous Mandatory Ventilation), RR (machine): 14, RR (patient): 14, TV (machine): 440, FiO2: 30, PEEP: 5, ITime: 0.88, MAP: 8, PIP: 18  ABG - ( 07 Jun 2021 01:17 )  pH: 7.37  /  pCO2: 44    /  pO2: 97    / HCO3: 24    / Base Excess: x     /  SaO2: 97.4    Lactate: x          CARDIOVASCULAR  HR: 76 (06-07-21 @ 15:00) (68 - 159)  BP: 155/101 (06-07-21 @ 08:00) (107/74 - 155/101)  BP(mean): 115 (06-07-21 @ 08:00) (85 - 115)  ABP: 143/87 (06-07-21 @ 15:00) (88/58 - 192/112)  ABP(mean): 108 (06-07-21 @ 15:00) (69 - 142)    GI/NUTRITION  Diet: NPO   MANUEL placed     GENITOURINARY  I&O's Detail    06-06 @ 07:01  -  06-07 @ 07:00  --------------------------------------------------------  IN:    Dexmedetomidine: 94.6 mL    dextrose 5% + sodium chloride 0.45% w/ Additives: 1800 mL    FentaNYL: 278.4 mL    IV PiggyBack: 350 mL    Lactated Ringers Bolus: 1000 mL    Midazolam: 8.7 mL    Propofol: 187.5 mL  Total IN: 3719.2 mL    OUT:    Bulb (mL): 25 mL    Indwelling Catheter - Urethral (mL): 4775 mL  Total OUT: 4800 mL    Total NET: -1080.8 mL      06-07 @ 07:01  -  06-07 @ 15:06  --------------------------------------------------------  IN:    Dexmedetomidine: 101.5 mL    dextrose 5% + sodium chloride 0.45% w/ Additives: 525 mL    FentaNYL: 81.2 mL    Propofol: 74.6 mL  Total IN: 782.3 mL    OUT:    Indwelling Catheter - Urethral (mL): 2170 mL  Total OUT: 2170 mL    Total NET: -1387.7 mL      06-07    139  |  107  |  4<L>  ----------------------------<  102<H>  4.0   |  23  |  0.51    Ca    7.9<L>      07 Jun 2021 01:17  Phos  2.8     06-07  Mg     1.5     06-07      HEMATOLOGIC                        11.3   8.20  )-----------( 118      ( 07 Jun 2021 01:17 )             35.5    SURGICAL INTENSIVE CARE UNIT AFTERNOON PROGRESS NOTE    HPI: 42 yo male presents to [facility] for preop evaluation for open radical retropubic prostatectomy, pelvic lymph node dissection.  Patient  with elevated PSA levels s/p prostate biopsy and diagnosed with malignant neoplasm of prostate.  Patient reports increased urinary frequency, denies dysuria or hematuria.     NEURO  - One episode of agitation, hypertension, and tachycardia. 5mg IV lopressor given w/ resolution of sxs.  - Febrile to 100.7, given tylenol  - ABG w/ PaO2 83, FiO2 increased to 50%  - TF at goal, maintenance fluids dc/d  - Repleted magnesium    RESPIRATORY  RR: 14 (06-07-21 @ 15:00) (14 - 23)  SpO2: 98% (06-07-21 @ 15:00) (93% - 100%)  Mechanical Ventilation: Mode: AC/ CMV (Assist Control/ Continuous Mandatory Ventilation), RR (machine): 14, RR (patient): 14, TV (machine): 440, FiO2: 30, PEEP: 5, ITime: 0.88, MAP: 8, PIP: 18  ABG - ( 07 Jun 2021 01:17 )  pH: 7.37  /  pCO2: 44    /  pO2: 97    / HCO3: 24    / Base Excess: x     /  SaO2: 97.4    Lactate: x          CARDIOVASCULAR  HR: 76 (06-07-21 @ 15:00) (68 - 159)  BP: 155/101 (06-07-21 @ 08:00) (107/74 - 155/101)  BP(mean): 115 (06-07-21 @ 08:00) (85 - 115)  ABP: 143/87 (06-07-21 @ 15:00) (88/58 - 192/112)  ABP(mean): 108 (06-07-21 @ 15:00) (69 - 142)    GI/NUTRITION  Diet: NPO   MANUEL placed     GENITOURINARY  I&O's Detail    06-06 @ 07:01  -  06-07 @ 07:00  --------------------------------------------------------  IN:    Dexmedetomidine: 94.6 mL    dextrose 5% + sodium chloride 0.45% w/ Additives: 1800 mL    FentaNYL: 278.4 mL    IV PiggyBack: 350 mL    Lactated Ringers Bolus: 1000 mL    Midazolam: 8.7 mL    Propofol: 187.5 mL  Total IN: 3719.2 mL    OUT:    Bulb (mL): 25 mL    Indwelling Catheter - Urethral (mL): 4775 mL  Total OUT: 4800 mL    Total NET: -1080.8 mL      06-07 @ 07:01  -  06-07 @ 15:06  --------------------------------------------------------  IN:    Dexmedetomidine: 101.5 mL    dextrose 5% + sodium chloride 0.45% w/ Additives: 525 mL    FentaNYL: 81.2 mL    Propofol: 74.6 mL  Total IN: 782.3 mL    OUT:    Indwelling Catheter - Urethral (mL): 2170 mL  Total OUT: 2170 mL    Total NET: -1387.7 mL      06-07    139  |  107  |  4<L>  ----------------------------<  102<H>  4.0   |  23  |  0.51    Ca    7.9<L>      07 Jun 2021 01:17  Phos  2.8     06-07  Mg     1.5     06-07      HEMATOLOGIC                        11.3   8.20  )-----------( 118      ( 07 Jun 2021 01:17 )             35.5    SURGICAL INTENSIVE CARE UNIT AFTERNOON PROGRESS NOTE    Overnight events:    - One episode of agitation, hypertension, and tachycardia. 5mg IV lopressor given w/ resolution of sxs.  - Febrile to 100.7, given tylenol  - ABG w/ PaO2 83, FiO2 increased to 50%  - TF at goal, maintenance fluids dc/d  - Repleted magnesium    HPI: 42 yo male presents to [facility] for preop evaluation for open radical retropubic prostatectomy, pelvic lymph node dissection.  Patient  with elevated PSA levels s/p prostate biopsy and diagnosed with malignant neoplasm of prostate.  Patient reports increased urinary frequency, denies dysuria or hematuria.     NEURO      RESPIRATORY  RR: 14 (06-07-21 @ 15:00) (14 - 23)  SpO2: 98% (06-07-21 @ 15:00) (93% - 100%)  Mechanical Ventilation: Mode: AC/ CMV (Assist Control/ Continuous Mandatory Ventilation), RR (machine): 14, RR (patient): 14, TV (machine): 440, FiO2: 30, PEEP: 5, ITime: 0.88, MAP: 8, PIP: 18  ABG - ( 07 Jun 2021 01:17 )  pH: 7.37  /  pCO2: 44    /  pO2: 97    / HCO3: 24    / Base Excess: x     /  SaO2: 97.4    Lactate: x          CARDIOVASCULAR  HR: 76 (06-07-21 @ 15:00) (68 - 159)  BP: 155/101 (06-07-21 @ 08:00) (107/74 - 155/101)  BP(mean): 115 (06-07-21 @ 08:00) (85 - 115)  ABP: 143/87 (06-07-21 @ 15:00) (88/58 - 192/112)  ABP(mean): 108 (06-07-21 @ 15:00) (69 - 142)    GI/NUTRITION  Diet: NPO   MANUEL placed     GENITOURINARY  I&O's Detail    06-06 @ 07:01  -  06-07 @ 07:00  --------------------------------------------------------  IN:    Dexmedetomidine: 94.6 mL    dextrose 5% + sodium chloride 0.45% w/ Additives: 1800 mL    FentaNYL: 278.4 mL    IV PiggyBack: 350 mL    Lactated Ringers Bolus: 1000 mL    Midazolam: 8.7 mL    Propofol: 187.5 mL  Total IN: 3719.2 mL    OUT:    Bulb (mL): 25 mL    Indwelling Catheter - Urethral (mL): 4775 mL  Total OUT: 4800 mL    Total NET: -1080.8 mL      06-07 @ 07:01  -  06-07 @ 15:06  --------------------------------------------------------  IN:    Dexmedetomidine: 101.5 mL    dextrose 5% + sodium chloride 0.45% w/ Additives: 525 mL    FentaNYL: 81.2 mL    Propofol: 74.6 mL  Total IN: 782.3 mL    OUT:    Indwelling Catheter - Urethral (mL): 2170 mL  Total OUT: 2170 mL    Total NET: -1387.7 mL      06-07    139  |  107  |  4<L>  ----------------------------<  102<H>  4.0   |  23  |  0.51    Ca    7.9<L>      07 Jun 2021 01:17  Phos  2.8     06-07  Mg     1.5     06-07      HEMATOLOGIC                        11.3   8.20  )-----------( 118      ( 07 Jun 2021 01:17 )             35.5    SURGICAL INTENSIVE CARE UNIT AFTERNOON PROGRESS NOTE    Overnight events:    - One episode of agitation, hypertension, and tachycardia. 5mg IV lopressor given w/ resolution of sxs.  - Febrile to 100.7, given tylenol  - ABG w/ PaO2 83, FiO2 increased to 50%  - TF at goal, maintenance fluids dc/d  - Repleted magnesium    HPI: 40 yo male presents to [facility] for preop evaluation for open radical retropubic prostatectomy, pelvic lymph node dissection.  Patient  with elevated PSA levels s/p prostate biopsy and diagnosed with malignant neoplasm of prostate.  Patient reports increased urinary frequency, denies dysuria or hematuria.     NEURO: waking up following simple commands    RESPIRATORY  RR: 14 (06-07-21 @ 15:00) (14 - 23)  SpO2: 98% (06-07-21 @ 15:00) (93% - 100%)  Mechanical Ventilation: Mode: AC/ CMV (Assist Control/ Continuous Mandatory Ventilation), RR (machine): 14, RR (patient): 14, TV (machine): 440, FiO2: 30, PEEP: 5, ITime: 0.88, MAP: 8, PIP: 18  ABG - ( 07 Jun 2021 01:17 )  pH: 7.37  /  pCO2: 44    /  pO2: 97    / HCO3: 24    / Base Excess: x     /  SaO2: 97.4    Lactate: x          CARDIOVASCULAR  HR: 76 (06-07-21 @ 15:00) (68 - 159)  BP: 155/101 (06-07-21 @ 08:00) (107/74 - 155/101)  BP(mean): 115 (06-07-21 @ 08:00) (85 - 115)  ABP: 143/87 (06-07-21 @ 15:00) (88/58 - 192/112)  ABP(mean): 108 (06-07-21 @ 15:00) (69 - 142)    GI/NUTRITION  Diet: NPO   MANUEL placed     GENITOURINARY  I&O's Detail    06-06 @ 07:01  -  06-07 @ 07:00  --------------------------------------------------------  IN:    Dexmedetomidine: 94.6 mL    dextrose 5% + sodium chloride 0.45% w/ Additives: 1800 mL    FentaNYL: 278.4 mL    IV PiggyBack: 350 mL    Lactated Ringers Bolus: 1000 mL    Midazolam: 8.7 mL    Propofol: 187.5 mL  Total IN: 3719.2 mL    OUT:    Bulb (mL): 25 mL    Indwelling Catheter - Urethral (mL): 4775 mL  Total OUT: 4800 mL    Total NET: -1080.8 mL      06-07 @ 07:01  -  06-07 @ 15:06  --------------------------------------------------------  IN:    Dexmedetomidine: 101.5 mL    dextrose 5% + sodium chloride 0.45% w/ Additives: 525 mL    FentaNYL: 81.2 mL    Propofol: 74.6 mL  Total IN: 782.3 mL    OUT:    Indwelling Catheter - Urethral (mL): 2170 mL  Total OUT: 2170 mL    Total NET: -1387.7 mL      06-07    139  |  107  |  4<L>  ----------------------------<  102<H>  4.0   |  23  |  0.51    Ca    7.9<L>      07 Jun 2021 01:17  Phos  2.8     06-07  Mg     1.5     06-07      HEMATOLOGIC                        11.3   8.20  )-----------( 118      ( 07 Jun 2021 01:17 )             35.5

## 2021-06-08 NOTE — PROGRESS NOTE ADULT - ASSESSMENT
ASSESSMENT:  41M s/p radical prostatectomy w pelvic LN dissection, c/b intraop hemorrhage, now w concern for seizure v. agitation v. ETOH withdrawal.     PLAN:    Neurologic: h/o seizures  - Sedated on propofol, fentanyl and precedex gtt, will wean propofol, then fentanyl titrate to RASS -1  - Ativan PRN agitation  - Keppra 1000 mg BID  - Neurology following  - Video EEG, preliminary no seizure activity  - CTA head/neck negative     Respiratory:  - Intubated Volume A/C: 14/440/5/50%  - Monitor ABGs    Cardiovascular: h/o HTN  - C/w meoprolol 5mg q6hr  - 6am metoprolol given at 4am due to tachycardia and hypertension (6/8)    Gastrointestinal/Nutrition:  - [place] feeding tube, TF at goal    Renal/Genitourinary: s/p radical prostatectomy  - Follow I/Os  - Trend CR, electrolytes  - Dc/d maintenance fluids as TF @ goal    Hematologic: no acute issues  - Trend CBC  - DVT ppx: Lovenox    Infectious Disease  - Trend WBC count, fever curve    Tubes/Lines/Drains:  -L radial a-line  -PIVs  -White catheter  -L PANCHO    Endocrine: No acute issues  -Monitor serum glucose on BMP    Disposition: SICU   ASSESSMENT:  41M s/p radical prostatectomy w pelvic LN dissection, c/b intraop hemorrhage, now w concern for seizure v. agitation v. ETOH withdrawal but waking up and following commands as of 6/7    Neurologic: h/o seizures  - Sedated on fentanyl and precedex gtt, now off propofol  - Ativan PRN agitation  - Keppra 1000 mg BID  - Neurology following  - Video EEG, preliminary no seizure activity  - CTA head/neck negative     Respiratory:  - Intubated Volume A/C: 14/440/5/50%  - Monitor ABGs  - F/u XR read possible repeat it  - Obtain POCUS    Cardiovascular: h/o HTN  - C/w meoprolol 5mg q6hr  - 6am metoprolol given at 4am due to tachycardia and hypertension (6/8)    Gastrointestinal/Nutrition:  - [place] feeding tube, TF at goal    Renal/Genitourinary: s/p radical prostatectomy  - Follow I/Os  - Trend CR, electrolytes  - Dc/d maintenance fluids as TF @ goal    Hematologic: no acute issues  - Trend CBC  - DVT ppx: Lovenox    Infectious Disease  - Trend WBC count, fever curve    Tubes/Lines/Drains:  -L radial a-line  -PIVs  -White catheter  -L PANCHO    Endocrine: No acute issues  -Monitor serum glucose on BMP    Disposition: SICU       ASSESSMENT:  41M s/p radical prostatectomy w pelvic LN dissection, c/b intraop hemorrhage, now w concern for seizure v. agitation v. ETOH withdrawal but waking up and following commands as of 6/7    Neurologic: h/o seizures  - Sedated on fentanyl and precedex gtt, now off propofol  - Ativan PRN agitation  - Keppra 1000 mg BID  - Neurology following  - Video EEG, preliminary no seizure activity  - CTA head/neck negative     Respiratory:  - Intubated Volume A/C: 14/440/5/50%  - Monitor ABGs  - F/u XR read possible repeat it  - Obtain POCUS    Cardiovascular: h/o HTN  - C/w meoprolol 5mg q6hr  - 6am metoprolol given at 4am due to tachycardia and hypertension (6/8)    Gastrointestinal/Nutrition:  - [place] feeding tube, TF at goal    Renal/Genitourinary: s/p radical prostatectomy  - Follow I/Os  - Trend CR, electrolytes  - Dc/d maintenance fluids as TF @ goal    Hematologic: no acute issues  - Trend CBC  - DVT ppx: Lovenox    Infectious Disease  - Trend WBC count, fever curve    Tubes/Lines/Drains:  -L radial a-line  -PIVs  -White catheter  -L PANCHO    Endocrine: No acute issues  -Monitor serum glucose on BMP    Disposition: SICU  Critical Care Diagnosis: Seizure disorder  Respiratory insufficiency secondary to inability to wean  Malnutrition  Acute posthemorrhage anemia

## 2021-06-08 NOTE — PROGRESS NOTE ADULT - SUBJECTIVE AND OBJECTIVE BOX
Interval events:   Agitation given 5 IV lopressor. Febrile to 100.7 F.     SUBJECTIVE:  Intubated and sedated on precedex, fentanyl, propofol       OBJECTIVE:  Vital Signs Last 24 Hrs  T(C): 37.7 (08 Jun 2021 08:00), Max: 38.2 (08 Jun 2021 04:00)  T(F): 99.8 (08 Jun 2021 08:00), Max: 100.7 (08 Jun 2021 04:00)  HR: 94 (08 Jun 2021 07:30) (70 - 139)  BP: 99/76 (08 Jun 2021 07:30) (94/73 - 99/76)  BP(mean): 84 (08 Jun 2021 07:30) (80 - 84)  RR: 18 (08 Jun 2021 07:30) (13 - 25)  SpO2: 98% (08 Jun 2021 07:30) (88% - 100%)    Physical Examination:  GEN: sedated   PULM: intubated  ABD: soft, nontender, nondistended, incision CDI, PANCHO SS  : lee secured, clear urine       LABS:                        12.3   8.49  )-----------( 133      ( 08 Jun 2021 01:10 )             38.2       06-08    137  |  106  |  4<L>  ----------------------------<  111<H>  4.0   |  20<L>  |  0.40<L>    Ca    8.2<L>      08 Jun 2021 01:10  Phos  3.1     06-08  Mg     1.3     06-08

## 2021-06-08 NOTE — PROGRESS NOTE ADULT - ATTENDING COMMENTS
I agree with the history, physical, and plan, which I have reviewed and edited where appropriate.  I agree with notes/assessment and detailed interval history of the health care providers on my service.  The patient is in SICU with diagnosis mentioned in the note.    The patient is a critical care patient with life threatening hemodynamic and metabolic instability in SICU.  The SICU team has a constant risk benefit analyzes discussion and coordinating care with the primary team, all consultants, House Staff and PA's..  I was physically present for the key portions of the evaluation and management (E/M) service provided.    The documentation of the total time spent 62 minutes  41M s/p radical prostatectomy w pelvic LN dissection, c/b intraop hemorrhage and ETOH withdrawal but waking up and following commands   I have personally examined the patient, reviewed data and laboratory tests/x-rays and all pertinent electronic images.  NEURO: waking up following simple commands  RESPIRATORY  RR: 14   SpO2: 98%   Mechanical Ventilation: Mode: AC/ CMV   CARDIOVASCULAR  HR: 76   BP: 155/101   GI/NUTRITION  Diet: NPO     The plan is specified below:  Neurologic:  - Sedated on fentanyl and precedex gtt, now off propofol  - Ativan PRN agitation  - Keppra 1000 mg BID  - Neurology following     Respiratory:  - Intubated Volume A/C: 14/440/5/50%  -  Cardiovascular:  - C/w meoprolol 5mg q6hr    Gastrointestinal/Nutrition:  - K feeding tube, TF at goal    Renal/Genitourinary:   - Dc/d maintenance fluids as TF @ goal    Hematologic:   - DVT ppx: Lovenox    Infectious Disease  - Trend WBC count, fever curve    Endocrine: No acute issues  -Monitor serum glucose on BMP    Disposition: SICU  Critical Care Diagnosis: Seizure disorder  Respiratory insufficiency secondary to inability to wean  Malnutrition  Acute posthemorrhage anemia

## 2021-06-08 NOTE — PROGRESS NOTE ADULT - SUBJECTIVE AND OBJECTIVE BOX
SUBJECTIVE: No overnight events. Currently on precedex 0.4mvg/kg/hr and fentanyl 2mcg/kg/hr    Vital Signs Last 24 Hrs  T(C): 38 (08 Jun 2021 12:00), Max: 38.2 (08 Jun 2021 04:00)  T(F): 100.4 (08 Jun 2021 12:00), Max: 100.7 (08 Jun 2021 04:00)  HR: 104 (08 Jun 2021 12:00) (73 - 139)  BP: 95/64 (08 Jun 2021 12:00) (91/57 - 100/58)  BP(mean): 74 (08 Jun 2021 12:00) (68 - 84)  RR: 19 (08 Jun 2021 12:00) (13 - 25)  SpO2: 100% (08 Jun 2021 12:00) (88% - 100%)      Gen: NAD, intubated  Ext: no edema  skin: warm dry  Neurological (>12):  MS: Intubated, lightly sedated (precedex and fentanyl), opens eyes to voice, follows some simple commands.  CNs: PERRLA 1mm bilaterally and reactive, BTT bilaterally, no facial asymmetry  Motor: Moves all extremities spontaneously. UE moves antigravity. Trace antigravity movement in LE    Sensation: intact to LT      LABORATORY:  CBC                       12.3   8.49  )-----------( 133      ( 08 Jun 2021 01:10 )             38.2     Chem 06-08    137  |  106  |  4<L>  ----------------------------<  111<H>  4.0   |  20<L>  |  0.40<L>    Ca    8.2<L>      08 Jun 2021 01:10  Phos  3.1     06-08  Mg     1.3     06-08      MEDICATIONS  (STANDING):  acetaminophen    Suspension .. 975 milliGRAM(s) Oral every 6 hours  chlorhexidine 0.12% Liquid 15 milliLiter(s) Oral Mucosa every 12 hours  chlorhexidine 4% Liquid 1 Application(s) Topical daily  dexMEDEtomidine Infusion 0.2 MICROgram(s)/kG/Hr (2.91 mL/Hr) IV Continuous <Continuous>  enoxaparin Injectable 40 milliGRAM(s) SubCutaneous daily  fentaNYL   Infusion 0.5 MICROgram(s)/kG/Hr (2.91 mL/Hr) IV Continuous <Continuous>  levETIRAcetam  IVPB 1000 milliGRAM(s) IV Intermittent every 12 hours  metoprolol tartrate 25 milliGRAM(s) Oral two times a day  pantoprazole  Injectable 40 milliGRAM(s) IV Push daily  propofol Infusion 19.994 MICROgram(s)/kG/Min (6.97 mL/Hr) IV Continuous <Continuous>    MEDICATIONS  (PRN):  LORazepam   Injectable 1 milliGRAM(s) IV Push every 4 hours PRN Agitation    EEG Summary / Classification:  Abnormal EEG in the sedated patient.  -mild-moderate generalized slowing    EEG Impression / Clinical Correlate:  Findings are consistent with mild-moderate diffuse nonspecific cerebral dysfunction  No seizures or epileptiform activities recorded.   SUBJECTIVE: No overnight events. Currently on precedex 0.4mvg/kg/hr and fentanyl 2mcg/kg/hr    Vital Signs Last 24 Hrs  T(C): 38 (08 Jun 2021 12:00), Max: 38.2 (08 Jun 2021 04:00)  T(F): 100.4 (08 Jun 2021 12:00), Max: 100.7 (08 Jun 2021 04:00)  HR: 104 (08 Jun 2021 12:00) (73 - 139)  BP: 95/64 (08 Jun 2021 12:00) (91/57 - 100/58)  BP(mean): 74 (08 Jun 2021 12:00) (68 - 84)  RR: 19 (08 Jun 2021 12:00) (13 - 25)  SpO2: 100% (08 Jun 2021 12:00) (88% - 100%)      Gen: NAD, intubated  Ext: no edema  skin: warm dry  Neurological (>12):  MS: Intubated, lightly sedated (precedex and fentanyl), opens eyes to voice, follows some simple commands.  CNs: PERRLA 1mm bilaterally and reactive, BTT bilaterally, no facial asymmetry  Motor: Moves all extremities spontaneously. UE moves antigravity. Trace antigravity movement in LE    Sensation: intact to LT      LABORATORY:  CBC                       12.3   8.49  )-----------( 133      ( 08 Jun 2021 01:10 )             38.2     Chem 06-08    137  |  106  |  4<L>  ----------------------------<  111<H>  4.0   |  20<L>  |  0.40<L>    Ca    8.2<L>      08 Jun 2021 01:10  Phos  3.1     06-08  Mg     1.3     06-08      MEDICATIONS  (STANDING):  acetaminophen    Suspension .. 975 milliGRAM(s) Oral every 6 hours  chlorhexidine 0.12% Liquid 15 milliLiter(s) Oral Mucosa every 12 hours  chlorhexidine 4% Liquid 1 Application(s) Topical daily  dexMEDEtomidine Infusion 0.2 MICROgram(s)/kG/Hr (2.91 mL/Hr) IV Continuous <Continuous>  enoxaparin Injectable 40 milliGRAM(s) SubCutaneous daily  fentaNYL   Infusion 0.5 MICROgram(s)/kG/Hr (2.91 mL/Hr) IV Continuous <Continuous>  levETIRAcetam  IVPB 1000 milliGRAM(s) IV Intermittent every 12 hours  metoprolol tartrate 25 milliGRAM(s) Oral two times a day  pantoprazole  Injectable 40 milliGRAM(s) IV Push daily  propofol Infusion 19.994 MICROgram(s)/kG/Min (6.97 mL/Hr) IV Continuous <Continuous>    MEDICATIONS  (PRN):  LORazepam   Injectable 1 milliGRAM(s) IV Push every 4 hours PRN Agitation    Continuous EEG 6/8  EEG Summary / Classification:  Abnormal EEG in the sedated patient.  -mild-moderate generalized slowing    EEG Impression / Clinical Correlate:  Findings are consistent with mild-moderate diffuse nonspecific cerebral dysfunction  No seizures or epileptiform activities recorded.

## 2021-06-08 NOTE — PROGRESS NOTE ADULT - ASSESSMENT
42 yo male with recently diagnosed prostate cancer admitted for elective prostatectomy w/ operative course c/b 5 units blood loss, s/p 12 units pRBC, 7 units FFP, 3 units platelets, 14L crystalloids, and 1L albumin, admitted to the SICU intubated and for hemorrhage watch. Post operatively, when weaning sedation patient had UE shaking and then whole body shaking concerning for seizure. Loaded with Keppra and started on 500mg BID. EEG with slowing, no electrographic correlate with push button events. CTH/A negative. Exam limited due to sedation, but with no focal neurologic deficits. Seizure/convulsions may have been precipitated by weaning sedation or possibly metabolic etiology. No known risk factors for seizure.     Recommendations  - continue vEEG for now while weaning sedation  - wean sedation as tolerated  - can continue Keppra 1g BID IV for now  - consider MRI brain w/o contrast once stable    40 yo male with recently diagnosed prostate cancer admitted for elective prostatectomy w/ operative course c/b 5 units blood loss, s/p 12 units pRBC, 7 units FFP, 3 units platelets, 14L crystalloids, and 1L albumin, admitted to the SICU intubated and for hemorrhage watch. Post operatively, when weaning sedation patient had UE shaking and then whole body shaking concerning for seizure. Loaded with Keppra and started on 500mg BID. EEG with slowing, no electrographic correlate with push button events. CTH/A negative. Exam limited due to sedation, but with no focal neurologic deficits. Seizure/convulsions may have been precipitated by weaning sedation or possibly metabolic etiology. No known risk factors for seizure.     Recommendations  - can discontinue EEG  - wean sedation as tolerated  - No clear indication for Keppra. Can start weaning down, going to 750mg BID.   - consider MRI brain w/o contrast once stable

## 2021-06-08 NOTE — PROGRESS NOTE ADULT - ATTENDING COMMENTS
Pt seen and discussed on rounds. As per SICU team they are titrating down on sedation. Today he is off Propofol, wakes up to voice, following some commands, and moving all limbs.   EEG has been negative thus far. Since patient is improving and there are no abnormalities, further EEG recording would not be helpful and thus can be discontinued. SInce this was likely a provoked event and  no seizure potential has been found, there is no clear indication to continue Keppra and this can be discontinued. Will titrate off.

## 2021-06-09 LAB
ANION GAP SERPL CALC-SCNC: 13 MMOL/L — SIGNIFICANT CHANGE UP (ref 7–14)
BUN SERPL-MCNC: 10 MG/DL — SIGNIFICANT CHANGE UP (ref 7–23)
CALCIUM SERPL-MCNC: 8.2 MG/DL — LOW (ref 8.4–10.5)
CHLORIDE SERPL-SCNC: 102 MMOL/L — SIGNIFICANT CHANGE UP (ref 98–107)
CO2 SERPL-SCNC: 21 MMOL/L — LOW (ref 22–31)
CREAT SERPL-MCNC: 0.46 MG/DL — LOW (ref 0.5–1.3)
GLUCOSE SERPL-MCNC: 116 MG/DL — HIGH (ref 70–99)
HCT VFR BLD CALC: 37.1 % — LOW (ref 39–50)
HGB BLD-MCNC: 11.9 G/DL — LOW (ref 13–17)
MAGNESIUM SERPL-MCNC: 1.7 MG/DL — SIGNIFICANT CHANGE UP (ref 1.6–2.6)
MCHC RBC-ENTMCNC: 28.3 PG — SIGNIFICANT CHANGE UP (ref 27–34)
MCHC RBC-ENTMCNC: 32.1 GM/DL — SIGNIFICANT CHANGE UP (ref 32–36)
MCV RBC AUTO: 88.1 FL — SIGNIFICANT CHANGE UP (ref 80–100)
NRBC # BLD: 0 /100 WBCS — SIGNIFICANT CHANGE UP
NRBC # FLD: 0 K/UL — SIGNIFICANT CHANGE UP
PHOSPHATE SERPL-MCNC: 3.2 MG/DL — SIGNIFICANT CHANGE UP (ref 2.5–4.5)
PLATELET # BLD AUTO: 175 K/UL — SIGNIFICANT CHANGE UP (ref 150–400)
POTASSIUM SERPL-MCNC: 3.6 MMOL/L — SIGNIFICANT CHANGE UP (ref 3.5–5.3)
POTASSIUM SERPL-SCNC: 3.6 MMOL/L — SIGNIFICANT CHANGE UP (ref 3.5–5.3)
RBC # BLD: 4.21 M/UL — SIGNIFICANT CHANGE UP (ref 4.2–5.8)
RBC # FLD: 14.5 % — SIGNIFICANT CHANGE UP (ref 10.3–14.5)
SODIUM SERPL-SCNC: 136 MMOL/L — SIGNIFICANT CHANGE UP (ref 135–145)
WBC # BLD: 7.05 K/UL — SIGNIFICANT CHANGE UP (ref 3.8–10.5)
WBC # FLD AUTO: 7.05 K/UL — SIGNIFICANT CHANGE UP (ref 3.8–10.5)

## 2021-06-09 PROCEDURE — 95720 EEG PHY/QHP EA INCR W/VEEG: CPT

## 2021-06-09 PROCEDURE — 71045 X-RAY EXAM CHEST 1 VIEW: CPT | Mod: 26

## 2021-06-09 PROCEDURE — 99291 CRITICAL CARE FIRST HOUR: CPT

## 2021-06-09 RX ORDER — MAGNESIUM SULFATE 500 MG/ML
2 VIAL (ML) INJECTION ONCE
Refills: 0 | Status: COMPLETED | OUTPATIENT
Start: 2021-06-09 | End: 2021-06-09

## 2021-06-09 RX ORDER — METOPROLOL TARTRATE 50 MG
10 TABLET ORAL EVERY 6 HOURS
Refills: 0 | Status: DISCONTINUED | OUTPATIENT
Start: 2021-06-09 | End: 2021-06-10

## 2021-06-09 RX ORDER — METOPROLOL TARTRATE 50 MG
5 TABLET ORAL ONCE
Refills: 0 | Status: COMPLETED | OUTPATIENT
Start: 2021-06-09 | End: 2021-06-09

## 2021-06-09 RX ORDER — THIAMINE MONONITRATE (VIT B1) 100 MG
100 TABLET ORAL DAILY
Refills: 0 | Status: COMPLETED | OUTPATIENT
Start: 2021-06-09 | End: 2021-06-12

## 2021-06-09 RX ORDER — METOPROLOL TARTRATE 50 MG
10 TABLET ORAL EVERY 6 HOURS
Refills: 0 | Status: DISCONTINUED | OUTPATIENT
Start: 2021-06-09 | End: 2021-06-09

## 2021-06-09 RX ORDER — POTASSIUM CHLORIDE 20 MEQ
10 PACKET (EA) ORAL
Refills: 0 | Status: COMPLETED | OUTPATIENT
Start: 2021-06-09 | End: 2021-06-09

## 2021-06-09 RX ORDER — DILTIAZEM HCL 120 MG
10 CAPSULE, EXT RELEASE 24 HR ORAL ONCE
Refills: 0 | Status: COMPLETED | OUTPATIENT
Start: 2021-06-09 | End: 2021-06-10

## 2021-06-09 RX ORDER — LEVETIRACETAM 250 MG/1
750 TABLET, FILM COATED ORAL EVERY 12 HOURS
Refills: 0 | Status: DISCONTINUED | OUTPATIENT
Start: 2021-06-09 | End: 2021-06-12

## 2021-06-09 RX ORDER — METOPROLOL TARTRATE 50 MG
7.5 TABLET ORAL EVERY 6 HOURS
Refills: 0 | Status: DISCONTINUED | OUTPATIENT
Start: 2021-06-09 | End: 2021-06-09

## 2021-06-09 RX ADMIN — Medication 5 MILLIGRAM(S): at 12:29

## 2021-06-09 RX ADMIN — Medication 7.5 MILLIGRAM(S): at 21:31

## 2021-06-09 RX ADMIN — PANTOPRAZOLE SODIUM 40 MILLIGRAM(S): 20 TABLET, DELAYED RELEASE ORAL at 12:30

## 2021-06-09 RX ADMIN — FENTANYL CITRATE 2.91 MICROGRAM(S)/KG/HR: 50 INJECTION INTRAVENOUS at 08:01

## 2021-06-09 RX ADMIN — Medication 100 MILLIEQUIVALENT(S): at 03:52

## 2021-06-09 RX ADMIN — Medication 1000 MILLIGRAM(S): at 14:30

## 2021-06-09 RX ADMIN — Medication 100 MILLIEQUIVALENT(S): at 05:08

## 2021-06-09 RX ADMIN — Medication 400 MILLIGRAM(S): at 14:00

## 2021-06-09 RX ADMIN — Medication 400 MILLIGRAM(S): at 08:00

## 2021-06-09 RX ADMIN — PROPOFOL 6.97 MICROGRAM(S)/KG/MIN: 10 INJECTION, EMULSION INTRAVENOUS at 08:01

## 2021-06-09 RX ADMIN — CHLORHEXIDINE GLUCONATE 15 MILLILITER(S): 213 SOLUTION TOPICAL at 05:44

## 2021-06-09 RX ADMIN — CHLORHEXIDINE GLUCONATE 1 APPLICATION(S): 213 SOLUTION TOPICAL at 12:30

## 2021-06-09 RX ADMIN — Medication 50 GRAM(S): at 05:08

## 2021-06-09 RX ADMIN — Medication 1000 MILLIGRAM(S): at 02:05

## 2021-06-09 RX ADMIN — Medication 5 MILLIGRAM(S): at 00:15

## 2021-06-09 RX ADMIN — LEVETIRACETAM 400 MILLIGRAM(S): 250 TABLET, FILM COATED ORAL at 05:44

## 2021-06-09 RX ADMIN — DEXMEDETOMIDINE HYDROCHLORIDE IN 0.9% SODIUM CHLORIDE 2.91 MICROGRAM(S)/KG/HR: 4 INJECTION INTRAVENOUS at 08:01

## 2021-06-09 RX ADMIN — Medication 100 MILLIEQUIVALENT(S): at 02:40

## 2021-06-09 RX ADMIN — Medication 400 MILLIGRAM(S): at 01:35

## 2021-06-09 RX ADMIN — ENOXAPARIN SODIUM 40 MILLIGRAM(S): 100 INJECTION SUBCUTANEOUS at 12:30

## 2021-06-09 RX ADMIN — Medication 1000 MILLIGRAM(S): at 08:30

## 2021-06-09 RX ADMIN — Medication 100 MILLIGRAM(S): at 12:29

## 2021-06-09 RX ADMIN — Medication 5 MILLIGRAM(S): at 22:52

## 2021-06-09 RX ADMIN — Medication 5 MILLIGRAM(S): at 17:25

## 2021-06-09 RX ADMIN — LEVETIRACETAM 400 MILLIGRAM(S): 250 TABLET, FILM COATED ORAL at 17:26

## 2021-06-09 RX ADMIN — Medication 5 MILLIGRAM(S): at 05:44

## 2021-06-09 NOTE — PROGRESS NOTE ADULT - ASSESSMENT
ASSESSMENT:  41M s/p radical prostatectomy w pelvic LN dissection, c/b intraop hemorrhage, now w concern for seizure v. agitation v. ETOH withdrawal but waking up and following commands as of 6/7    Neurologic: h/o seizures  - Sedated on fentanyl, precedex , and propofol gtt  - Ativan PRN agitation  - Keppra 1000 mg BID  - Neurology following  - Video EEG, preliminary no seizure activity  - CTA head/neck negative     Respiratory:  - Intubated Volume A/C: 14/440/5/40%  - Attempt weaning sedation in the AM as pt. at high risk for self-extubation  - Monitor ABGs  - F/u XR    Cardiovascular: h/o HTN  - C/w metoprolol 5mg q6hr    Gastrointestinal/Nutrition:  - NGT for vomiting  - NPO  - Consider re-starting feeds or placing on maintenance fluids in the AM    Renal/Genitourinary: s/p radical prostatectomy  - Follow I/Os  - Trend CR, electrolytes    Hematologic: no acute issues  - Trend CBC  - DVT ppx: Lovenox    Infectious Disease  - Trend WBC count, fever curve    Tubes/Lines/Drains:  -L radial a-line  -PIVs  -White catheter  -L PANCHO    Endocrine: No acute issues  -Monitor serum glucose on BMP    Disposition: SICU  Critical Care Diagnosis: Seizure disorder  Respiratory insufficiency secondary to inability to wean  Malnutrition  Acute posthemorrhage anemia     ASSESSMENT:  41M s/p radical prostatectomy w pelvic LN dissection, c/b intraop hemorrhage, now w concern for seizure v. agitation v. ETOH withdrawal but waking up and following commands as of 6/7    Neurologic: h/o seizures  - Sedated on fentanyl, precedex , and propofol gtt  - Ativan PRN agitation  - Keppra 1000 mg BID  - Neurology following  - Video EEG, preliminary no seizure activity  - CTA head/neck negative     Respiratory  - Intubated Volume A/C: 14/440/5/40%  - Attempt weaning sedation in the AM as pt. at high risk for self-extubation  - Monitor ABGs  - F/u XR    Cardiovascular: h/o HTN  - C/w metoprolol 5mg q6hr    Gastrointestinal/Nutrition:  - NGT for vomiting  - NPO  - Consider re-starting feeds or placing on maintenance fluids in the AM    Renal/Genitourinary: s/p radical prostatectomy  - Follow I/Os  - Trend CR, electrolytes    Hematologic: no acute issues  - Trend CBC  - DVT ppx: Lovenox    Infectious Disease  - Trend WBC count, fever curve    Tubes/Lines/Drains:  -L radial a-line  -PIVs  -White catheter  -L PANCHO    Endocrine: No acute issues  -Monitor serum glucose on BMP    Disposition: SICU  Critical Care Diagnosis: Seizure disorder  Respiratory insufficiency secondary to inability to wean  Malnutrition  Acute posthemorrhage anemia     ASSESSMENT:  41M s/p radical prostatectomy w pelvic LN dissection, c/b intraop hemorrhage, now w concern for seizure v. agitation v. ETOH withdrawal but waking up and following commands as of 6/7    Neurologic: h/o seizures  - Wean off sedation   - Ativan PRN agitation  - Keppra 750 mg BID  - Neurology following  - Video EEG, preliminary no seizure activity  - CTA head/neck negative (6/5)     Respiratory  - Intubated Volume A/C: 14/440/5/40%  - Attempt weaning sedation in the AM as pt. at high risk for self-extubation  - Monitor ABGs  - F/u XR    Cardiovascular: h/o HTN  - C/w metoprolol 5mg q6hr    Gastrointestinal/Nutrition:  - NGT for vomiting  - NPO    Renal/Genitourinary: s/p radical prostatectomy  - Follow I/Os  - Trend CR, electrolytes    Hematologic: no acute issues  - Trend CBC  - DVT ppx: Lovenox    Infectious Disease  - Trend WBC count, fever curve    Tubes/Lines/Drains:  -L radial a-line  -PIVs  -White catheter  -L PANCHO    Endocrine: No acute issues  -Monitor serum glucose on BMP    Disposition: SICU  Critical Care Diagnosis: Seizure disorder  Respiratory insufficiency secondary to inability to wean  Malnutrition  Acute posthemorrhage anemia

## 2021-06-09 NOTE — PROGRESS NOTE ADULT - SUBJECTIVE AND OBJECTIVE BOX
Subjective  Attempted CPAP trial yesterday, however w/increasing agitation. Pt subsequently restarted on propofol. Emesis overnight, NGT placed. No other overnight events.    Objective    Vital signs  T(F): , Max: 100.4 (06-08-21 @ 12:00)  HR: 87 (06-09-21 @ 10:45)  BP: 109/80 (06-09-21 @ 10:00)  SpO2: 99% (06-09-21 @ 10:45)  Wt(kg): --    Output     06-08 @ 07:01  -  06-09 @ 07:00  --------------------------------------------------------  IN: 1742.7 mL / OUT: 3111 mL / NET: -1368.3 mL    06-09 @ 07:01  -  06-09 @ 11:36  --------------------------------------------------------  IN: 169.8 mL / OUT: 302.5 mL / NET: -132.7 mL      GEN: sedated   PULM: intubated  ABD: soft, nontender, softly distended, midline incision open, granulating, PANCHO SS, NGT output bilious  : lee secured, clear urine     Labs      06-09 @ 00:46    WBC 7.05  / Hct 37.1  / SCr 0.46     06-08 @ 01:10    WBC 8.49  / Hct 38.2  / SCr 0.40     Imaging

## 2021-06-09 NOTE — EEG REPORT - NS EEG TEXT BOX
[facility]  Comprehensive Epilepsy Center  Report of Continuous Video EEG    [facility]: [address], [place] [address], Ph#: [phone]  J: [address] [place] [address], Ph#: [phone]  Office: [address] [place] [address] [address], [place] [address] Ph#: [phone]    Patient Name: KATIA MCCLELLAN    Age: 41 year, : 1980  Patient ID: -, MRN #: -, [place]: [address]  Referring Physician: NELIDA LIEBERMAN  EEG #: [id number]    Study Date: 2021   Start Time: 08:00 End Date:  21        End Time: 08:00  Study Duration: 24 H    Study Information:    EEG Recording Technique:  The patient underwent continuous Video-EEG monitoring, using Telemetry System hardware on the XLTek Digital System. EEG and video data were stored on a computer hard drive with important events saved in digital archive files. The material was reviewed by a physician (electroencephalographer / epileptologist) on a daily basis. Osiel and seizure detection algorithms were utilized and reviewed. An EEG Technician attended to the patient, and was available throughout daytime work hours.  The epilepsy center neurologist was available in person or on call 24-hours per day.    EEG Placement and Labeling of Electrodes:  The EEG was performed utilizing 20 channel referential EEG connections (coronal over temporal over parasagittal montage) using all standard 10-20 electrode placements with EKG, with additional electrodes placed in the inferior temporal region using the modified 10-10 montage electrode placements for elective admissions, or if deemed necessary. Recording was at a sampling rate of 256 samples per second per channel. Time synchronized digital video recording was done simultaneously with EEG recording. A low light infrared camera was used for low light recording.     History:   VEEG AT BEDSIDE SICU-05  41 YEAR OLD MALE   COR: INTUBATED/VENT  P/W: MALIGNANT NEOPLASM  PMH: HTN, ASTHMA, SZ, PROSTATE CA  HV: NOT COMPLETED DUE TO PANDEMIC  PHOTIC: NOT COMPLETED  A&OX 0  CONCERN FOR SEIZURES    Medication  Dilantin (Phenytoin)  Sublamize  Versed  PROTONIX  Keppra (Levetiracetam)  Potassium  Magnesium  Sodium Chloride  PERIDEX  Hibiclens  CEFAZOLIN  Lovenox    Interpretation:    [Abbreviation Key:  PDR=alpha rhythm/posterior dominant rhythm. A-P=anterior posterior gradient.  Amplitude: ‘very low’:<20; ‘low’:20-50; ‘medium’:; ‘high’:>200uV.  Persistence for periodic/rhythmic patterns (% of epoch) ‘rare’:<1%; ‘occasional’:1-10%; ‘frequent’:10-50%; ‘abundant’:50-90%; ‘continuous’:>90%.  Persistence for sporadic discharges: ‘rare’:<1/hr; ‘occasional’:1/min-1/hr; ‘frequent’:>1/min; ‘abundant’:>1/10 sec.  GRDA=generalized rhythmic delta activity, LRDA=lateralized rhythmic delta activity, TIRDA=temporal intermittent rhythmic delta activity, FIRDA=frontal intermittent rhythmic activity. LPD=PLED=lateralized periodic discharges, GPD=generalized periodic discharges, BiPDs=BiPLEDs=bilateral independent periodic epileptiform discharges, SIRPID=stimulus induced rhythmic, periodic, or ictal appearing discharges.  Modifiers: +F=with fast component, +S=with spike component, +R=with rhythmic component.  S-B=burst suppression pattern.  PFA: paroxysmal fast activity. Max=maximal. N1-drowsy, N2-stage II sleep, N3-slow wave sleep.  HV=hyperventilation, PS=photic stimulation]    Daily EEG Visual Analysis  FINDINGS: The background was continuous, spontaneously variable and reactive. During wakefulness, the posterior dominant rhythm was to  7-7.5hz activity 30uV.    Background Slowing:  Generalized slowing: intermittent polymorphic theta/delta activity  Focal Slowing: None was present.    Sleep Background:  Drowsiness was observed with relative attenuation of the background and intermittent central maximal theta activity.  Symmetrical stage II sleep transients were recorded consisting of spindles, and K-complexes.    Other Non-Epileptiform Findings:  None were present.    Interictal Epileptiform Activity:   None were present.    Events:  Seizures: None recorded.    Activation Procedures:   Hyperventilation was not performed.    Photic stimulation was not performed.    Artifacts:  F3 P7 confirmed as swapped per technician    ECG:  The heart rate on single channel ECG was predominantly between 70-90 BPM.    EEG Summary / Classification:  Abnormal EEG in the sedated patient.  -mild generalized slowing    EEG Impression / Clinical Correlate:  Findings are consistent with mild diffuse nonspecific cerebral dysfunction  No seizures or epileptiform activities recorded.        Mitch River MD FAES  Director, Continuous EEG Monitoring Program, [facility]   and Epilepsy Fellowship ,   Department of Neurology, [employer]    [facility] EEG Reading Room Ph#: [phone]  Epilepsy Answering Service after 5PM and before 8:30AM: Ph#: [phone]

## 2021-06-09 NOTE — PROGRESS NOTE ADULT - ASSESSMENT
41y Male w/ recently diagnosed prostate cancer s/p radical prostatectomy.  Operative course c/b 5 units blood loss, s/p 12 units pRBC, 7 units FFP, 3 units platelets, 14L crystalloids, and 1L albumin, admitted to the SICU intubated and for hemorrhage watch.    6/4: Sicu for hemodynamic monitoring H/H stable   6/5: Seizures, Neuro consulted, CTA CTH  6/6: seizures today, EEG in progress  6/7: Continues to have seizure activity, EEG in progress  6/8: No eleptiform activity on EEG, Febrile to 100.7 F overnight, remains intubated and sedated, TF at goal  6/8: Unsuccessful attempt to wean ventilator TF held given emesis, NGT placed.     - Intubated and sedated, wean sedation/SBT per SICU  - NPO, monitor NGT output  - replete electrolytes prn   - Trend Cr and I/Os  - Trend H/H, stable   - Continue lee catheter   - f/up neuro, rec: brain MRI

## 2021-06-09 NOTE — PROGRESS NOTE ADULT - ATTENDING COMMENTS
I agree with the history, physical, and plan, which I have reviewed and edited where appropriate.  I agree with notes/assessment and detailed interval history of the health care providers on my service.  The patient is in SICU with diagnosis mentioned in the note.    The patient is a critical care patient with life threatening hemodynamic and metabolic instability in SICU.  The SICU team has a constant risk benefit analyzes discussion and coordinating care with the primary team, all consultants, House Staff and PA's..  I was physically present for the key portions of the evaluation and management (E/M) service provided.    The documentation of the total time spent 62 minutes  41M s/p radical prostatectomy, sp ETOH withdrawal in respiratory faliure.  I have personally examined the patient, reviewed data and laboratory tests/x-rays and all pertinent electronic images.  NEURO:  following simple commands  RESPIRATORY  RR: 14   SpO2: 98%   Mechanical Ventilation: Mode: AC/ CMV   CARDIOVASCULAR  HR: 76   BP: 155/101   GI/NUTRITION  Diet: NPO   The plan is specified below:  Neurologic:   - Keppra 1000 mg BID  -  Respiratory  - Intubated Volume A/C: 14/440/5/40%    Cardiovascular:   - C/w metoprolol     Gastrointestinal/Nutrition:  - NPO    Renal/Genitourinary:  - Trend CR, electrolytes    Hematologic:     - DVT ppx: Lovenox      Endocrine: No acute issues  -Monitor serum glucose on BMP    Critical Care Diagnosis: Seizure disorder  Respiratory insufficiency secondary to inability to wean  Malnutrition  Acute posthemorrhage anemia

## 2021-06-10 LAB
ANION GAP SERPL CALC-SCNC: 20 MMOL/L — HIGH (ref 7–14)
BLOOD GAS ARTERIAL - LYTES,HGB,ICA,LACT RESULT: SIGNIFICANT CHANGE UP
BUN SERPL-MCNC: 8 MG/DL — SIGNIFICANT CHANGE UP (ref 7–23)
CALCIUM SERPL-MCNC: 8.5 MG/DL — SIGNIFICANT CHANGE UP (ref 8.4–10.5)
CHLORIDE SERPL-SCNC: 101 MMOL/L — SIGNIFICANT CHANGE UP (ref 98–107)
CO2 SERPL-SCNC: 15 MMOL/L — LOW (ref 22–31)
CREAT SERPL-MCNC: 0.48 MG/DL — LOW (ref 0.5–1.3)
GLUCOSE SERPL-MCNC: 85 MG/DL — SIGNIFICANT CHANGE UP (ref 70–99)
HCT VFR BLD CALC: 38.8 % — LOW (ref 39–50)
HGB BLD-MCNC: 12.3 G/DL — LOW (ref 13–17)
MAGNESIUM SERPL-MCNC: 1.5 MG/DL — LOW (ref 1.6–2.6)
MCHC RBC-ENTMCNC: 28.2 PG — SIGNIFICANT CHANGE UP (ref 27–34)
MCHC RBC-ENTMCNC: 31.7 GM/DL — LOW (ref 32–36)
MCV RBC AUTO: 89 FL — SIGNIFICANT CHANGE UP (ref 80–100)
NRBC # BLD: 0 /100 WBCS — SIGNIFICANT CHANGE UP
NRBC # FLD: 0 K/UL — SIGNIFICANT CHANGE UP
PHOSPHATE SERPL-MCNC: 2.7 MG/DL — SIGNIFICANT CHANGE UP (ref 2.5–4.5)
PLATELET # BLD AUTO: 239 K/UL — SIGNIFICANT CHANGE UP (ref 150–400)
POTASSIUM SERPL-MCNC: 3.6 MMOL/L — SIGNIFICANT CHANGE UP (ref 3.5–5.3)
POTASSIUM SERPL-SCNC: 3.6 MMOL/L — SIGNIFICANT CHANGE UP (ref 3.5–5.3)
RBC # BLD: 4.36 M/UL — SIGNIFICANT CHANGE UP (ref 4.2–5.8)
RBC # FLD: 14.5 % — SIGNIFICANT CHANGE UP (ref 10.3–14.5)
SODIUM SERPL-SCNC: 136 MMOL/L — SIGNIFICANT CHANGE UP (ref 135–145)
WBC # BLD: 7.89 K/UL — SIGNIFICANT CHANGE UP (ref 3.8–10.5)
WBC # FLD AUTO: 7.89 K/UL — SIGNIFICANT CHANGE UP (ref 3.8–10.5)

## 2021-06-10 PROCEDURE — 99232 SBSQ HOSP IP/OBS MODERATE 35: CPT

## 2021-06-10 PROCEDURE — 71045 X-RAY EXAM CHEST 1 VIEW: CPT | Mod: 26

## 2021-06-10 RX ORDER — ACETAMINOPHEN 500 MG
1000 TABLET ORAL ONCE
Refills: 0 | Status: COMPLETED | OUTPATIENT
Start: 2021-06-10 | End: 2021-06-10

## 2021-06-10 RX ORDER — ALBUTEROL 90 UG/1
2 AEROSOL, METERED ORAL EVERY 6 HOURS
Refills: 0 | Status: DISCONTINUED | OUTPATIENT
Start: 2021-06-10 | End: 2021-06-12

## 2021-06-10 RX ORDER — HYDRALAZINE HCL 50 MG
10 TABLET ORAL ONCE
Refills: 0 | Status: COMPLETED | OUTPATIENT
Start: 2021-06-10 | End: 2021-06-10

## 2021-06-10 RX ORDER — ONDANSETRON 8 MG/1
8 TABLET, FILM COATED ORAL EVERY 6 HOURS
Refills: 0 | Status: DISCONTINUED | OUTPATIENT
Start: 2021-06-10 | End: 2021-06-10

## 2021-06-10 RX ORDER — DEXTROSE MONOHYDRATE, SODIUM CHLORIDE, AND POTASSIUM CHLORIDE 50; .745; 4.5 G/1000ML; G/1000ML; G/1000ML
1000 INJECTION, SOLUTION INTRAVENOUS
Refills: 0 | Status: DISCONTINUED | OUTPATIENT
Start: 2021-06-10 | End: 2021-06-12

## 2021-06-10 RX ORDER — HYDROMORPHONE HYDROCHLORIDE 2 MG/ML
0.5 INJECTION INTRAMUSCULAR; INTRAVENOUS; SUBCUTANEOUS ONCE
Refills: 0 | Status: DISCONTINUED | OUTPATIENT
Start: 2021-06-10 | End: 2021-06-10

## 2021-06-10 RX ORDER — METOPROLOL TARTRATE 50 MG
100 TABLET ORAL
Refills: 0 | Status: DISCONTINUED | OUTPATIENT
Start: 2021-06-10 | End: 2021-06-10

## 2021-06-10 RX ORDER — ONDANSETRON 8 MG/1
8 TABLET, FILM COATED ORAL EVERY 8 HOURS
Refills: 0 | Status: DISCONTINUED | OUTPATIENT
Start: 2021-06-10 | End: 2021-06-12

## 2021-06-10 RX ORDER — SODIUM CHLORIDE 9 MG/ML
500 INJECTION, SOLUTION INTRAVENOUS ONCE
Refills: 0 | Status: COMPLETED | OUTPATIENT
Start: 2021-06-10 | End: 2021-06-10

## 2021-06-10 RX ORDER — MAGNESIUM SULFATE 500 MG/ML
2 VIAL (ML) INJECTION ONCE
Refills: 0 | Status: COMPLETED | OUTPATIENT
Start: 2021-06-10 | End: 2021-06-10

## 2021-06-10 RX ORDER — METOPROLOL TARTRATE 50 MG
10 TABLET ORAL EVERY 6 HOURS
Refills: 0 | Status: DISCONTINUED | OUTPATIENT
Start: 2021-06-10 | End: 2021-06-11

## 2021-06-10 RX ORDER — HYDROMORPHONE HYDROCHLORIDE 2 MG/ML
0.5 INJECTION INTRAMUSCULAR; INTRAVENOUS; SUBCUTANEOUS EVERY 4 HOURS
Refills: 0 | Status: DISCONTINUED | OUTPATIENT
Start: 2021-06-10 | End: 2021-06-12

## 2021-06-10 RX ORDER — SODIUM CHLORIDE 9 MG/ML
1000 INJECTION, SOLUTION INTRAVENOUS ONCE
Refills: 0 | Status: COMPLETED | OUTPATIENT
Start: 2021-06-10 | End: 2021-06-10

## 2021-06-10 RX ADMIN — ENOXAPARIN SODIUM 40 MILLIGRAM(S): 100 INJECTION SUBCUTANEOUS at 12:32

## 2021-06-10 RX ADMIN — SODIUM CHLORIDE 1000 MILLILITER(S): 9 INJECTION, SOLUTION INTRAVENOUS at 22:52

## 2021-06-10 RX ADMIN — Medication 10 MILLIGRAM(S): at 20:29

## 2021-06-10 RX ADMIN — HYDROMORPHONE HYDROCHLORIDE 0.5 MILLIGRAM(S): 2 INJECTION INTRAMUSCULAR; INTRAVENOUS; SUBCUTANEOUS at 23:53

## 2021-06-10 RX ADMIN — Medication 50 GRAM(S): at 03:30

## 2021-06-10 RX ADMIN — PANTOPRAZOLE SODIUM 40 MILLIGRAM(S): 20 TABLET, DELAYED RELEASE ORAL at 12:32

## 2021-06-10 RX ADMIN — ONDANSETRON 8 MILLIGRAM(S): 8 TABLET, FILM COATED ORAL at 22:54

## 2021-06-10 RX ADMIN — HYDROMORPHONE HYDROCHLORIDE 0.5 MILLIGRAM(S): 2 INJECTION INTRAMUSCULAR; INTRAVENOUS; SUBCUTANEOUS at 12:00

## 2021-06-10 RX ADMIN — HYDROMORPHONE HYDROCHLORIDE 0.5 MILLIGRAM(S): 2 INJECTION INTRAMUSCULAR; INTRAVENOUS; SUBCUTANEOUS at 20:29

## 2021-06-10 RX ADMIN — Medication 100 MILLIGRAM(S): at 12:32

## 2021-06-10 RX ADMIN — Medication 400 MILLIGRAM(S): at 08:00

## 2021-06-10 RX ADMIN — Medication 10 MILLIGRAM(S): at 13:28

## 2021-06-10 RX ADMIN — SODIUM CHLORIDE 1000 MILLILITER(S): 9 INJECTION, SOLUTION INTRAVENOUS at 08:00

## 2021-06-10 RX ADMIN — LEVETIRACETAM 400 MILLIGRAM(S): 250 TABLET, FILM COATED ORAL at 17:55

## 2021-06-10 RX ADMIN — Medication 10 MILLIGRAM(S): at 00:10

## 2021-06-10 RX ADMIN — LEVETIRACETAM 400 MILLIGRAM(S): 250 TABLET, FILM COATED ORAL at 06:12

## 2021-06-10 RX ADMIN — HYDROMORPHONE HYDROCHLORIDE 0.5 MILLIGRAM(S): 2 INJECTION INTRAMUSCULAR; INTRAVENOUS; SUBCUTANEOUS at 12:15

## 2021-06-10 RX ADMIN — DEXTROSE MONOHYDRATE, SODIUM CHLORIDE, AND POTASSIUM CHLORIDE 50 MILLILITER(S): 50; .745; 4.5 INJECTION, SOLUTION INTRAVENOUS at 02:46

## 2021-06-10 RX ADMIN — DEXTROSE MONOHYDRATE, SODIUM CHLORIDE, AND POTASSIUM CHLORIDE 50 MILLILITER(S): 50; .745; 4.5 INJECTION, SOLUTION INTRAVENOUS at 09:26

## 2021-06-10 RX ADMIN — HYDROMORPHONE HYDROCHLORIDE 0.5 MILLIGRAM(S): 2 INJECTION INTRAMUSCULAR; INTRAVENOUS; SUBCUTANEOUS at 16:00

## 2021-06-10 RX ADMIN — Medication 1000 MILLIGRAM(S): at 08:30

## 2021-06-10 RX ADMIN — Medication 50 GRAM(S): at 02:45

## 2021-06-10 RX ADMIN — Medication 400 MILLIGRAM(S): at 01:04

## 2021-06-10 RX ADMIN — Medication 120 MILLIGRAM(S): at 04:00

## 2021-06-10 RX ADMIN — Medication 10 MILLIGRAM(S): at 07:13

## 2021-06-10 RX ADMIN — HYDROMORPHONE HYDROCHLORIDE 0.5 MILLIGRAM(S): 2 INJECTION INTRAMUSCULAR; INTRAVENOUS; SUBCUTANEOUS at 16:15

## 2021-06-10 NOTE — PROGRESS NOTE ADULT - ASSESSMENT
ASSESSMENT:  41M s/p radical prostatectomy w pelvic LN dissection, c/b intraop hemorrhage, course c/b agitation v. ETOH withdraw respiratory failure, now extubated.     Plan:    Neurologic: h/o seizures  - Keppra 750 mg BID  - Neurology following  - Pain control PRN     Respiratory  - Monitor SpO2, wean supplemental O2 as tolerated  - F/U CXR     Cardiovascular: h/o HTN  - C/w metoprolol 10mg q6hr  - Monitor hemodynamics     Gastrointestinal/Nutrition:  - NPO for now / NGT to LCWS   - Thiamine 100 mg IVP qd   - Protonix 40 mg IVP qd    Renal/Genitourinary: s/p radical prostatectomy  - Follow I/Os  - Trend CR, electrolytes  - IVF: D5 1/2 NS @ 50cc/hr    Hematologic: no acute issues  - Trend CBC  - DVT ppx: Lovenox    Infectious Disease  - Trend WBC count, fever curve    Tubes/Lines/Drains:  - L radial a-line  - PIVs  - White catheter  - L PANCHO    Endocrine: No acute issues  - Monitor serum glucose on BMP    Disposition: SICU

## 2021-06-10 NOTE — PROGRESS NOTE ADULT - ASSESSMENT
41y Male w/ recently diagnosed prostate cancer s/p radical prostatectomy.  Operative course c/b 5 units blood loss, s/p 12 units pRBC, 7 units FFP, 3 units platelets, 14L crystalloids, and 1L albumin, admitted to the SICU intubated and for hemorrhage watch.    6/4: Sicu for hemodynamic monitoring H/H stable   6/5: Seizures, Neuro consulted, CTA CTH  6/6: seizures today, EEG in progress  6/7: Continues to have seizure activity, EEG in progress  6/8: No eleptiform activity on EEG, Febrile to 100.7 F overnight, remains intubated and sedated, TF at goal  6/8: Unsuccessful attempt to wean ventilator TF held given emesis, NGT placed.   6/9: extubated   6/10: NGT with high output. Tmax 100.4 F     - Extubated, sating well on RA  - If spikes fever, send cultures   - NPO, monitor NGT output  - replete electrolytes prn   - Trend Cr and I/Os  - Trend H/H, stable   - Continue lee catheter   - f/up neuro, rec: brain MRI

## 2021-06-10 NOTE — PROGRESS NOTE ADULT - ATTENDING COMMENTS
Awake and alert.  +Flatus  Mild abdominal pain.  NGT in place  White urine clear.  PANCHO is serosanginous

## 2021-06-10 NOTE — PROGRESS NOTE ADULT - SUBJECTIVE AND OBJECTIVE BOX
SURGICAL INTENSIVE CARE UNIT DAILY PROGRESS NOTE    24 HOUR EVENTS:   - Extubated   - Keppra decreased to 750 mg BID   - Thiamine started   - Metoprolol increased 10mg q6hr  - Required cardizem 10mg IVP x1 overnight  -     HPI: 41M PMH seizure disorder, h/o prostate cancer, now s/p radical retropubic prostatectomy, pelvic lymph node dissection c/b intraoperative hemorrhage, post-operative course c/b SICU agitation/possible ETOH withdrawal, respiratory failure, now extubated.     NEURO  - Awake, alert, oriented  - Follows commands, moves all extremities    RESPIRATORY  RR: 24 (06-10-21 @ 01:00) (12 - 30)  SpO2: 94% (06-10-21 @ 01:00) (93% - 100%)  ABG - ( 08 Jun 2021 14:20 )  pH: x     /  pCO2: 42    /  pO2: 138   / HCO3: 24    / Base Excess: -0.8  /  SaO2: 98.9    Lactate: x      - No increased work of breathing     CARDIOVASCULAR  HR: 113 (06-10-21 @ 01:00) (66 - 113)  BP: 137/88 (06-10-21 @ 01:00) (104/69 - 154/92)  BP(mean): 103 (06-10-21 @ 01:00) (80 - 126)  ABP: 163/79 (06-10-21 @ 01:00) (109/64 - 175/93)  ABP(mean): 106 (06-10-21 @ 01:00) (80 - 125)  - Hemodynamically stable     GI/NUTRITION  Diet: NPO / NGT    GENITOURINARY  I&O's Detail    06-08 @ 07:01  -  06-09 @ 07:00  --------------------------------------------------------  IN:    Dexmedetomidine: 335.7 mL    FentaNYL: 142.1 mL    IV PiggyBack: 750 mL    Pivot 1.5: 350 mL    Propofol: 164.9 mL  Total IN: 1742.7 mL    OUT:    Bulb (mL): 6 mL    Emesis (mL): 360 mL    Indwelling Catheter - Urethral (mL): 1645 mL    Nasogastric/Oral tube (mL): 1100 mL  Total OUT: 3111 mL    Total NET: -1368.3 mL      06-09 @ 07:01  -  06-10 @ 02:21  --------------------------------------------------------  IN:    Dexmedetomidine: 34.9 mL    FentaNYL: 17.4 mL    IV PiggyBack: 400 mL    Propofol: 17.5 mL  Total IN: 469.8 mL    OUT:    Bulb (mL): 5 mL    Indwelling Catheter - Urethral (mL): 960 mL    Nasogastric/Oral tube (mL): 600 mL  Total OUT: 1565 mL    Total NET: -1095.2 mL      06-10    136  |  101  |  8   ----------------------------<  85  3.6   |  15<L>  |  0.48<L>    Ca    8.5      10 Chato 2021 01:00  Phos  2.7     06-10  Mg     1.5     06-10      HEMATOLOGIC                        12.3   7.89  )-----------( 239      ( 10 Chato 2021 01:00 )             38.8         INFECTIOUS DISEASES  RECENT CULTURES:

## 2021-06-10 NOTE — PROGRESS NOTE ADULT - SUBJECTIVE AND OBJECTIVE BOX
Interval events: extubated. NGT output 1000 cc overnight    SUBJECTIVE:  Patient is awake and alert.       OBJECTIVE:  Vital Signs Last 24 Hrs  T(C): 37.4 (10 Chato 2021 04:00), Max: 38 (10 Chato 2021 00:00)  T(F): 99.4 (10 Chato 2021 04:00), Max: 100.4 (10 Chato 2021 00:00)  HR: 104 (10 Chato 2021 06:00) (86 - 113)  BP: 139/126 (10 Chato 2021 06:00) (104/69 - 154/110)  BP(mean): 129 (10 Chato 2021 06:00) (80 - 129)  RR: 21 (10 Chato 2021 06:00) (12 - 30)  SpO2: 98% (10 Chato 2021 06:00) (93% - 100%)    Physical Examination:  GEN: NAD, resting quietly  PULM: extubated   ABD: soft, nontender, nondistended, incision c/d/i, PANCHO SS  : lee secured with clear urine       LABS:                        12.3   7.89  )-----------( 239      ( 10 Chato 2021 01:00 )             38.8       06-10    136  |  101  |  8   ----------------------------<  85  3.6   |  15<L>  |  0.48<L>    Ca    8.5      10 Chato 2021 01:00  Phos  2.7     06-10  Mg     1.5     06-10

## 2021-06-10 NOTE — PROGRESS NOTE ADULT - ATTENDING COMMENTS
I agree with the above history, physical, and plan, which I have reviewed and edited where appropriate.  I agree with notes/assessment and detailed interval history of the health care providers on my service.  The patient is in SICU with diagnosis mentioned in the note.    The SICU team has a constant risk benefit analyzes discussion and coordinating care with the primary team, all consultants, House Staff and PA's.  I was physically present for the key portions of the evaluation and management (E/M) service provided.  41M s/p radical prostatectomy sp intraop hemorrhage and ETOH sp respiratory failure.  I have personally examined the patient, reviewed data and laboratory tests/x-rays and all pertinent electronic images.    NEURO  - Awake, alert, oriented  RESPIRATORY  RR: 24   SpO2: 94%   clear  CARDIOVASCULAR  HR: 113   BP: 137/88   GI/NUTRITION  Diet: NPO / NGT  soft nt nd    The plan is specified below:    Neurologic:   - Keppra 750 mg BID  Respiratory  - Monitor SpO2,   CARDIO  - C/w metoprolol 10mg q6hr  Gastrointestinal/Nutrition:  - Protonix 40 mg IVP qd  Renal/Genitourinary:   - IVF: D5 1/2 NS @ 50cc/hr  Hematologic: no acute issues  Lovenox  Disposition: SICU

## 2021-06-10 NOTE — EEG REPORT - NS EEG TEXT BOX
[facility]  Comprehensive Epilepsy Center  Report of Continuous Video EEG    [facility]: [address], [place] [address], Ph#: [phone]  [facility]: [address] [place] [id number], Ph#: [phone]  Office: [address] [place] [address] [address], [place] [address] Ph#: [phone]    Patient Name: KATIA MCCLELLAN    Age: 41 year, : 1980  Patient ID: -, MRN #: -, [place]  Referring Physician: NELIDA LIEBERMAN  EEG #: [id number]    Study Date: 2021   Start Time: 08:00 End Date:  21        End Time: 10:42  Study Duration: 2:42 H    Study Information:    EEG Recording Technique:  The patient underwent continuous Video-EEG monitoring, using Telemetry System hardware on the XLTek Digital System. EEG and video data were stored on a computer hard drive with important events saved in digital archive files. The material was reviewed by a physician (electroencephalographer / epileptologist) on a daily basis. Osiel and seizure detection algorithms were utilized and reviewed. An EEG Technician attended to the patient, and was available throughout daytime work hours.  The epilepsy center neurologist was available in person or on call 24-hours per day.    EEG Placement and Labeling of Electrodes:  The EEG was performed utilizing 20 channel referential EEG connections (coronal over temporal over parasagittal montage) using all standard 10-20 electrode placements with EKG, with additional electrodes placed in the inferior temporal region using the modified 10-10 montage electrode placements for elective admissions, or if deemed necessary. Recording was at a sampling rate of 256 samples per second per channel. Time synchronized digital video recording was done simultaneously with EEG recording. A low light infrared camera was used for low light recording.     History:   VEEG AT BEDSIDE SICU-05  41 YEAR OLD MALE   COR: INTUBATED/VENT  P/W: MALIGNANT NEOPLASM  PMH: HTN, ASTHMA, SZ, PROSTATE CA  HV: NOT COMPLETED DUE TO PANDEMIC  PHOTIC: NOT COMPLETED  A&OX 0  CONCERN FOR SEIZURES    Medication  Dilantin (Phenytoin)  Sublamize  Versed  PROTONIX  Keppra (Levetiracetam)  Potassium  Magnesium  Sodium Chloride  PERIDEX  Hibiclens  CEFAZOLIN  Lovenox    Interpretation:    [Abbreviation Key:  PDR=alpha rhythm/posterior dominant rhythm. A-P=anterior posterior gradient.  Amplitude: ‘very low’:<20; ‘low’:20-50; ‘medium’:; ‘high’:>200uV.  Persistence for periodic/rhythmic patterns (% of epoch) ‘rare’:<1%; ‘occasional’:1-10%; ‘frequent’:10-50%; ‘abundant’:50-90%; ‘continuous’:>90%.  Persistence for sporadic discharges: ‘rare’:<1/hr; ‘occasional’:1/min-1/hr; ‘frequent’:>1/min; ‘abundant’:>1/10 sec.  GRDA=generalized rhythmic delta activity, LRDA=lateralized rhythmic delta activity, TIRDA=temporal intermittent rhythmic delta activity, FIRDA=frontal intermittent rhythmic activity. LPD=PLED=lateralized periodic discharges, GPD=generalized periodic discharges, BiPDs=BiPLEDs=bilateral independent periodic epileptiform discharges, SIRPID=stimulus induced rhythmic, periodic, or ictal appearing discharges.  Modifiers: +F=with fast component, +S=with spike component, +R=with rhythmic component.  S-B=burst suppression pattern.  PFA: paroxysmal fast activity. Max=maximal. N1-drowsy, N2-stage II sleep, N3-slow wave sleep.  HV=hyperventilation, PS=photic stimulation]    Daily EEG Visual Analysis  FINDINGS: The background was continuous, spontaneously variable and reactive. During wakefulness, the posterior dominant rhythm was to  7-7.5hz activity 30uV.    Background Slowing:  Generalized slowing: intermittent polymorphic theta/delta activity  Focal Slowing: None was present.    Sleep Background:  Drowsiness was observed with relative attenuation of the background and intermittent central maximal theta activity.  Symmetrical stage II sleep transients were recorded consisting of spindles, and K-complexes.    Other Non-Epileptiform Findings:  None were present.    Interictal Epileptiform Activity:   None were present.    Events:  Seizures: None recorded.    Activation Procedures:   Hyperventilation was not performed.    Photic stimulation was not performed.    Artifacts:  F3 P7 confirmed as swapped per technician    ECG:  The heart rate on single channel ECG was predominantly between 70-90 BPM.    EEG Summary / Classification:  Abnormal EEG in the sedated patient.  -mild generalized slowing    EEG Impression / Clinical Correlate:  Findings are consistent with mild diffuse nonspecific cerebral dysfunction  No seizures or epileptiform activities recorded.        Mitch River MD FAES  Director, Continuous EEG Monitoring Program, [facility]   and Epilepsy Fellowship ,   Department of Neurology, [facility] EEG Reading Room Ph#: [phone]  Epilepsy Answering Service after 5PM and before 8:30AM: Ph#: [phone]

## 2021-06-11 LAB
ANION GAP SERPL CALC-SCNC: 12 MMOL/L — SIGNIFICANT CHANGE UP (ref 7–14)
ANION GAP SERPL CALC-SCNC: 13 MMOL/L — SIGNIFICANT CHANGE UP (ref 7–14)
BASOPHILS # BLD AUTO: 0 K/UL — SIGNIFICANT CHANGE UP (ref 0–0.2)
BASOPHILS NFR BLD AUTO: 0 % — SIGNIFICANT CHANGE UP (ref 0–2)
BUN SERPL-MCNC: 7 MG/DL — SIGNIFICANT CHANGE UP (ref 7–23)
BUN SERPL-MCNC: 8 MG/DL — SIGNIFICANT CHANGE UP (ref 7–23)
CALCIUM SERPL-MCNC: 8.6 MG/DL — SIGNIFICANT CHANGE UP (ref 8.4–10.5)
CALCIUM SERPL-MCNC: 8.9 MG/DL — SIGNIFICANT CHANGE UP (ref 8.4–10.5)
CHLORIDE SERPL-SCNC: 105 MMOL/L — SIGNIFICANT CHANGE UP (ref 98–107)
CHLORIDE SERPL-SCNC: 107 MMOL/L — SIGNIFICANT CHANGE UP (ref 98–107)
CO2 SERPL-SCNC: 23 MMOL/L — SIGNIFICANT CHANGE UP (ref 22–31)
CO2 SERPL-SCNC: 25 MMOL/L — SIGNIFICANT CHANGE UP (ref 22–31)
CREAT SERPL-MCNC: 0.4 MG/DL — LOW (ref 0.5–1.3)
CREAT SERPL-MCNC: 0.45 MG/DL — LOW (ref 0.5–1.3)
EOSINOPHIL # BLD AUTO: 0.08 K/UL — SIGNIFICANT CHANGE UP (ref 0–0.5)
EOSINOPHIL NFR BLD AUTO: 0.9 % — SIGNIFICANT CHANGE UP (ref 0–6)
GLUCOSE SERPL-MCNC: 122 MG/DL — HIGH (ref 70–99)
GLUCOSE SERPL-MCNC: 126 MG/DL — HIGH (ref 70–99)
HCT VFR BLD CALC: 38.7 % — LOW (ref 39–50)
HGB BLD-MCNC: 12.6 G/DL — LOW (ref 13–17)
IANC: 6.08 K/UL — SIGNIFICANT CHANGE UP (ref 1.5–8.5)
LYMPHOCYTES # BLD AUTO: 0.24 K/UL — LOW (ref 1–3.3)
LYMPHOCYTES # BLD AUTO: 2.7 % — LOW (ref 13–44)
MAGNESIUM SERPL-MCNC: 1.4 MG/DL — LOW (ref 1.6–2.6)
MAGNESIUM SERPL-MCNC: 1.8 MG/DL — SIGNIFICANT CHANGE UP (ref 1.6–2.6)
MCHC RBC-ENTMCNC: 28.3 PG — SIGNIFICANT CHANGE UP (ref 27–34)
MCHC RBC-ENTMCNC: 32.6 GM/DL — SIGNIFICANT CHANGE UP (ref 32–36)
MCV RBC AUTO: 86.8 FL — SIGNIFICANT CHANGE UP (ref 80–100)
MONOCYTES # BLD AUTO: 2.03 K/UL — HIGH (ref 0–0.9)
MONOCYTES NFR BLD AUTO: 22.8 % — HIGH (ref 2–14)
NEUTROPHILS # BLD AUTO: 6.31 K/UL — SIGNIFICANT CHANGE UP (ref 1.8–7.4)
NEUTROPHILS NFR BLD AUTO: 68.4 % — SIGNIFICANT CHANGE UP (ref 43–77)
PHOSPHATE SERPL-MCNC: 1.9 MG/DL — LOW (ref 2.5–4.5)
PHOSPHATE SERPL-MCNC: 2.6 MG/DL — SIGNIFICANT CHANGE UP (ref 2.5–4.5)
PLATELET # BLD AUTO: 314 K/UL — SIGNIFICANT CHANGE UP (ref 150–400)
POTASSIUM SERPL-MCNC: 2.9 MMOL/L — CRITICAL LOW (ref 3.5–5.3)
POTASSIUM SERPL-MCNC: 3.2 MMOL/L — LOW (ref 3.5–5.3)
POTASSIUM SERPL-SCNC: 2.9 MMOL/L — CRITICAL LOW (ref 3.5–5.3)
POTASSIUM SERPL-SCNC: 3.2 MMOL/L — LOW (ref 3.5–5.3)
RBC # BLD: 4.46 M/UL — SIGNIFICANT CHANGE UP (ref 4.2–5.8)
RBC # FLD: 14.5 % — SIGNIFICANT CHANGE UP (ref 10.3–14.5)
SODIUM SERPL-SCNC: 141 MMOL/L — SIGNIFICANT CHANGE UP (ref 135–145)
SODIUM SERPL-SCNC: 144 MMOL/L — SIGNIFICANT CHANGE UP (ref 135–145)
SURGICAL PATHOLOGY STUDY: SIGNIFICANT CHANGE UP
WBC # BLD: 8.89 K/UL — SIGNIFICANT CHANGE UP (ref 3.8–10.5)
WBC # FLD AUTO: 8.89 K/UL — SIGNIFICANT CHANGE UP (ref 3.8–10.5)

## 2021-06-11 PROCEDURE — 99232 SBSQ HOSP IP/OBS MODERATE 35: CPT

## 2021-06-11 PROCEDURE — 71045 X-RAY EXAM CHEST 1 VIEW: CPT | Mod: 26

## 2021-06-11 PROCEDURE — 74018 RADEX ABDOMEN 1 VIEW: CPT | Mod: 26

## 2021-06-11 RX ORDER — BENZOCAINE 10 %
1 GEL (GRAM) MUCOUS MEMBRANE ONCE
Refills: 0 | Status: DISCONTINUED | OUTPATIENT
Start: 2021-06-11 | End: 2021-06-12

## 2021-06-11 RX ORDER — MAGNESIUM SULFATE 500 MG/ML
2 VIAL (ML) INJECTION ONCE
Refills: 0 | Status: COMPLETED | OUTPATIENT
Start: 2021-06-11 | End: 2021-06-11

## 2021-06-11 RX ORDER — SODIUM CHLORIDE 9 MG/ML
600 INJECTION, SOLUTION INTRAVENOUS ONCE
Refills: 0 | Status: COMPLETED | OUTPATIENT
Start: 2021-06-11 | End: 2021-06-11

## 2021-06-11 RX ORDER — ONDANSETRON 8 MG/1
4 TABLET, FILM COATED ORAL ONCE
Refills: 0 | Status: COMPLETED | OUTPATIENT
Start: 2021-06-11 | End: 2021-06-11

## 2021-06-11 RX ORDER — BENZOCAINE AND MENTHOL 5; 1 G/100ML; G/100ML
1 LIQUID ORAL ONCE
Refills: 0 | Status: COMPLETED | OUTPATIENT
Start: 2021-06-11 | End: 2021-06-11

## 2021-06-11 RX ORDER — POTASSIUM PHOSPHATE, MONOBASIC POTASSIUM PHOSPHATE, DIBASIC 236; 224 MG/ML; MG/ML
15 INJECTION, SOLUTION INTRAVENOUS ONCE
Refills: 0 | Status: COMPLETED | OUTPATIENT
Start: 2021-06-11 | End: 2021-06-11

## 2021-06-11 RX ORDER — METOPROLOL TARTRATE 50 MG
10 TABLET ORAL EVERY 6 HOURS
Refills: 0 | Status: DISCONTINUED | OUTPATIENT
Start: 2021-06-11 | End: 2021-06-12

## 2021-06-11 RX ORDER — POTASSIUM CHLORIDE 20 MEQ
10 PACKET (EA) ORAL
Refills: 0 | Status: COMPLETED | OUTPATIENT
Start: 2021-06-11 | End: 2021-06-11

## 2021-06-11 RX ADMIN — HYDROMORPHONE HYDROCHLORIDE 0.5 MILLIGRAM(S): 2 INJECTION INTRAMUSCULAR; INTRAVENOUS; SUBCUTANEOUS at 03:00

## 2021-06-11 RX ADMIN — BENZOCAINE AND MENTHOL 1 LOZENGE: 5; 1 LIQUID ORAL at 01:46

## 2021-06-11 RX ADMIN — SODIUM CHLORIDE 600 MILLILITER(S): 9 INJECTION, SOLUTION INTRAVENOUS at 06:36

## 2021-06-11 RX ADMIN — Medication 120 MILLIGRAM(S): at 20:00

## 2021-06-11 RX ADMIN — DEXTROSE MONOHYDRATE, SODIUM CHLORIDE, AND POTASSIUM CHLORIDE 100 MILLILITER(S): 50; .745; 4.5 INJECTION, SOLUTION INTRAVENOUS at 08:33

## 2021-06-11 RX ADMIN — ENOXAPARIN SODIUM 40 MILLIGRAM(S): 100 INJECTION SUBCUTANEOUS at 19:02

## 2021-06-11 RX ADMIN — Medication 100 MILLIEQUIVALENT(S): at 04:08

## 2021-06-11 RX ADMIN — ONDANSETRON 4 MILLIGRAM(S): 8 TABLET, FILM COATED ORAL at 01:39

## 2021-06-11 RX ADMIN — Medication 120 MILLIGRAM(S): at 15:01

## 2021-06-11 RX ADMIN — Medication 10 MILLIGRAM(S): at 08:17

## 2021-06-11 RX ADMIN — HYDROMORPHONE HYDROCHLORIDE 0.5 MILLIGRAM(S): 2 INJECTION INTRAMUSCULAR; INTRAVENOUS; SUBCUTANEOUS at 08:00

## 2021-06-11 RX ADMIN — HYDROMORPHONE HYDROCHLORIDE 0.5 MILLIGRAM(S): 2 INJECTION INTRAMUSCULAR; INTRAVENOUS; SUBCUTANEOUS at 00:23

## 2021-06-11 RX ADMIN — Medication 100 MILLIGRAM(S): at 12:26

## 2021-06-11 RX ADMIN — DEXTROSE MONOHYDRATE, SODIUM CHLORIDE, AND POTASSIUM CHLORIDE 100 MILLILITER(S): 50; .745; 4.5 INJECTION, SOLUTION INTRAVENOUS at 21:05

## 2021-06-11 RX ADMIN — Medication 100 MILLIEQUIVALENT(S): at 20:00

## 2021-06-11 RX ADMIN — LEVETIRACETAM 400 MILLIGRAM(S): 250 TABLET, FILM COATED ORAL at 17:30

## 2021-06-11 RX ADMIN — HYDROMORPHONE HYDROCHLORIDE 0.5 MILLIGRAM(S): 2 INJECTION INTRAMUSCULAR; INTRAVENOUS; SUBCUTANEOUS at 16:00

## 2021-06-11 RX ADMIN — HYDROMORPHONE HYDROCHLORIDE 0.5 MILLIGRAM(S): 2 INJECTION INTRAMUSCULAR; INTRAVENOUS; SUBCUTANEOUS at 08:15

## 2021-06-11 RX ADMIN — HYDROMORPHONE HYDROCHLORIDE 0.5 MILLIGRAM(S): 2 INJECTION INTRAMUSCULAR; INTRAVENOUS; SUBCUTANEOUS at 12:00

## 2021-06-11 RX ADMIN — Medication 50 GRAM(S): at 18:15

## 2021-06-11 RX ADMIN — Medication 100 MILLIEQUIVALENT(S): at 05:10

## 2021-06-11 RX ADMIN — HYDROMORPHONE HYDROCHLORIDE 0.5 MILLIGRAM(S): 2 INJECTION INTRAMUSCULAR; INTRAVENOUS; SUBCUTANEOUS at 12:15

## 2021-06-11 RX ADMIN — POTASSIUM PHOSPHATE, MONOBASIC POTASSIUM PHOSPHATE, DIBASIC 62.5 MILLIMOLE(S): 236; 224 INJECTION, SOLUTION INTRAVENOUS at 10:42

## 2021-06-11 RX ADMIN — Medication 10 MILLIGRAM(S): at 02:40

## 2021-06-11 RX ADMIN — LEVETIRACETAM 400 MILLIGRAM(S): 250 TABLET, FILM COATED ORAL at 05:10

## 2021-06-11 RX ADMIN — Medication 100 MILLIEQUIVALENT(S): at 06:08

## 2021-06-11 RX ADMIN — HYDROMORPHONE HYDROCHLORIDE 0.5 MILLIGRAM(S): 2 INJECTION INTRAMUSCULAR; INTRAVENOUS; SUBCUTANEOUS at 02:40

## 2021-06-11 RX ADMIN — Medication 100 MILLIEQUIVALENT(S): at 19:03

## 2021-06-11 RX ADMIN — HYDROMORPHONE HYDROCHLORIDE 0.5 MILLIGRAM(S): 2 INJECTION INTRAMUSCULAR; INTRAVENOUS; SUBCUTANEOUS at 16:15

## 2021-06-11 RX ADMIN — Medication 100 MILLIEQUIVALENT(S): at 21:03

## 2021-06-11 RX ADMIN — Medication 50 GRAM(S): at 08:35

## 2021-06-11 NOTE — PROGRESS NOTE ADULT - SUBJECTIVE AND OBJECTIVE BOX
Interval events: No acute events   NGT still with high output.   1/2 to 1 repletions     SUBJECTIVE:  More responsive this morning. Asking for pain medication      OBJECTIVE:  Vital Signs Last 24 Hrs  T(C): 37.3 (11 Jun 2021 04:00), Max: 37.3 (11 Jun 2021 04:00)  T(F): 99.2 (11 Jun 2021 04:00), Max: 99.2 (11 Jun 2021 04:00)  HR: 99 (11 Jun 2021 07:00) (73 - 113)  BP: 161/91 (11 Jun 2021 07:00) (145/88 - 171/105)  BP(mean): 110 (11 Jun 2021 07:00) (101 - 124)  RR: 23 (11 Jun 2021 07:00) (18 - 26)  SpO2: 94% (11 Jun 2021 07:00) (92% - 99%)    Physical Examination:  GEN: NAD, resting quietly  PULM: sats well on RA  ABD: soft, nontender, nondistended, incision CDI, PANCHO SS  : lee secured with clear urine       LABS:                        12.6   8.89  )-----------( 314      ( 11 Jun 2021 03:22 )             38.7       06-11    141  |  105  |  7   ----------------------------<  122<H>  2.9<LL>   |  23  |  0.40<L>    Ca    8.9      11 Jun 2021 03:22  Phos  1.9     06-11  Mg     1.4     06-11

## 2021-06-11 NOTE — PROGRESS NOTE ADULT - SUBJECTIVE AND OBJECTIVE BOX
SURGICAL INTENSIVE CARE UNIT DAILY PROGRESS NOTE    24 HOUR EVENTS:   - Extubated   - Keppra decreased to 750 mg BID  - Thiamine started   - Metoprolol increased 10mg q6hr  - Agitated, stating he'll take out the NGT.  Verbally re-directable, provided benzocaine lozenge to help w/ throat irritation.  - NGT w/ 2050cc over 24 hours, given 500cc LR bolus as part of 1/2 - 1 repletions    HPI: 41M PMH seizure disorder, h/o prostate cancer, now s/p radical retropubic prostatectomy, pelvic lymph node dissection c/b intraoperative hemorrhage, post-operative course c/b SICU agitation/possible ETOH withdrawal, respiratory failure, now extubated.     NEURO  - Awake, alert, oriented  - Follows commands, moves all extremities    RESPIRATORY  RR: 24 (06-10-21 @ 01:00) (12 - 30)  SpO2: 94% (06-10-21 @ 01:00) (93% - 100%)  ABG - ( 08 Jun 2021 14:20 )  pH: x     /  pCO2: 42    /  pO2: 138   / HCO3: 24    / Base Excess: -0.8  /  SaO2: 98.9    Lactate: x      - No increased work of breathing     CARDIOVASCULAR  HR: 113 (06-10-21 @ 01:00) (66 - 113)  BP: 137/88 (06-10-21 @ 01:00) (104/69 - 154/92)  BP(mean): 103 (06-10-21 @ 01:00) (80 - 126)  ABP: 163/79 (06-10-21 @ 01:00) (109/64 - 175/93)  ABP(mean): 106 (06-10-21 @ 01:00) (80 - 125)  - Hemodynamically stable     GI/NUTRITION  Diet: NPO / NGT    GENITOURINARY  I&O's Detail    06-08 @ 07:01  -  06-09 @ 07:00  --------------------------------------------------------  IN:    Dexmedetomidine: 335.7 mL    FentaNYL: 142.1 mL    IV PiggyBack: 750 mL    Pivot 1.5: 350 mL    Propofol: 164.9 mL  Total IN: 1742.7 mL    OUT:    Bulb (mL): 6 mL    Emesis (mL): 360 mL    Indwelling Catheter - Urethral (mL): 1645 mL    Nasogastric/Oral tube (mL): 1100 mL  Total OUT: 3111 mL    Total NET: -1368.3 mL      06-09 @ 07:01  -  06-10 @ 02:21  --------------------------------------------------------  IN:    Dexmedetomidine: 34.9 mL    FentaNYL: 17.4 mL    IV PiggyBack: 400 mL    Propofol: 17.5 mL  Total IN: 469.8 mL    OUT:    Bulb (mL): 5 mL    Indwelling Catheter - Urethral (mL): 960 mL    Nasogastric/Oral tube (mL): 600 mL  Total OUT: 1565 mL    Total NET: -1095.2 mL      06-10    136  |  101  |  8   ----------------------------<  85  3.6   |  15<L>  |  0.48<L>    Ca    8.5      10 Chato 2021 01:00  Phos  2.7     06-10  Mg     1.5     06-10      HEMATOLOGIC                        12.3   7.89  )-----------( 239      ( 10 Chato 2021 01:00 )             38.8         INFECTIOUS DISEASES  RECENT CULTURES:     SURGICAL INTENSIVE CARE UNIT DAILY PROGRESS NOTE    24 HOUR EVENTS:   - Extubated   - Keppra decreased to 750 mg BID  - Thiamine started   - Metoprolol increased 10mg q6hr  - Agitated, stating he'll take out the NGT.  Verbally re-directable, provided benzocaine lozenge to help w/ throat irritation.  - NGT w/ 2050cc over 24 hours, given 500cc LR bolus as part of 1/2 - 1 repletions  - Low potassium, magnesium, phos --> repletions  - Needs BMP in the afternoon    HPI: 41M PMH seizure disorder, h/o prostate cancer, now s/p radical retropubic prostatectomy, pelvic lymph node dissection c/b intraoperative hemorrhage, post-operative course c/b SICU agitation/possible ETOH withdrawal, respiratory failure, now extubated.     NEURO  - Awake, alert, oriented  - Follows commands, moves all extremities    RESPIRATORY  RR: 24 (06-10-21 @ 01:00) (12 - 30)  SpO2: 94% (06-10-21 @ 01:00) (93% - 100%)  ABG - ( 08 Jun 2021 14:20 )  pH: x     /  pCO2: 42    /  pO2: 138   / HCO3: 24    / Base Excess: -0.8  /  SaO2: 98.9    Lactate: x      - No increased work of breathing     CARDIOVASCULAR  HR: 113 (06-10-21 @ 01:00) (66 - 113)  BP: 137/88 (06-10-21 @ 01:00) (104/69 - 154/92)  BP(mean): 103 (06-10-21 @ 01:00) (80 - 126)  ABP: 163/79 (06-10-21 @ 01:00) (109/64 - 175/93)  ABP(mean): 106 (06-10-21 @ 01:00) (80 - 125)  - Hemodynamically stable     GI/NUTRITION  Diet: NPO / NGT    GENITOURINARY  I&O's Detail    06-08 @ 07:01  -  06-09 @ 07:00  --------------------------------------------------------  IN:    Dexmedetomidine: 335.7 mL    FentaNYL: 142.1 mL    IV PiggyBack: 750 mL    Pivot 1.5: 350 mL    Propofol: 164.9 mL  Total IN: 1742.7 mL    OUT:    Bulb (mL): 6 mL    Emesis (mL): 360 mL    Indwelling Catheter - Urethral (mL): 1645 mL    Nasogastric/Oral tube (mL): 1100 mL  Total OUT: 3111 mL    Total NET: -1368.3 mL      06-09 @ 07:01  -  06-10 @ 02:21  --------------------------------------------------------  IN:    Dexmedetomidine: 34.9 mL    FentaNYL: 17.4 mL    IV PiggyBack: 400 mL    Propofol: 17.5 mL  Total IN: 469.8 mL    OUT:    Bulb (mL): 5 mL    Indwelling Catheter - Urethral (mL): 960 mL    Nasogastric/Oral tube (mL): 600 mL  Total OUT: 1565 mL    Total NET: -1095.2 mL      06-10    136  |  101  |  8   ----------------------------<  85  3.6   |  15<L>  |  0.48<L>    Ca    8.5      10 Chato 2021 01:00  Phos  2.7     06-10  Mg     1.5     06-10      HEMATOLOGIC                        12.3   7.89  )-----------( 239      ( 10 Chato 2021 01:00 )             38.8         INFECTIOUS DISEASES  RECENT CULTURES:     SURGICAL INTENSIVE CARE UNIT DAILY PROGRESS NOTE    24 HOUR EVENTS:   - Extubated   - Keppra decreased to 750 mg BID  - Thiamine started   - Metoprolol increased 10mg q6hr  - Agitated, stating he'll take out the NGT.  Verbally re-directable, provided benzocaine lozenge to help w/ throat irritation.  - NGT w/ 1300cc over 12 hours, given 500cc LR bolus (for day shift output) and 600cc for nightshift output as part of 1/2 - 1 repletions  - Low potassium, magnesium, phos --> repletions  - Needs BMP in the afternoon      HPI: 41M PMH seizure disorder, h/o prostate cancer, now s/p radical retropubic prostatectomy, pelvic lymph node dissection c/b intraoperative hemorrhage, post-operative course c/b SICU agitation/possible ETOH withdrawal, respiratory failure, now extubated.     NEURO  - Awake, alert, oriented  - Follows commands, moves all extremities    RESPIRATORY  RR: 24 (06-10-21 @ 01:00) (12 - 30)  SpO2: 94% (06-10-21 @ 01:00) (93% - 100%)  ABG - ( 08 Jun 2021 14:20 )  pH: x     /  pCO2: 42    /  pO2: 138   / HCO3: 24    / Base Excess: -0.8  /  SaO2: 98.9    Lactate: x      - No increased work of breathing     CARDIOVASCULAR  HR: 113 (06-10-21 @ 01:00) (66 - 113)  BP: 137/88 (06-10-21 @ 01:00) (104/69 - 154/92)  BP(mean): 103 (06-10-21 @ 01:00) (80 - 126)  ABP: 163/79 (06-10-21 @ 01:00) (109/64 - 175/93)  ABP(mean): 106 (06-10-21 @ 01:00) (80 - 125)  - Hemodynamically stable     GI/NUTRITION  Diet: NPO / NGT    GENITOURINARY  I&O's Detail    06-08 @ 07:01  -  06-09 @ 07:00  --------------------------------------------------------  IN:    Dexmedetomidine: 335.7 mL    FentaNYL: 142.1 mL    IV PiggyBack: 750 mL    Pivot 1.5: 350 mL    Propofol: 164.9 mL  Total IN: 1742.7 mL    OUT:    Bulb (mL): 6 mL    Emesis (mL): 360 mL    Indwelling Catheter - Urethral (mL): 1645 mL    Nasogastric/Oral tube (mL): 1100 mL  Total OUT: 3111 mL    Total NET: -1368.3 mL      06-09 @ 07:01  -  06-10 @ 02:21  --------------------------------------------------------  IN:    Dexmedetomidine: 34.9 mL    FentaNYL: 17.4 mL    IV PiggyBack: 400 mL    Propofol: 17.5 mL  Total IN: 469.8 mL    OUT:    Bulb (mL): 5 mL    Indwelling Catheter - Urethral (mL): 960 mL    Nasogastric/Oral tube (mL): 600 mL  Total OUT: 1565 mL    Total NET: -1095.2 mL      06-10    136  |  101  |  8   ----------------------------<  85  3.6   |  15<L>  |  0.48<L>    Ca    8.5      10 Chato 2021 01:00  Phos  2.7     06-10  Mg     1.5     06-10      HEMATOLOGIC                        12.3   7.89  )-----------( 239      ( 10 Chato 2021 01:00 )             38.8         INFECTIOUS DISEASES  RECENT CULTURES:

## 2021-06-11 NOTE — PHYSICAL THERAPY INITIAL EVALUATION ADULT - PERTINENT HX OF CURRENT PROBLEM, REHAB EVAL
Patient is a 41 year old male admitted to [facility] with PMH seizure disorder, h/o prostate cancer, now s/p radical retropubic prostatectomy, pelvic lymph node dissection c/b intraoperative hemorrhage, post-operative course c/b SICU agitation/possible ETOH withdrawal, respiratory failure, now extubated.

## 2021-06-11 NOTE — PROGRESS NOTE ADULT - ATTENDING COMMENTS
Patient with prostatectomy and pelvic lnd with acute blood loss anemia and signs of etoh withdrawal. Patient now hemodynamically stable with postop ileus. NGT with high output being replaced 1/2 cc per cc repletion    -acute blood loss anemia  stablilized   -etoh withdrawal  ativan prn  - postop ileus  1/2cc per cc repletion  npo 7 days, will evaluate to see if patient opens up 72 hours prior to consider tpn    patient without critical care needs  floor eligible    I have personally interviewed when possible and examined the patient, reviewed data and laboratory tests/x-rays and all pertinent electronic images.  I was physically present for the key portions of the evaluation and management (E/M) service provided.   The SICU team has a constant risk benefit analyzes discussion with the primary team, all consultants, House Staff and PA's on all decisions.  These diagnoses are unrelated to the surgical procedure noted above.  I meet with family if needed to get further history, discuss the case and make care decisions for this patient who might not be able to participate.  Time involved in performance of separately billable procedures was not counted toward my critical care time. There is no overlap.  I spent 30 minutes ( 0800Hrs-0915Hrs in AM/ 1600Hrs-1715Hrs in PM, or other time indicated) of critical care time for the diagnoses, assessment, plan and interventions.  This time excludes time spent on separate procedures and teaching.

## 2021-06-11 NOTE — PHYSICAL THERAPY INITIAL EVALUATION ADULT - PATIENT PROFILE REVIEW, REHAB EVAL
PT orders received: no formal activity order. Consult with KIRAN AMADO, pt may participate in PT evaluation./yes

## 2021-06-11 NOTE — PROGRESS NOTE ADULT - ASSESSMENT
ASSESSMENT:  41M s/p radical prostatectomy w pelvic LN dissection, c/b intraop hemorrhage, course c/b agitation v. ETOH withdraw respiratory failure, now extubated.     Plan:    Neurologic: h/o seizures  - Keppra 750 mg BID  - Neurology following  - Pain control PRN     Respiratory  - Monitor SpO2, comfortable on RA  - F/U CXR     Cardiovascular: h/o HTN  - C/w metoprolol 10mg q6hr  - Monitor hemodynamics     Gastrointestinal/Nutrition:  - NPO for now / NGT to LCWS  - Thiamine 100 mg IVP qd  - Protonix 40 mg IVP qd    Renal/Genitourinary: s/p radical prostatectomy  - Follow I/Os  - Leave lee in per urology  - Trend CR, electrolytes  - IVF: D5 1/2 NS @ 50cc/hr    Hematologic: no acute issues  - Trend CBC  - DVT ppx: Lovenox    Infectious Disease  - Trend WBC count, fever curve    Tubes/Lines/Drains:  - PIVs  - Lee catheter  - L PANCHO    Endocrine: No acute issues  - Monitor serum glucose on BMP    Disposition: SICU   ASSESSMENT:  41M s/p radical prostatectomy w pelvic LN dissection, c/b intraop hemorrhage, course c/b agitation v. ETOH withdraw respiratory failure, now extubated.     Plan:    Neurologic: h/o seizures  - Keppra 750 mg BID  - Neurology following  - Pain control PRN     Respiratory  - Monitor SpO2, comfortable on RA  - F/U CXR     Cardiovascular: h/o HTN  - C/w metoprolol 10mg q6hr  - Monitor hemodynamics     Gastrointestinal/Nutrition:  - NPO for now / NGT to LCWS  - Thiamine 100 mg IVP qd  - Protonix 40 mg IVP qd    Renal/Genitourinary: s/p radical prostatectomy  - Follow I/Os  - Leave lee in per urology  - Trend CR, electrolytes  - Low potassium, magnesium, phosphorus s/p replection  - Re-check BMP in the afternoon  - IVF: D5 1/2 NS @ 50cc/hr    Hematologic: no acute issues  - Trend CBC  - DVT ppx: Lovenox    Infectious Disease  - Trend WBC count, fever curve    Tubes/Lines/Drains:  - PIVs  - Lee catheter  - L PANCHO    Endocrine: No acute issues  - Monitor serum glucose on BMP    Disposition: SICU

## 2021-06-11 NOTE — PHYSICAL THERAPY INITIAL EVALUATION ADULT - ADDITIONAL COMMENTS
Patient reports he lives with his mother in an apartment, +elevator. Patient reports he was previously independent in all ADLs and did not use an assistive device for ambulation.    Patient was left sitting in recliner, all lines/tubes intact and call bell within reach, RN aware

## 2021-06-11 NOTE — CHART NOTE - NSCHARTNOTEFT_GEN_A_CORE
Neurology chart note    Received call from primary team regarding titration of Keppra. Patient's EEG reports 06/07-06/09 showed multiple push button events with no electrographic correlate. Patient has had no further seizure-like events.    Recommendations:  [] Can decrease Keppra to 500mg BID  [] Seizure/aspiration precautions    Please see initial consult and progress notes for full details and recommendations.

## 2021-06-11 NOTE — PROGRESS NOTE ADULT - ASSESSMENT
41y Male w/ recently diagnosed prostate cancer s/p radical prostatectomy.  Operative course c/b 5 units blood loss, s/p 12 units pRBC, 7 units FFP, 3 units platelets, 14L crystalloids, and 1L albumin, admitted to the SICU intubated and for hemorrhage watch.    6/4: Sicu for hemodynamic monitoring H/H stable   6/5: Seizures, Neuro consulted, CTA CTH  6/6: seizures today, EEG in progress  6/7: Continues to have seizure activity, EEG in progress  6/8: No eleptiform activity on EEG, Febrile to 100.7 F overnight, remains intubated and sedated, TF at goal  6/8: Unsuccessful attempt to wean ventilator TF held given emesis, NGT placed.   6/9: extubated   6/10: NGT with high output. Tmax 100.4 F   6/11: Afebrile, NGT with high output, 1/2 to 1 repletions     - Extubated, sating well on RA  - If spikes fever, send cultures   - NPO, monitor NGT output  - replete electrolytes prn   - Trend Cr and I/Os  - Trend H/H, stable   - Continue lee catheter   - f/up neuro, rec: brain MRI

## 2021-06-11 NOTE — PROGRESS NOTE ADULT - NUTRITIONAL ASSESSMENT
This patient has been assessed with a concern for Malnutrition and has been determined to have a diagnosis/diagnoses of Severe protein-calorie malnutrition and Underweight (BMI < 19).    This patient is being managed with:   Diet NPO with Tube Feed-  Tube Feeding Modality: Nasogastric  Pivot 1.5 Khari (PIVOT1.5RTH)  Total Volume for 24 Hours (mL): 1200  Continuous  Starting Tube Feed Rate {mL per Hour}: 20  Increase Tube Feed Rate by (mL): 20  Until Goal Tube Feed Rate (mL per Hour): 50  Tube Feed Duration (in Hours): 24  Tube Feed Start Time: 17:00  No Carb Prosource (1pkg = 15gms Protein)     Qty per Day:  1  Entered: Jun 7 2021  4:14PM    
This patient has been assessed with a concern for Malnutrition and has been determined to have a diagnosis/diagnoses of Severe protein-calorie malnutrition and Underweight (BMI < 19).    This patient is being managed with:   Diet NPO-  Entered: Jun 8 2021  2:46PM    
This patient has been assessed with a concern for Malnutrition and has been determined to have a diagnosis/diagnoses of Severe protein-calorie malnutrition and Underweight (BMI < 19).    This patient is being managed with:   Diet NPO with Tube Feed-  Tube Feeding Modality: Nasogastric  Pivot 1.5 Khari (PIVOT1.5RTH)  Total Volume for 24 Hours (mL): 1200  Continuous  Starting Tube Feed Rate {mL per Hour}: 20  Increase Tube Feed Rate by (mL): 20  Until Goal Tube Feed Rate (mL per Hour): 50  Tube Feed Duration (in Hours): 24  Tube Feed Start Time: 17:00  No Carb Prosource (1pkg = 15gms Protein)     Qty per Day:  1  Entered: Jun 7 2021  4:14PM

## 2021-06-11 NOTE — PROGRESS NOTE ADULT - REASON FOR ADMISSION
Hemorrhagic shock
Hemorrhagic shock
radical prostatectomy
Prostatectomy
prostate cancer
radical prostatectomy
prostate cancer
prostate cancer

## 2021-06-12 ENCOUNTER — NON-APPOINTMENT (OUTPATIENT)
Age: 41
End: 2021-06-12

## 2021-06-12 VITALS
RESPIRATION RATE: 17 BRPM | OXYGEN SATURATION: 98 % | TEMPERATURE: 99 F | DIASTOLIC BLOOD PRESSURE: 80 MMHG | SYSTOLIC BLOOD PRESSURE: 148 MMHG | HEART RATE: 99 BPM

## 2021-06-12 DIAGNOSIS — I10 ESSENTIAL (PRIMARY) HYPERTENSION: ICD-10-CM

## 2021-06-12 DIAGNOSIS — R50.9 FEVER, UNSPECIFIED: ICD-10-CM

## 2021-06-12 LAB
ANION GAP SERPL CALC-SCNC: 14 MMOL/L — SIGNIFICANT CHANGE UP (ref 7–14)
BUN SERPL-MCNC: 8 MG/DL — SIGNIFICANT CHANGE UP (ref 7–23)
CALCIUM SERPL-MCNC: 9.3 MG/DL — SIGNIFICANT CHANGE UP (ref 8.4–10.5)
CHLORIDE SERPL-SCNC: 103 MMOL/L — SIGNIFICANT CHANGE UP (ref 98–107)
CO2 SERPL-SCNC: 23 MMOL/L — SIGNIFICANT CHANGE UP (ref 22–31)
CREAT SERPL-MCNC: 0.42 MG/DL — LOW (ref 0.5–1.3)
GLUCOSE SERPL-MCNC: 104 MG/DL — HIGH (ref 70–99)
HCT VFR BLD CALC: 37.5 % — LOW (ref 39–50)
HGB BLD-MCNC: 12.3 G/DL — LOW (ref 13–17)
MAGNESIUM SERPL-MCNC: 1.4 MG/DL — LOW (ref 1.6–2.6)
MCHC RBC-ENTMCNC: 28.5 PG — SIGNIFICANT CHANGE UP (ref 27–34)
MCHC RBC-ENTMCNC: 32.8 GM/DL — SIGNIFICANT CHANGE UP (ref 32–36)
MCV RBC AUTO: 87 FL — SIGNIFICANT CHANGE UP (ref 80–100)
NRBC # BLD: 0 /100 WBCS — SIGNIFICANT CHANGE UP
NRBC # FLD: 0 K/UL — SIGNIFICANT CHANGE UP
PHOSPHATE SERPL-MCNC: 1.1 MG/DL — LOW (ref 2.5–4.5)
PLATELET # BLD AUTO: 416 K/UL — HIGH (ref 150–400)
POTASSIUM SERPL-MCNC: 3.2 MMOL/L — LOW (ref 3.5–5.3)
POTASSIUM SERPL-SCNC: 3.2 MMOL/L — LOW (ref 3.5–5.3)
RBC # BLD: 4.31 M/UL — SIGNIFICANT CHANGE UP (ref 4.2–5.8)
RBC # FLD: 14.6 % — HIGH (ref 10.3–14.5)
SODIUM SERPL-SCNC: 140 MMOL/L — SIGNIFICANT CHANGE UP (ref 135–145)
WBC # BLD: 9.92 K/UL — SIGNIFICANT CHANGE UP (ref 3.8–10.5)
WBC # FLD AUTO: 9.92 K/UL — SIGNIFICANT CHANGE UP (ref 3.8–10.5)

## 2021-06-12 PROCEDURE — 70551 MRI BRAIN STEM W/O DYE: CPT | Mod: 26

## 2021-06-12 PROCEDURE — 71045 X-RAY EXAM CHEST 1 VIEW: CPT | Mod: 26

## 2021-06-12 PROCEDURE — 74177 CT ABD & PELVIS W/CONTRAST: CPT | Mod: 26

## 2021-06-12 RX ORDER — LEVETIRACETAM 250 MG/1
500 TABLET, FILM COATED ORAL ONCE
Refills: 0 | Status: COMPLETED | OUTPATIENT
Start: 2021-06-12 | End: 2021-06-12

## 2021-06-12 RX ORDER — MAGNESIUM SULFATE 500 MG/ML
2 VIAL (ML) INJECTION ONCE
Refills: 0 | Status: COMPLETED | OUTPATIENT
Start: 2021-06-12 | End: 2021-06-12

## 2021-06-12 RX ORDER — ACETAMINOPHEN 500 MG
1000 TABLET ORAL EVERY 8 HOURS
Refills: 0 | Status: DISCONTINUED | OUTPATIENT
Start: 2021-06-12 | End: 2021-06-12

## 2021-06-12 RX ORDER — NICOTINE POLACRILEX 2 MG
1 GUM BUCCAL DAILY
Refills: 0 | Status: DISCONTINUED | OUTPATIENT
Start: 2021-06-12 | End: 2021-06-12

## 2021-06-12 RX ORDER — POTASSIUM CHLORIDE 20 MEQ
40 PACKET (EA) ORAL EVERY 4 HOURS
Refills: 0 | Status: DISCONTINUED | OUTPATIENT
Start: 2021-06-12 | End: 2021-06-12

## 2021-06-12 RX ORDER — LEVETIRACETAM 250 MG/1
500 TABLET, FILM COATED ORAL EVERY 12 HOURS
Refills: 0 | Status: DISCONTINUED | OUTPATIENT
Start: 2021-06-12 | End: 2021-06-12

## 2021-06-12 RX ORDER — LIDOCAINE HCL 20 MG/ML
20 VIAL (ML) INJECTION ONCE
Refills: 0 | Status: COMPLETED | OUTPATIENT
Start: 2021-06-12 | End: 2021-06-12

## 2021-06-12 RX ORDER — LEVETIRACETAM 250 MG/1
250 TABLET, FILM COATED ORAL ONCE
Refills: 0 | Status: CANCELLED | OUTPATIENT
Start: 2021-06-13 | End: 2021-06-12

## 2021-06-12 RX ORDER — TETRACAINE/BENZOCAINE/BUTAMBEN 2%-14%-2%
1 OINTMENT (GRAM) TOPICAL ONCE
Refills: 0 | Status: COMPLETED | OUTPATIENT
Start: 2021-06-12 | End: 2021-06-12

## 2021-06-12 RX ORDER — OXYMETAZOLINE HYDROCHLORIDE 0.5 MG/ML
2 SPRAY NASAL ONCE
Refills: 0 | Status: COMPLETED | OUTPATIENT
Start: 2021-06-12 | End: 2021-06-12

## 2021-06-12 RX ORDER — POTASSIUM CHLORIDE 20 MEQ
10 PACKET (EA) ORAL
Refills: 0 | Status: COMPLETED | OUTPATIENT
Start: 2021-06-12 | End: 2021-06-12

## 2021-06-12 RX ORDER — PIPERACILLIN AND TAZOBACTAM 4; .5 G/20ML; G/20ML
3.38 INJECTION, POWDER, LYOPHILIZED, FOR SOLUTION INTRAVENOUS EVERY 8 HOURS
Refills: 0 | Status: DISCONTINUED | OUTPATIENT
Start: 2021-06-12 | End: 2021-06-12

## 2021-06-12 RX ORDER — PIPERACILLIN AND TAZOBACTAM 4; .5 G/20ML; G/20ML
3.38 INJECTION, POWDER, LYOPHILIZED, FOR SOLUTION INTRAVENOUS ONCE
Refills: 0 | Status: COMPLETED | OUTPATIENT
Start: 2021-06-12 | End: 2021-06-12

## 2021-06-12 RX ORDER — LEVETIRACETAM 250 MG/1
500 TABLET, FILM COATED ORAL ONCE
Refills: 0 | Status: CANCELLED | OUTPATIENT
Start: 2021-06-13 | End: 2021-06-12

## 2021-06-12 RX ADMIN — Medication 120 MILLIGRAM(S): at 17:04

## 2021-06-12 RX ADMIN — DEXTROSE MONOHYDRATE, SODIUM CHLORIDE, AND POTASSIUM CHLORIDE 100 MILLILITER(S): 50; .745; 4.5 INJECTION, SOLUTION INTRAVENOUS at 10:10

## 2021-06-12 RX ADMIN — Medication 1000 MILLIGRAM(S): at 14:03

## 2021-06-12 RX ADMIN — LEVETIRACETAM 400 MILLIGRAM(S): 250 TABLET, FILM COATED ORAL at 18:44

## 2021-06-12 RX ADMIN — Medication 100 MILLIGRAM(S): at 13:04

## 2021-06-12 RX ADMIN — DEXTROSE MONOHYDRATE, SODIUM CHLORIDE, AND POTASSIUM CHLORIDE 100 MILLILITER(S): 50; .745; 4.5 INJECTION, SOLUTION INTRAVENOUS at 06:49

## 2021-06-12 RX ADMIN — Medication 120 MILLIGRAM(S): at 02:12

## 2021-06-12 RX ADMIN — HYDROMORPHONE HYDROCHLORIDE 0.5 MILLIGRAM(S): 2 INJECTION INTRAMUSCULAR; INTRAVENOUS; SUBCUTANEOUS at 04:30

## 2021-06-12 RX ADMIN — PIPERACILLIN AND TAZOBACTAM 200 GRAM(S): 4; .5 INJECTION, POWDER, LYOPHILIZED, FOR SOLUTION INTRAVENOUS at 17:04

## 2021-06-12 RX ADMIN — LEVETIRACETAM 400 MILLIGRAM(S): 250 TABLET, FILM COATED ORAL at 06:49

## 2021-06-12 RX ADMIN — Medication 120 MILLIGRAM(S): at 10:44

## 2021-06-12 RX ADMIN — Medication 100 MILLIEQUIVALENT(S): at 11:42

## 2021-06-12 RX ADMIN — ENOXAPARIN SODIUM 40 MILLIGRAM(S): 100 INJECTION SUBCUTANEOUS at 19:12

## 2021-06-12 RX ADMIN — Medication 100 MILLIEQUIVALENT(S): at 13:05

## 2021-06-12 RX ADMIN — PIPERACILLIN AND TAZOBACTAM 25 GRAM(S): 4; .5 INJECTION, POWDER, LYOPHILIZED, FOR SOLUTION INTRAVENOUS at 18:44

## 2021-06-12 RX ADMIN — Medication 100 MILLIEQUIVALENT(S): at 15:47

## 2021-06-12 RX ADMIN — HYDROMORPHONE HYDROCHLORIDE 0.5 MILLIGRAM(S): 2 INJECTION INTRAMUSCULAR; INTRAVENOUS; SUBCUTANEOUS at 03:53

## 2021-06-12 RX ADMIN — Medication 400 MILLIGRAM(S): at 13:05

## 2021-06-12 RX ADMIN — Medication 50 GRAM(S): at 10:06

## 2021-06-12 RX ADMIN — OXYMETAZOLINE HYDROCHLORIDE 2 SPRAY(S): 0.5 SPRAY NASAL at 09:41

## 2021-06-12 NOTE — CONSULT NOTE ADULT - ASSESSMENT
40 yo M with recently Dx prostate CA s/p prostatectomy complicated by bleeding, ? seizure-like activity but EEG neg. Febrile today after pulling NGT and lee

## 2021-06-12 NOTE — PROVIDER CONTACT NOTE (OTHER) - SITUATION
Pt alert and oriented but ame off affect. [place] bed alarm going off, ran into room to find pt standing with lee ripped out, iv out and NG tube taken out all by pt. Pt instructed to get back in bed

## 2021-06-12 NOTE — PROGRESS NOTE ADULT - SUBJECTIVE AND OBJECTIVE BOX
Overnight events:  None, transferred from SICU last evening in stable condition    Subjective:  Pt denies N/V, + flatus/BM, c/o irritation from NGT, was OOB to chair in SICU yesterday    Objective:    Vital signs  T(C): , Max: 37.4 (06-12-21 @ 01:34)  HR: 100 (06-12-21 @ 05:30)  BP: 151/86 (06-12-21 @ 05:30)  SpO2: 95% (06-12-21 @ 05:30)  Wt(kg): --    Output   Jesus: 600  PANCHO: 7.5  NGT: 1000 bilious  06-11 @ 07:01  -  06-12 @ 07:00  --------------------------------------------------------  IN: 2100 mL / OUT: 3164.5 mL / NET: -1064.5 mL        Gen: NAD  Abd: retention sutures intact, underlying skin c/d/i, staples intact, soft, nontender, nondistended  : jesus secured    Labs: BMP pending                        12.3   9.92  )-----------( 416      ( 12 Jun 2021 07:21 )             37.5     11 Jun 2021 16:06    144    |  107    |  8      ----------------------------<  126    3.2     |  25     |  0.45     Ca    8.6        11 Jun 2021 16:06  Phos  2.6       11 Jun 2021 16:06  Mg     1.8       11 Jun 2021 16:06

## 2021-06-12 NOTE — CONSULT NOTE ADULT - PROBLEM SELECTOR RECOMMENDATION 9
- May be secondary to traumatic removal of lee by pt. No other localizing symptoms  - Repeat BCx, obtain CXR, f/u CT a/p   - Okay to continue zosyn for now

## 2021-06-12 NOTE — CHART NOTE - NSCHARTNOTEFT_GEN_A_CORE
Spoke to neuro  Change Keppra tomorrow to 250mg AM and 500mg PM, they will give further recs tomorrow

## 2021-06-12 NOTE — PROGRESS NOTE ADULT - NSICDXPILOT_GEN_ALL_CORE
[place]

## 2021-06-12 NOTE — CONSULT NOTE ADULT - SUBJECTIVE AND OBJECTIVE BOX
Patient is a 41y old  Male who presents with a chief complaint of radical prostatectomy (11 Jun 2021 01:46)      HPI:  42 yo male with recently diagnosed prostate cancer admitted for elective prostatectomy w/ operative course c/b 5 units blood loss, s/p 12 units pRBC, 7 units FFP, 3 units platelets, 14L crystalloids, and 1L albumin, admitted to the SICU intubated and for hemorrhage watch. Post operatively, pt noted to have whole body shaking concerning for seizure and he was started on Keppra. EEG showed slowing but no epileptiform activity and CTA head was negative. Per neuro, convulsions may have been precipitated by weaning sedation or possibly metabolic etiology and pt now being weaned off Keppra. This morning pt pulled out his NGT and lee catheter, later replaced by urology team. He was also febrile this afternoon 101.4. Pt is currently awake but slow to respond. States NGT is uncomfortable but otherwise no other complaints. He denies any coug or shortness of breath, no abdominal pain.    Allergies    No Known Allergies    Intolerances        HOME MEDICATIONS: [x ] Reviewed    MEDICATIONS  (STANDING):  acetaminophen  IVPB .. 1000 milliGRAM(s) IV Intermittent every 8 hours  benzocaine 20% Spray 1 Spray(s) Topical once  dextrose 5% + sodium chloride 0.45% with potassium chloride 20 mEq/L 1000 milliLiter(s) (100 mL/Hr) IV Continuous <Continuous>  enoxaparin Injectable 40 milliGRAM(s) SubCutaneous daily  levETIRAcetam  IVPB 500 milliGRAM(s) IV Intermittent every 12 hours  metoprolol tartrate IVPB 10 milliGRAM(s) IV Intermittent every 6 hours  nicotine - 21 mG/24Hr(s) Patch 1 patch Transdermal daily  piperacillin/tazobactam IVPB. 3.375 Gram(s) IV Intermittent once  piperacillin/tazobactam IVPB.. 3.375 Gram(s) IV Intermittent every 8 hours  potassium chloride  10 mEq/100 mL IVPB 10 milliEquivalent(s) IV Intermittent every 1 hour    MEDICATIONS  (PRN):  ALBUTerol    90 MICROgram(s) HFA Inhaler 2 Puff(s) Inhalation every 6 hours PRN Shortness of Breath and/or Wheezing  HYDROmorphone  Injectable 0.5 milliGRAM(s) IV Push every 4 hours PRN Moderate and Severe Pain  ondansetron Injectable 8 milliGRAM(s) IV Push every 8 hours PRN Nausea and/or Vomiting      PAST MEDICAL & SURGICAL HISTORY:  Seizure    Asthma    Malignant neoplasm of prostate    Hypertension    History of colon surgery    S/P hernia surgery    H/O laceration of skin    [ ] Reviewed     SOCIAL HISTORY:  Residence: [ ] [facility]  [ ] SNF  [x ] Community  [ ] Substance abuse: none  [ ] Tobacco: current smoker 1pack every 3 days x 30 yrs   [ ] Alcohol use: occasional     FAMILY HISTORY:  FH: epilepsy (Mother)        REVIEW OF SYSTEMS:    CONSTITUTIONAL: No fever, weight loss, or fatigue  EYES: No eye pain, visual disturbances, or discharge  ENMT:  No difficulty hearing, tinnitus, vertigo; No sinus or throat pain  NECK: No pain or stiffness  BREASTS: No pain, masses, or nipple discharge  RESPIRATORY: No cough, wheezing, chills or hemoptysis; No shortness of breath  CARDIOVASCULAR: No chest pain, palpitations, dizziness, or leg swelling  GASTROINTESTINAL: No abdominal or epigastric pain. No nausea, vomiting, or hematemesis; No diarrhea or constipation. No melena or hematochezia.  GENITOURINARY: No dysuria, frequency, hematuria, or incontinence  NEUROLOGICAL: No headaches, memory loss, loss of strength, numbness, or tremors  SKIN: No itching, burning, rashes, or lesions   LYMPH NODES: No enlarged glands  ENDOCRINE: No heat or cold intolerance; No hair loss  MUSCULOSKELETAL: No muscle or back pain  PSYCHIATRIC: No depression, anxiety, mood swings, or difficulty sleeping  HEME/LYMPH: No easy bruising, or bleeding gums  ALLERGY AND IMMUNOLOGIC: No hives or eczema        Vital Signs Last 24 Hrs  T(C): 37.2 (12 Jun 2021 14:03), Max: 38.6 (12 Jun 2021 12:57)  T(F): 99 (12 Jun 2021 14:03), Max: 101.4 (12 Jun 2021 12:57)  HR: 104 (12 Jun 2021 14:03) (63 - 109)  BP: 149/78 (12 Jun 2021 12:57) (144/80 - 164/84)  BP(mean): 118 (11 Jun 2021 16:00) (108 - 118)  RR: 17 (12 Jun 2021 12:57) (16 - 22)  SpO2: 96% (12 Jun 2021 12:57) (95% - 98%)    PHYSICAL EXAM:    GENERAL: NAD, well-groomed, well-developed  HEAD:  Atraumatic, Normocephalic  EYES: EOMI, PERRLA, conjunctiva and sclera clear  ENMT: Moist mucous membranes, NGT in place  NECK: Supple, No JVD  RESPIRATORY: Clear to auscultation bilaterally; No rales, rhonchi, wheezing, or rubs  CARDIOVASCULAR: Regular rate and rhythm; No murmurs, rubs, or gallops  GASTROINTESTINAL: Soft, Nontender, Nondistended; Bowel sounds present  GENITOURINARY: Not examined  EXTREMITIES:  2+ Peripheral Pulses, No clubbing, cyanosis, or edema  NERVOUS SYSTEM:  Alert & Oriented X3; Moving all 4 extremities; No gross sensory deficits  HEME/LYMPH: No lymphadenopathy noted  SKIN: No rashes or lesions; Incisions C/D/I    LABS:                        12.3   9.92  )-----------( 416      ( 12 Jun 2021 07:21 )             37.5     Hemoglobin: 12.3 g/dL (06-12 @ 07:21)  Hemoglobin: 12.6 g/dL (06-11 @ 03:22)  Hemoglobin: 12.3 g/dL (06-10 @ 01:00)  Hemoglobin: 11.9 g/dL (06-09 @ 00:46)  Hemoglobin: 12.3 g/dL (06-08 @ 01:10)    06-12    140  |  103  |  8   ----------------------------<  104<H>  3.2<L>   |  23  |  0.42<L>    Ca    9.3      12 Jun 2021 07:21  Phos  1.1     06-12  Mg     1.4     06-12          CAPILLARY BLOOD GLUCOSE        Microbiology   RADIOLOGY & ADDITIONAL STUDIES:      Imaging:   Personally Reviewed:  [x ] YES

## 2021-06-12 NOTE — PROGRESS NOTE ADULT - ATTENDING SUPERVISION STATEMENT
Resident
ACP/Resident
Resident
Resident
Resident/Student
ACP/Resident
Resident
ACP/Resident
Resident
Resident

## 2021-06-12 NOTE — PROGRESS NOTE ADULT - ASSESSMENT
41y Male w/ recently diagnosed prostate cancer s/p radical prostatectomy.  Operative course c/b 5 units blood loss, s/p 12 units pRBC, 7 units FFP, 3 units platelets, 14L crystalloids, and 1L albumin, admitted to the SICU intubated and for hemorrhage watch.    6/4: Sicu for hemodynamic monitoring H/H stable   6/5: Seizures, Neuro consulted, CTA CTH  6/6: seizures today, EEG in progress  6/7: Continues to have seizure activity, EEG in progress  6/8: No eleptiform activity on EEG, Febrile to 100.7 F overnight, remains intubated and sedated, TF at goal  6/8: Unsuccessful attempt to wean ventilator TF held given emesis, NGT placed.   6/9: extubated   6/10: NGT with high output. Tmax 100.4 F   6/11: Afebrile, NGT with high output, 1/2 to 1 repletions, transferred to the floor in stable condition, potassium and magnesium repleted, AXR postop ileus, neuro called rec keppra to 500mg bid, still want MRI brain noncon  6/12: pt states + flatus/small BM, no N/V, still with high NGT output, abdomen benign, PT recs rehab    Plan:  - AM CBC reviewed, f/u BMP  - IV tylenol  - continue NPO, monitor NGT output, monitor GI function  - will d/w Dr. Alvarado CT abdomen pelvis  - f/u brain MRI  - if spikes fever, send cultures   - replete electrolytes prn   - continue lee catheter   - f/u neuro  - f/u medicine  - DVT prophy, IS, OOB, ambulate

## 2021-06-12 NOTE — CHART NOTE - NSCHARTNOTEFT_GEN_A_CORE
Pt has been agitated since this morning and pulled all tubes including lee, NGT and IVs this morning.  After in-depth discusison regarding the necessity of keeping these tubes, especially the lee catheter as patient had a fresh anastamosis, he was willing to have all replaced.  Additionally, this was prompted by inability to void and passage of clots.  Lee gently reinserted blindly without resistance, irrigated.  NGT replaced, CXR showed in place.  Patient also had a fever this afternoon 101.4F and cultures sent.  This evening again, patient repeatedly stated "he needs to go and cannot stay here any longer."  I talked to patient's mother who states he live with her and that he needs to remain in the hospital.  I voiced agreement and she attempted to communicate this with him; however, he repeated profanities at her and ultimately hung up.  After this, he pulled out all remaining IVs.    I again explained that he has many acute issues going on including traumatic removal of lee, new fevers, pending CT scan results, recent ileus.    The patient is A/Ox4 and ultimately makes his own medical decisions.  All risks, including risk of death, were explained and the patient verbalized understanding and signed AMA paperwork.  I removed his NGT, PANCHO drain and he verbalized the need to keep his lee catheter and follow-up with Dr. Alvarado on Monday.  He was provided a leg bag for catheter and given lee teaching.  Dressing supplies also given to the patient. Pt has been agitated since this morning and pulled all tubes including lee, NGT and IVs this morning.  After in-depth discusison regarding the necessity of keeping these tubes, especially the lee catheter as patient had a fresh anastamosis, he was willing to have all replaced.  Additionally, this was prompted by inability to void and passage of clots.  Lee gently reinserted blindly without resistance, irrigated.  NGT replaced, CXR showed in place.  Patient also had a fever this afternoon 101.4F and cultures sent.  This evening again, patient repeatedly stated "he needs to go and cannot stay here any longer."  I talked to patient's mother who states he live with her and that he needs to remain in the hospital.  I voiced agreement and she attempted to communicate this with him; however, he repeated profanities at her and ultimately hung up.  After this, he pulled out all remaining IVs.    I again explained that he has many acute issues going on including traumatic removal of lee, new fevers, pending CT scan results, recent ileus.    The patient is A/Ox4 and ultimately makes his own medical decisions.  All risks, including risk of death, were explained and the patient verbalized understanding and signed AMA paperwork.  I removed his NGT, PANCHO drain and he verbalized the need to keep his lee catheter and follow-up with Dr. Alvarado on Monday.  He was provided a leg bag for catheter and given lee teaching.  Dressing supplies also given to the patient and prescription for empiric antibiotics sent given recent fever and no culture results.

## 2021-06-12 NOTE — PROGRESS NOTE ADULT - PROVIDER SPECIALTY LIST ADULT
SICU
Urology
Urology
Neurology
SICU
SICU
Urology
Urology
Neurology
SICU
Urology
Urology
SICU
SICU
Urology

## 2021-06-13 ENCOUNTER — TRANSCRIPTION ENCOUNTER (OUTPATIENT)
Age: 41
End: 2021-06-13

## 2021-06-13 LAB
GRAM STN FLD: SIGNIFICANT CHANGE UP
METHOD TYPE: SIGNIFICANT CHANGE UP
S MARCESCENS DNA BLD POS NAA+NON-PROBE: SIGNIFICANT CHANGE UP
SPECIMEN SOURCE: SIGNIFICANT CHANGE UP
SPECIMEN SOURCE: SIGNIFICANT CHANGE UP

## 2021-06-14 ENCOUNTER — INPATIENT (INPATIENT)
Facility: HOSPITAL | Age: 41
LOS: 2 days | Discharge: HOME | End: 2021-06-17
Attending: UROLOGY | Admitting: UROLOGY
Payer: MEDICAID

## 2021-06-14 VITALS
OXYGEN SATURATION: 99 % | SYSTOLIC BLOOD PRESSURE: 138 MMHG | RESPIRATION RATE: 18 BRPM | TEMPERATURE: 100 F | HEART RATE: 120 BPM | HEIGHT: 70 IN | DIASTOLIC BLOOD PRESSURE: 79 MMHG

## 2021-06-14 DIAGNOSIS — C61 MALIGNANT NEOPLASM OF PROSTATE: ICD-10-CM

## 2021-06-14 DIAGNOSIS — R56.9 UNSPECIFIED CONVULSIONS: ICD-10-CM

## 2021-06-14 DIAGNOSIS — A41.9 SEPSIS, UNSPECIFIED ORGANISM: ICD-10-CM

## 2021-06-14 DIAGNOSIS — I10 ESSENTIAL (PRIMARY) HYPERTENSION: ICD-10-CM

## 2021-06-14 DIAGNOSIS — Z98.890 OTHER SPECIFIED POSTPROCEDURAL STATES: Chronic | ICD-10-CM

## 2021-06-14 DIAGNOSIS — Z87.828 PERSONAL HISTORY OF OTHER (HEALED) PHYSICAL INJURY AND TRAUMA: Chronic | ICD-10-CM

## 2021-06-14 DIAGNOSIS — F10.10 ALCOHOL ABUSE, UNCOMPLICATED: ICD-10-CM

## 2021-06-14 DIAGNOSIS — R78.81 BACTEREMIA: ICD-10-CM

## 2021-06-14 LAB
ALBUMIN SERPL ELPH-MCNC: 3.6 G/DL — SIGNIFICANT CHANGE UP (ref 3.3–5)
ALP SERPL-CCNC: 62 U/L — SIGNIFICANT CHANGE UP (ref 40–120)
ALT FLD-CCNC: 7 U/L — SIGNIFICANT CHANGE UP (ref 4–41)
ANION GAP SERPL CALC-SCNC: 19 MMOL/L — HIGH (ref 7–14)
APPEARANCE UR: ABNORMAL
AST SERPL-CCNC: 23 U/L — SIGNIFICANT CHANGE UP (ref 4–40)
BACTERIA # UR AUTO: NEGATIVE — SIGNIFICANT CHANGE UP
BASE EXCESS BLDV CALC-SCNC: 1.9 MMOL/L — SIGNIFICANT CHANGE UP (ref -3–2)
BASOPHILS # BLD AUTO: 0.03 K/UL — SIGNIFICANT CHANGE UP (ref 0–0.2)
BASOPHILS NFR BLD AUTO: 0.3 % — SIGNIFICANT CHANGE UP (ref 0–2)
BILIRUB SERPL-MCNC: 0.6 MG/DL — SIGNIFICANT CHANGE UP (ref 0.2–1.2)
BILIRUB UR-MCNC: NEGATIVE — SIGNIFICANT CHANGE UP
BLD GP AB SCN SERPL QL: NEGATIVE — SIGNIFICANT CHANGE UP
BLOOD GAS VENOUS - CREATININE: 0.7 MG/DL — SIGNIFICANT CHANGE UP (ref 0.5–1.3)
BLOOD GAS VENOUS COMPREHENSIVE RESULT: SIGNIFICANT CHANGE UP
BUN SERPL-MCNC: 13 MG/DL — SIGNIFICANT CHANGE UP (ref 7–23)
CALCIUM SERPL-MCNC: 9.4 MG/DL — SIGNIFICANT CHANGE UP (ref 8.4–10.5)
CHLORIDE BLDV-SCNC: 101 MMOL/L — SIGNIFICANT CHANGE UP (ref 96–108)
CHLORIDE SERPL-SCNC: 97 MMOL/L — LOW (ref 98–107)
CO2 SERPL-SCNC: 20 MMOL/L — LOW (ref 22–31)
COLOR SPEC: YELLOW — SIGNIFICANT CHANGE UP
CREAT SERPL-MCNC: 0.64 MG/DL — SIGNIFICANT CHANGE UP (ref 0.5–1.3)
DIFF PNL FLD: ABNORMAL
EOSINOPHIL # BLD AUTO: 0.06 K/UL — SIGNIFICANT CHANGE UP (ref 0–0.5)
EOSINOPHIL NFR BLD AUTO: 0.6 % — SIGNIFICANT CHANGE UP (ref 0–6)
EPI CELLS # UR: 0 /HPF — SIGNIFICANT CHANGE UP (ref 0–5)
GAS PNL BLDV: 135 MMOL/L — LOW (ref 136–146)
GLUCOSE BLDV-MCNC: 82 MG/DL — SIGNIFICANT CHANGE UP (ref 70–99)
GLUCOSE SERPL-MCNC: 83 MG/DL — SIGNIFICANT CHANGE UP (ref 70–99)
GLUCOSE UR QL: NEGATIVE — SIGNIFICANT CHANGE UP
HCO3 BLDV-SCNC: 25 MMOL/L — SIGNIFICANT CHANGE UP (ref 20–27)
HCT VFR BLD CALC: 41 % — SIGNIFICANT CHANGE UP (ref 39–50)
HCT VFR BLDA CALC: 38.8 % — LOW (ref 39–51)
HGB BLD CALC-MCNC: 12.6 G/DL — LOW (ref 13–17)
HGB BLD-MCNC: 13.6 G/DL — SIGNIFICANT CHANGE UP (ref 13–17)
HYALINE CASTS # UR AUTO: 3 /LPF — SIGNIFICANT CHANGE UP (ref 0–7)
IANC: 8.17 K/UL — SIGNIFICANT CHANGE UP (ref 1.5–8.5)
IMM GRANULOCYTES NFR BLD AUTO: 0.7 % — SIGNIFICANT CHANGE UP (ref 0–1.5)
KETONES UR-MCNC: ABNORMAL
LACTATE BLDV-MCNC: 2.7 MMOL/L — HIGH (ref 0.5–2)
LEUKOCYTE ESTERASE UR-ACNC: ABNORMAL
LYMPHOCYTES # BLD AUTO: 0.96 K/UL — LOW (ref 1–3.3)
LYMPHOCYTES # BLD AUTO: 9 % — LOW (ref 13–44)
MAGNESIUM SERPL-MCNC: 1.5 MG/DL — LOW (ref 1.6–2.6)
MCHC RBC-ENTMCNC: 28.1 PG — SIGNIFICANT CHANGE UP (ref 27–34)
MCHC RBC-ENTMCNC: 33.2 GM/DL — SIGNIFICANT CHANGE UP (ref 32–36)
MCV RBC AUTO: 84.7 FL — SIGNIFICANT CHANGE UP (ref 80–100)
MONOCYTES # BLD AUTO: 1.37 K/UL — HIGH (ref 0–0.9)
MONOCYTES NFR BLD AUTO: 12.9 % — SIGNIFICANT CHANGE UP (ref 2–14)
NEUTROPHILS # BLD AUTO: 8.17 K/UL — HIGH (ref 1.8–7.4)
NEUTROPHILS NFR BLD AUTO: 76.5 % — SIGNIFICANT CHANGE UP (ref 43–77)
NITRITE UR-MCNC: NEGATIVE — SIGNIFICANT CHANGE UP
NRBC # BLD: 0 /100 WBCS — SIGNIFICANT CHANGE UP
NRBC # FLD: 0.02 K/UL — HIGH
PCO2 BLDV: 41 MMHG — SIGNIFICANT CHANGE UP (ref 41–51)
PH BLDV: 7.42 — SIGNIFICANT CHANGE UP (ref 7.32–7.43)
PH UR: 6.5 — SIGNIFICANT CHANGE UP (ref 5–8)
PLATELET # BLD AUTO: 468 K/UL — HIGH (ref 150–400)
PO2 BLDV: 24 MMHG — LOW (ref 35–40)
POTASSIUM BLDV-SCNC: 3.4 MMOL/L — SIGNIFICANT CHANGE UP (ref 3.4–4.5)
POTASSIUM SERPL-MCNC: 3.5 MMOL/L — SIGNIFICANT CHANGE UP (ref 3.5–5.3)
POTASSIUM SERPL-SCNC: 3.5 MMOL/L — SIGNIFICANT CHANGE UP (ref 3.5–5.3)
PROT SERPL-MCNC: 7.7 G/DL — SIGNIFICANT CHANGE UP (ref 6–8.3)
PROT UR-MCNC: ABNORMAL
RBC # BLD: 4.84 M/UL — SIGNIFICANT CHANGE UP (ref 4.2–5.8)
RBC # FLD: 14.9 % — HIGH (ref 10.3–14.5)
RBC CASTS # UR COMP ASSIST: 15 /HPF — HIGH (ref 0–4)
RH IG SCN BLD-IMP: POSITIVE — SIGNIFICANT CHANGE UP
SAO2 % BLDV: 37.7 % — LOW (ref 60–85)
SARS-COV-2 RNA SPEC QL NAA+PROBE: SIGNIFICANT CHANGE UP
SODIUM SERPL-SCNC: 136 MMOL/L — SIGNIFICANT CHANGE UP (ref 135–145)
SP GR SPEC: 1.01 — SIGNIFICANT CHANGE UP (ref 1.01–1.02)
UROBILINOGEN FLD QL: ABNORMAL
WBC # BLD: 10.66 K/UL — HIGH (ref 3.8–10.5)
WBC # FLD AUTO: 10.66 K/UL — HIGH (ref 3.8–10.5)
WBC UR QL: >50 /HPF — SIGNIFICANT CHANGE UP (ref 0–5)

## 2021-06-14 PROCEDURE — 99223 1ST HOSP IP/OBS HIGH 75: CPT

## 2021-06-14 PROCEDURE — 71045 X-RAY EXAM CHEST 1 VIEW: CPT | Mod: 26

## 2021-06-14 PROCEDURE — 99285 EMERGENCY DEPT VISIT HI MDM: CPT

## 2021-06-14 RX ORDER — METOPROLOL TARTRATE 50 MG
100 TABLET ORAL DAILY
Refills: 0 | Status: DISCONTINUED | OUTPATIENT
Start: 2021-06-14 | End: 2021-06-14

## 2021-06-14 RX ORDER — SODIUM CHLORIDE 9 MG/ML
1000 INJECTION, SOLUTION INTRAVENOUS
Refills: 0 | Status: DISCONTINUED | OUTPATIENT
Start: 2021-06-14 | End: 2021-06-17

## 2021-06-14 RX ORDER — ACETAMINOPHEN 500 MG
650 TABLET ORAL EVERY 6 HOURS
Refills: 0 | Status: DISCONTINUED | OUTPATIENT
Start: 2021-06-14 | End: 2021-06-17

## 2021-06-14 RX ORDER — PIPERACILLIN AND TAZOBACTAM 4; .5 G/20ML; G/20ML
3.38 INJECTION, POWDER, LYOPHILIZED, FOR SOLUTION INTRAVENOUS ONCE
Refills: 0 | Status: COMPLETED | OUTPATIENT
Start: 2021-06-14 | End: 2021-06-14

## 2021-06-14 RX ORDER — METOPROLOL TARTRATE 50 MG
50 TABLET ORAL DAILY
Refills: 0 | Status: DISCONTINUED | OUTPATIENT
Start: 2021-06-14 | End: 2021-06-17

## 2021-06-14 RX ORDER — SODIUM CHLORIDE 9 MG/ML
2000 INJECTION, SOLUTION INTRAVENOUS ONCE
Refills: 0 | Status: DISCONTINUED | OUTPATIENT
Start: 2021-06-14 | End: 2021-06-14

## 2021-06-14 RX ORDER — FOLIC ACID 0.8 MG
1 TABLET ORAL DAILY
Refills: 0 | Status: DISCONTINUED | OUTPATIENT
Start: 2021-06-14 | End: 2021-06-17

## 2021-06-14 RX ORDER — SODIUM CHLORIDE 9 MG/ML
1000 INJECTION INTRAMUSCULAR; INTRAVENOUS; SUBCUTANEOUS ONCE
Refills: 0 | Status: COMPLETED | OUTPATIENT
Start: 2021-06-14 | End: 2021-06-14

## 2021-06-14 RX ORDER — ALBUTEROL 90 UG/1
2 AEROSOL, METERED ORAL EVERY 6 HOURS
Refills: 0 | Status: DISCONTINUED | OUTPATIENT
Start: 2021-06-14 | End: 2021-06-17

## 2021-06-14 RX ORDER — ACETAMINOPHEN 500 MG
650 TABLET ORAL ONCE
Refills: 0 | Status: COMPLETED | OUTPATIENT
Start: 2021-06-14 | End: 2021-06-14

## 2021-06-14 RX ORDER — LEVETIRACETAM 250 MG/1
250 TABLET, FILM COATED ORAL
Refills: 0 | Status: DISCONTINUED | OUTPATIENT
Start: 2021-06-14 | End: 2021-06-14

## 2021-06-14 RX ORDER — MEROPENEM 1 G/30ML
1000 INJECTION INTRAVENOUS EVERY 8 HOURS
Refills: 0 | Status: DISCONTINUED | OUTPATIENT
Start: 2021-06-14 | End: 2021-06-16

## 2021-06-14 RX ORDER — LEVETIRACETAM 250 MG/1
250 TABLET, FILM COATED ORAL
Refills: 0 | Status: DISCONTINUED | OUTPATIENT
Start: 2021-06-14 | End: 2021-06-17

## 2021-06-14 RX ORDER — HEPARIN SODIUM 5000 [USP'U]/ML
5000 INJECTION INTRAVENOUS; SUBCUTANEOUS EVERY 8 HOURS
Refills: 0 | Status: DISCONTINUED | OUTPATIENT
Start: 2021-06-14 | End: 2021-06-17

## 2021-06-14 RX ORDER — METOPROLOL TARTRATE 50 MG
1 TABLET ORAL
Qty: 0 | Refills: 0 | DISCHARGE

## 2021-06-14 RX ADMIN — Medication 650 MILLIGRAM(S): at 13:20

## 2021-06-14 RX ADMIN — MEROPENEM 100 MILLIGRAM(S): 1 INJECTION INTRAVENOUS at 23:41

## 2021-06-14 RX ADMIN — PIPERACILLIN AND TAZOBACTAM 200 GRAM(S): 4; .5 INJECTION, POWDER, LYOPHILIZED, FOR SOLUTION INTRAVENOUS at 11:45

## 2021-06-14 RX ADMIN — HEPARIN SODIUM 5000 UNIT(S): 5000 INJECTION INTRAVENOUS; SUBCUTANEOUS at 23:41

## 2021-06-14 RX ADMIN — SODIUM CHLORIDE 125 MILLILITER(S): 9 INJECTION, SOLUTION INTRAVENOUS at 16:28

## 2021-06-14 RX ADMIN — MEROPENEM 100 MILLIGRAM(S): 1 INJECTION INTRAVENOUS at 13:52

## 2021-06-14 RX ADMIN — HEPARIN SODIUM 5000 UNIT(S): 5000 INJECTION INTRAVENOUS; SUBCUTANEOUS at 13:53

## 2021-06-14 RX ADMIN — SODIUM CHLORIDE 1000 MILLILITER(S): 9 INJECTION INTRAMUSCULAR; INTRAVENOUS; SUBCUTANEOUS at 11:45

## 2021-06-14 RX ADMIN — LEVETIRACETAM 250 MILLIGRAM(S): 250 TABLET, FILM COATED ORAL at 23:41

## 2021-06-14 RX ADMIN — SODIUM CHLORIDE 1000 MILLILITER(S): 9 INJECTION INTRAMUSCULAR; INTRAVENOUS; SUBCUTANEOUS at 13:35

## 2021-06-14 RX ADMIN — Medication 650 MILLIGRAM(S): at 11:45

## 2021-06-14 NOTE — H&P ADULT - HISTORY OF PRESENT ILLNESS
41y Male w/ recently diagnosed prostate cancer s/p radical prostatectomy.  Operative course c/b 5 units blood loss, s/p 12 units pRBC, 7 units FFP, 3 units platelets, 14L crystalloids, and 1L albumin, admitted to the SICU intubated and for hemorrhage watch. Difficult to wean sedation due to agitation and concern for seizure like activity. Patient developed an ileus. After being transferred to the floor patient pulled out all tubes; lee, NGT, IVs and signed out AMA. Lee was replaced prior to him leaving and he was given Augmentin given fevers and pending cultures.     Patient presents today after having fevers and chills at home. He says he feels overall weakness. He has minimal pain. He has had some intermittent blood in his urine. He reports that he has been tolerating very little PO at home. He also reports positive flatus and had a bowel movement yesterday.     Urine Clx from 6/12 GNR  Blood clx 6/12 Serratia

## 2021-06-14 NOTE — H&P ADULT - ASSESSMENT
41y Male w/ recently diagnosed prostate cancer s/p radical prostatectomy.  Operative course c/b 5 units blood loss, s/p 12 units pRBC, 7 units FFP, 3 units platelets, 14L crystalloids, and 1L albumin, admitted to the SICU intubated and for hemorrhage watch. C/b post op ileus and bacteremia (Serratia).     - CBC and BMP in AM   - Cultures pending   - Continue ertapenem   - Continue regular diet/IVF

## 2021-06-14 NOTE — SBIRT NOTE ADULT - NSSBIRTBRIEFINTDET_GEN_A_CORE
Provided SBIRT services: Full screen positive. Referral to Treatment attempted. Screening results were reviewed with the patient and patient was provided information about healthy guidelines and potential negative consequences associated with level of risk. Motivation and readiness to reduce or stop use was discussed and goals and activities to make changes were suggested/offered. Options discussed for further evaluation and treatment, but referral to treatment was not completed because patient not interested at this time. Provided pt and his mother with tele SBIRT phone line.

## 2021-06-14 NOTE — ED PROVIDER NOTE - CLINICAL SUMMARY MEDICAL DECISION MAKING FREE TEXT BOX
Citlalli Morales, PGY-1: 41y Male hx of recently diagnosed prostate cancer s/p radical prostatectomy (6/4/21), asthma, seizures on keppra, p/w fevers, weakness, decreased PO intake. Left AMA 2d prior, operative course c/b retroperitoneal bleed requiring multiple blood products, BC growing GNRs (Serratia?) sensitivities pending. VS significant for tachycardia and fever, normotensive. suspect sepsis and likely bacteremia, secondary to urinary source vs. infected hematoma. plan for sepsis workup, ct a/p, fluids, zosyn, admission.

## 2021-06-14 NOTE — ED PROVIDER NOTE - ATTENDING CONTRIBUTION TO CARE
The patient is a 41y Male who has a past medical and surgery history of Seizures asthma prostate cancer s/p radical prostatectomy c/b sepsis RP hematoma need multiple tx prbc's  AMA'ed from hospital PTED with fevers weakness and low back pain as descrbed   Vital Signs Last 24 Hrs  T(F): 99.7 HR: 71 BP: 156/84 RR: 16 SpO2: 99% (14 Jun 2021 13:27)   PE: as described; my additions and exceptions are noted in the chart    DATA:  EKG:   LAB: Pending at time of evaluation  Blood Gas Venous - Lactate: 2.7 mmol/L (06-14-21 @ 11:53)                        13.6   10.66 )-----------( 468      ( 14 Jun 2021 11:53 )             41.0   Mean Cell Volume: 84.7 fL (06-14-21 @ 11:53)  Auto Neutrophil %: 76.5 % (06-14-21 @ 11:53)  Auto Eosinophil %: 0.6 % (06-14-21 @ 11:53)  06-14    136  |  97<L>  |  13  ----------------------------<  83  3.5   |  20<L>  |  0.64    Ca    9.4      14 Jun 2021 11:53  Mg     1.5     06-14    TPro  7.7  /  Alb  3.6  /  TBili  0.6  /  DBili  x   /  AST  23  /  ALT  7   /  AlkPhos  62  06-14        IMPRESSION/RISK:  Dx=fever post op patient    Consideration include new UTI vs site infection vs infection of hematoma  Plan  acetaminophen   Tablet .. 650 milliGRAM(s) Oral PRN  heparin   Injectable 5000 Unit(s) SubCutaneous  lactated ringers. 1000 milliLiter(s) (125 mL/Hr) IV Continuous  meropenem  IVPB 1000 milliGRAM(s) IV Intermittent The patient is a 41y Male who has a past medical and surgery history of Seizures asthma prostate cancer s/p radical prostatectomy c/b sepsis RP hematoma need multiple tx prbc's  AMA'ed from hospital PTED with fevers weakness and low back pain as descrbed   Vital Signs Last 24 Hrs  T(F): 99.7 HR: 71 BP: 156/84 RR: 16 SpO2: 99% (14 Jun 2021 13:27)   PE: as described; my additions and exceptions are noted in the chart    DATA:  EKG:   LAB: Pending at time of evaluation  Blood Gas Venous - Lactate: 2.7 mmol/L (06-14-21 @ 11:53)                        13.6   10.66 )-----------( 468      ( 14 Jun 2021 11:53 )             41.0   Mean Cell Volume: 84.7 fL (06-14-21 @ 11:53)  Auto Neutrophil %: 76.5 % (06-14-21 @ 11:53)  Auto Eosinophil %: 0.6 % (06-14-21 @ 11:53)  06-14    136  |  97<L>  |  13  ----------------------------<  83  3.5   |  20<L>  |  0.64    Ca    9.4      14 Jun 2021 11:53  Mg     1.5     06-14    TPro  7.7  /  Alb  3.6  /  TBili  0.6  /  DBili  x   /  AST  23  /  ALT  7   /  AlkPhos  62  06-14        IMPRESSION/RISK:  Dx=fever post op patient    Consideration include new UTI vs site infection vs infection of hematoma  Plan  acetaminophen   Tablet .. 650 milliGRAM(s) Oral PRN  heparin   Injectable 5000 Unit(s) SubCutaneous  lactated ringers. 1000 milliLiter(s) (125 mL/Hr) IV Continuous  meropenem  IVPB 1000 milliGRAM(s) IV Intermittent  TBA Urology

## 2021-06-14 NOTE — ED PROVIDER NOTE - PHYSICAL EXAMINATION
Gen: WDWN, NAD  HEENT: EOMI, no nasal discharge, mucous membranes moist  CV: tachycardic, +S1/S2, no M/R/G  Resp: CTAB, no W/R/R  GI: Abdomen soft non-distended, NTTP, no masses. + healing surgical wound on abdomen, no purulent drainage.   MSK: no LE edema. + R sided lumbar back pain  Neuro: A&Ox4, following commands, moving all four extremities spontaneously  Psych: appropriate mood  : no testicular swelling. + lee with bloody drainage.

## 2021-06-14 NOTE — CONSULT NOTE ADULT - PROBLEM SELECTOR RECOMMENDATION 5
post op management per urology, early ambulation, pain control, diet as tolerates, IVFs, IS, DVT ppx (SC hep)

## 2021-06-14 NOTE — H&P ADULT - NSHPPHYSICALEXAM_GEN_ALL_CORE
Gen: no acute distress   Abd: soft, nontender, nondistended, retention sutures in place, incision c/d/i  : lee secured with clear urine

## 2021-06-14 NOTE — ED PROVIDER NOTE - PROGRESS NOTE DETAILS
Citlalli Morales, PGY-1: urology contacted, will see pt, admission pending urology provider josh. Marcell Hines, PGY 3: spoke with urology and will admit, no imaging per urology due to recent CT.

## 2021-06-14 NOTE — CONSULT NOTE ADULT - SUBJECTIVE AND OBJECTIVE BOX
Patient is a 41y old  Male who presents with a chief complaint of fever, weak    HPI:  41M seizure disorder, alcohol abuse, recently diagnosed prostate cancer, admitted -21 s/p radical prostatectomy.  Operative course c/b 5 units blood loss, s/p 12 units pRBC, 7 units FFP, 3 units platelets, 14L crystalloids, and 1L albumin, admitted to the SICU intubated and for hemorrhage watch. Difficult to wean sedation due to agitation and concern for seizure like activity. Patient developed an ileus. After being transferred to the floor patient pulled out all tubes; lee, NGT, IVs and signed out AMA. Lee was replaced prior to him leaving and he was given Augmentin given fevers and pending cultures.     Patient presents today after having fevers and chills at home. He says he feels overall weakness. He has minimal pain. He has had some intermittent blood in his urine. He reports that he has been tolerating very little PO at home. He also reports positive flatus and had a bowel movement yesterday.     Urine and blood cx from  GNR Serratia.    Allergies:  No Known Allergies    HOME MEDICATIONS:  keppra 250 tid - this is all he says he takes    MEDICATIONS  (STANDING):  folic acid 1 milliGRAM(s) Oral daily  heparin   Injectable 5000 Unit(s) SubCutaneous every 8 hours  lactated ringers. 1000 milliLiter(s) (125 mL/Hr) IV Continuous <Continuous>  levETIRAcetam 250 milliGRAM(s) Oral <User Schedule>  meropenem  IVPB 1000 milliGRAM(s) IV Intermittent every 8 hours  metoprolol tartrate 100 milliGRAM(s) Oral daily    MEDICATIONS  (PRN):  acetaminophen   Tablet .. 650 milliGRAM(s) Oral every 6 hours PRN Mild Pain (1 - 3)  ALBUTerol    90 MICROgram(s) HFA Inhaler 2 Puff(s) Inhalation every 6 hours PRN Shortness of Breath and/or Wheezing    PAST MEDICAL & SURGICAL HISTORY:  Seizure  Asthma  Malignant neoplasm of prostate  Hypertension  History of colon surgery  S/P hernia surgery  H/O laceration of skin    SOCIAL HISTORY:  went through 11th grade, previously worked at a [employer]  drinking since 7 years old, drinks "2-3 beers and 1/2 pint vodka" per day  1/2 ppd    FAMILY HISTORY:  FH: epilepsy (Mother)  [x ] No pertinent family history in first degree relatives     REVIEW OF SYSTEMS:  CONSTITUTIONAL: per HPI  EYES: No eye pain, visual disturbances, or discharge  ENMT:  No difficulty hearing, tinnitus, vertigo; No sinus or throat pain  NECK: No pain or stiffness  BREASTS: No pain, masses, or nipple discharge  RESPIRATORY: No cough, wheezing, chills or hemoptysis; No shortness of breath  CARDIOVASCULAR: No chest pain, palpitations, dizziness, or leg swelling  GASTROINTESTINAL: per HPI No nausea, vomiting, or hematemesis; No diarrhea or constipation. No melena or hematochezia.  GENITOURINARY: per HPI  NEUROLOGICAL: No headaches, memory loss, loss of strength, numbness, or tremors  SKIN: No itching, burning, rashes, or lesions   LYMPH NODES: No enlarged glands  ENDOCRINE: No heat or cold intolerance; No hair loss  MUSCULOSKELETAL: No muscle or back pain  PSYCHIATRIC: No depression, anxiety, mood swings, or difficulty sleeping  HEME/LYMPH: No easy bruising, or bleeding gums  ALLERGY AND IMMUNOLOGIC: No hives or eczema  [  ] All other ROS negative  [  ] Unable to obtain due to poor mental status    Vital Signs Last 24 Hrs  T(C): 36.7 (2021 16:06), Max: 38.2 (2021 10:52)  T(F): 98.1 (2021 16:06), Max: 100.7 (2021 10:52)  HR: 74 (2021 16:06) (71 - 120)  BP: 185/106 (2021 16:06) (138/79 - 185/106)  BP(mean): --  RR: 16 (2021 16:06) (16 - 20)  SpO2: 100% (2021 16:06) (99% - 100%)    PHYSICAL EXAM:  GENERAL: NAD, well-groomed, well-developed  HEAD:  Atraumatic, Normocephalic  EYES: EOMI, PERRLA, conjunctiva and sclera clear  ENMT: Moist mucous membranes  NECK: Supple, No JVD  RESPIRATORY: Clear to auscultation bilaterally; No rales, rhonchi, wheezing, or rubs  CARDIOVASCULAR: Regular rate and rhythm; No murmurs, rubs, or gallops  GASTROINTESTINAL: soft, +BS, surgical site and drain site covered  GENITOURINARY: +lee  EXTREMITIES:  2+ Peripheral Pulses, No clubbing, cyanosis, or edema  NERVOUS SYSTEM:  Alert & Oriented X3; Moving all 4 extremities; No gross sensory deficits  HEME/LYMPH: No lymphadenopathy noted  SKIN: No rashes or lesions  PSYCH: quiet, reserved    LABS:                        13.6   10.66 )-----------( 468      ( 2021 11:53 )             41.0     -14    136  |  97<L>  |  13  ----------------------------<  83  3.5   |  20<L>  |  0.64    Ca    9.4      2021 11:53  Mg     1.5         TPro  7.7  /  Alb  3.6  /  TBili  0.6  /  DBili  x   /  AST  23  /  ALT  7   /  AlkPhos  62      Urinalysis Basic - ( 2021 14:21 )  Color: Yellow / Appearance: Slightly Turbid / S.011 / pH: x  Gluc: x / Ketone: Trace  / Bili: Negative / Urobili: 3 mg/dL   Blood: x / Protein: 30 mg/dL / Nitrite: Negative   Leuk Esterase: Large / RBC: 15 /HPF / WBC >50 /HPF   Sq Epi: x / Non Sq Epi: 0 /HPF / Bacteria: Negative    RADIOLOGY & ADDITIONAL STUDIES:  < from: CT Abdomen and Pelvis w/ Oral Cont and w/ IV Cont (21 @ 17:43) >  LOWER CHEST: Enteric tube in the esophagus with tip in the gastric fundus. Bibasilar dependent atelectasis.    LIVER: Within normal limits.  BILE DUCTS: Normal caliber.  GALLBLADDER: Within normal limits.  SPLEEN: Splenectomy.  PANCREAS: Within normal limits.  ADRENALS: Within normal limits.  KIDNEYS/URETERS: Within normal limits.    BLADDER: Bladder is decompressed by a Lee catheter.  REPRODUCTIVE ORGANS: A walled off gasliquid collection in the prostatectomy bed measures 8.3 cm in transverse dimension, 10.6 cm in AP dimension, and 6.8 cm in craniocaudad dimension.    BOWEL: Sigmoid anastomosis. Right abdominal small bowel anastomosis. Mild diverticulosis. Short segment thickening of the ascending colon. Gastric pneumatosis.  PERITONEUM: Percutaneous drainage catheter is seen with tip in the right lower quadrant  VESSELS: Within normal limits.  RETROPERITONEUM/LYMPH NODES: No lymphadenopathy.  ABDOMINAL WALL: Within normal limits.  BONES: Within normal limits.    IMPRESSION:  Walled off collection in the prostatectomy bed contains gas and liquid. Superimposed infection is not excluded.    Short segment thickening of the ascending colon may reflect acute colitis, however, recommend a follow-up colonoscopy to exclude underlying neoplasm.    No small bowel obstruction.    EKG:   Personally Reviewed:  [ ] YES     Imaging:   Personally Reviewed:  [ ] YES               Consultant(s) notes reviewed:    Care Discussed with Consultant(s)/Other Providers:  urology re overall care Patient is a 41y old  Male who presents with a chief complaint of fever, weak    HPI:  41M seizure disorder, alcohol abuse, recently diagnosed prostate cancer, admitted -21 s/p radical prostatectomy.  Operative course c/b 5 units blood loss, s/p 12 units pRBC, 7 units FFP, 3 units platelets, 14L crystalloids, and 1L albumin, admitted to the SICU intubated and for hemorrhage watch. Difficult to wean sedation due to agitation and concern for seizure like activity. Patient developed an ileus. After being transferred to the floor patient pulled out all tubes; lee, NGT, IVs and signed out AMA. Lee was replaced prior to him leaving and he was given Augmentin given fevers and pending cultures.     Patient presents today after having fevers and chills at home. He says he feels overall weakness. He has minimal pain. He has had some intermittent blood in his urine. He reports that he has been tolerating very little PO at home. He also reports positive flatus and had a bowel movement yesterday.    Reports drank "one drink" since left hospital, denies cravings, shakes, hallucinations.     Urine and blood cx from  GNR Serratia.    Allergies:  No Known Allergies    HOME MEDICATIONS:  keppra 250 tid - this is all he says he takes    MEDICATIONS  (STANDING):  folic acid 1 milliGRAM(s) Oral daily  heparin   Injectable 5000 Unit(s) SubCutaneous every 8 hours  lactated ringers. 1000 milliLiter(s) (125 mL/Hr) IV Continuous <Continuous>  levETIRAcetam 250 milliGRAM(s) Oral <User Schedule>  meropenem  IVPB 1000 milliGRAM(s) IV Intermittent every 8 hours  metoprolol tartrate 100 milliGRAM(s) Oral daily    MEDICATIONS  (PRN):  acetaminophen   Tablet .. 650 milliGRAM(s) Oral every 6 hours PRN Mild Pain (1 - 3)  ALBUTerol    90 MICROgram(s) HFA Inhaler 2 Puff(s) Inhalation every 6 hours PRN Shortness of Breath and/or Wheezing    PAST MEDICAL & SURGICAL HISTORY:  Seizure  Asthma  Malignant neoplasm of prostate  Hypertension  History of colon surgery  S/P hernia surgery  H/O laceration of skin    SOCIAL HISTORY:  went through 11th grade, previously worked at a [employer]  drinking since 7 years old, drinks "2-3 beers and 1/2 pint vodka" per day  1/2 ppd    FAMILY HISTORY:  FH: epilepsy (Mother)  [x ] No pertinent family history in first degree relatives     REVIEW OF SYSTEMS:  CONSTITUTIONAL: per HPI  EYES: No eye pain, visual disturbances, or discharge  ENMT:  No difficulty hearing, tinnitus, vertigo; No sinus or throat pain  NECK: No pain or stiffness  BREASTS: No pain, masses, or nipple discharge  RESPIRATORY: No cough, wheezing, chills or hemoptysis; No shortness of breath  CARDIOVASCULAR: No chest pain, palpitations, dizziness, or leg swelling  GASTROINTESTINAL: per HPI No nausea, vomiting, or hematemesis; No diarrhea or constipation. No melena or hematochezia.  GENITOURINARY: per HPI  NEUROLOGICAL: No headaches, memory loss, loss of strength, numbness, or tremors  SKIN: No itching, burning, rashes, or lesions   LYMPH NODES: No enlarged glands  ENDOCRINE: No heat or cold intolerance; No hair loss  MUSCULOSKELETAL: No muscle or back pain  PSYCHIATRIC: No depression, anxiety, mood swings, or difficulty sleeping  HEME/LYMPH: No easy bruising, or bleeding gums  ALLERGY AND IMMUNOLOGIC: No hives or eczema  [  ] All other ROS negative  [  ] Unable to obtain due to poor mental status    Vital Signs Last 24 Hrs  T(C): 36.7 (2021 16:06), Max: 38.2 (2021 10:52)  T(F): 98.1 (2021 16:06), Max: 100.7 (2021 10:52)  HR: 74 (2021 16:06) (71 - 120)  BP: 185/106 (2021 16:06) (138/79 - 185/106)  BP(mean): --  RR: 16 (2021 16:06) (16 - 20)  SpO2: 100% (2021 16:06) (99% - 100%)    PHYSICAL EXAM:  GENERAL: NAD, well-groomed, well-developed  HEAD:  Atraumatic, Normocephalic  EYES: EOMI, PERRLA, conjunctiva and sclera clear  ENMT: Moist mucous membranes  NECK: Supple, No JVD  RESPIRATORY: Clear to auscultation bilaterally; No rales, rhonchi, wheezing, or rubs  CARDIOVASCULAR: Regular rate and rhythm; No murmurs, rubs, or gallops  GASTROINTESTINAL: soft, +BS, surgical site and drain site covered  GENITOURINARY: +lee  EXTREMITIES:  2+ Peripheral Pulses, No clubbing, cyanosis, or edema  NERVOUS SYSTEM:  Alert & Oriented X3; Moving all 4 extremities; No gross sensory deficits  HEME/LYMPH: No lymphadenopathy noted  SKIN: No rashes or lesions  PSYCH: quiet, reserved    LABS:                        13.6   10.66 )-----------( 468      ( 2021 11:53 )             41.0     06-14    136  |  97<L>  |  13  ----------------------------<  83  3.5   |  20<L>  |  0.64    Ca    9.4      2021 11:53  Mg     1.5         TPro  7.7  /  Alb  3.6  /  TBili  0.6  /  DBili  x   /  AST  23  /  ALT  7   /  AlkPhos  62      Urinalysis Basic - ( 2021 14:21 )  Color: Yellow / Appearance: Slightly Turbid / S.011 / pH: x  Gluc: x / Ketone: Trace  / Bili: Negative / Urobili: 3 mg/dL   Blood: x / Protein: 30 mg/dL / Nitrite: Negative   Leuk Esterase: Large / RBC: 15 /HPF / WBC >50 /HPF   Sq Epi: x / Non Sq Epi: 0 /HPF / Bacteria: Negative    RADIOLOGY & ADDITIONAL STUDIES:  < from: CT Abdomen and Pelvis w/ Oral Cont and w/ IV Cont (21 @ 17:43) >  LOWER CHEST: Enteric tube in the esophagus with tip in the gastric fundus. Bibasilar dependent atelectasis.    LIVER: Within normal limits.  BILE DUCTS: Normal caliber.  GALLBLADDER: Within normal limits.  SPLEEN: Splenectomy.  PANCREAS: Within normal limits.  ADRENALS: Within normal limits.  KIDNEYS/URETERS: Within normal limits.    BLADDER: Bladder is decompressed by a Lee catheter.  REPRODUCTIVE ORGANS: A walled off gasliquid collection in the prostatectomy bed measures 8.3 cm in transverse dimension, 10.6 cm in AP dimension, and 6.8 cm in craniocaudad dimension.    BOWEL: Sigmoid anastomosis. Right abdominal small bowel anastomosis. Mild diverticulosis. Short segment thickening of the ascending colon. Gastric pneumatosis.  PERITONEUM: Percutaneous drainage catheter is seen with tip in the right lower quadrant  VESSELS: Within normal limits.  RETROPERITONEUM/LYMPH NODES: No lymphadenopathy.  ABDOMINAL WALL: Within normal limits.  BONES: Within normal limits.    IMPRESSION:  Walled off collection in the prostatectomy bed contains gas and liquid. Superimposed infection is not excluded.    Short segment thickening of the ascending colon may reflect acute colitis, however, recommend a follow-up colonoscopy to exclude underlying neoplasm.    No small bowel obstruction.    EKG:   Personally Reviewed:  [ ] YES     Imaging:   Personally Reviewed:  [ ] YES               Consultant(s) notes reviewed:    Care Discussed with Consultant(s)/Other Providers:  urology re overall care

## 2021-06-14 NOTE — ED CLERICAL - NS ED CLERK NOTE PRE-ARRIVAL INFORMATION; ADDITIONAL PRE-ARRIVAL INFORMATION
This patient is enrolled in the [facility] Care Transitions program and has active care navigation. This patient can be followed up by the care navigation team within 24 hours. To arrange close follow-up or to obtain additional clinical information about this patient, please call the contact number above.

## 2021-06-14 NOTE — CONSULT NOTE ADULT - NSPROBSELRECBLANK_5_GEN
DISPLAY PLAN FREE TEXT How Severe Is Your Skin Lesion?: mild Has Your Skin Lesion Been Treated?: not been treated Is This A New Presentation, Or A Follow-Up?: Skin Lesions

## 2021-06-14 NOTE — ED PROVIDER NOTE - OBJECTIVE STATEMENT
41y Male w/ recently diagnosed prostate cancer s/p radical prostatectomy, operative course c/b retroperitoneal bleed requiring multiple blood products,       5 units blood loss, s/p 12 units pRBC, 7 units FFP, 3 units platelets, 14L crystalloids, and 1L albumin, admitted to the SICU intubated and for hemorrhage watch. 41y Male hx of recently diagnosed prostate cancer s/p radical prostatectomy (6/4/21), asthma, seizures on keppra, p/w fevers, weakness, decreased PO intake. Left AMA 2d prior, operative course c/b retroperitoneal bleed requiring multiple blood products, BC growing GNRs (Serratia?) sensitivities pending. Pt currently endorses R sided lower back pain, penile and testicular pain, generalized weakness. has not taken antibiotics at home, was on zosyn inpatient. pt with jesus, states the urine is becoming more clear over time but still bloody.

## 2021-06-14 NOTE — ED PROVIDER NOTE - NSFOLLOWUPINSTRUCTIONS_ED_ALL_ED_FT
The patient is a 41y Male who has a past medical and surgery history of Seizures asthma prostate cancer s/p radical prostatectomy c/b sepsis RP hematoma need multiple tx prbc's  AMA'ed from hospital PTED with fevers weakness and low back pain as descrbed   Vital Signs Last 24 Hrs  T(F): 99.7 HR: 71 BP: 156/84 RR: 16 SpO2: 99% (14 Jun 2021 13:27)   PE: as described; my additions and exceptions are noted in the chart    DATA:  EKG:   LAB: Pending at time of evaluation  Blood Gas Venous - Lactate: 2.7 mmol/L (06-14-21 @ 11:53)                        13.6   10.66 )-----------( 468      ( 14 Jun 2021 11:53 )             41.0   Mean Cell Volume: 84.7 fL (06-14-21 @ 11:53)  Auto Neutrophil %: 76.5 % (06-14-21 @ 11:53)  Auto Eosinophil %: 0.6 % (06-14-21 @ 11:53)  06-14    136  |  97<L>  |  13  ----------------------------<  83  3.5   |  20<L>  |  0.64    Ca    9.4      14 Jun 2021 11:53  Mg     1.5     06-14    TPro  7.7  /  Alb  3.6  /  TBili  0.6  /  DBili  x   /  AST  23  /  ALT  7   /  AlkPhos  62  06-14        IMPRESSION/RISK:  Dx=  Differential includes but not limited to conditions listed in order of most possible first:   Consideration include  Plan  acetaminophen   Tablet .. 650 milliGRAM(s) Oral PRN  heparin   Injectable 5000 Unit(s) SubCutaneous  lactated ringers. 1000 milliLiter(s) (125 mL/Hr) IV Continuous  meropenem  IVPB 1000 milliGRAM(s) IV Intermittent

## 2021-06-14 NOTE — ED ADULT NURSE NOTE - OBJECTIVE STATEMENT
pt arrives to  23 in the adult ed c/o fever, weakness and pain to penis (10/10) radiating to testicles and posterior lower back, 10/10, non radiating. pt had surgery on 6/4/21, for prostate ca. left AMA. returning due to increasing symptoms. A&Ox4. denies sob, dizziness, cp, n/v/d. hoarse voice noted. negative drooling. positive ability to swallow. respirations are even and unlabored. surgical incision noted on abd. negative swelling, foul odor or pain. dried green drainage noted on gauze dressing. no active drainage.  urine catheter placed after surgery. negative abd distention. positive bowel sounds. dark sudheer urine noted. negative pain upon urination. equal strength in all extremities. steady gait. 20G placed in left arm and 20g placed on right arm. placed on cardiac monitor, sinus tach. labs drawn & medications given as per MD orders. safety precautions maintained. will continue to monitor.

## 2021-06-14 NOTE — CONSULT NOTE ADULT - ASSESSMENT
41M seizure disorder, alcohol abuse, recently diagnosed prostate cancer, admitted 6/4-6/12/21 s/p radical prostatectomy.  Operative course c/b 5 units blood loss, s/p 12 units pRBC, 7 units FFP, 3 units platelets, 14L crystalloids, and 1L albumin, admitted to the SICU intubated and for hemorrhage watch. Difficult to wean sedation due to agitation and concern for seizure like activity. Patient developed an ileus. Signed out AMA with lee.  Now returns with fever, chills, weakness.  Blood and urine cultures from 6/12 Serratia

## 2021-06-14 NOTE — H&P ADULT - ATTENDING COMMENTS
Agree with above.  IV abx, await final culture results  Regular diet, ambulate.  White to gravity drainage.

## 2021-06-14 NOTE — PHARMACOTHERAPY INTERVENTION NOTE - COMMENTS
Medication history is complete. Medication list updated in Outpatient Medication Record ([facility]). Please call spectra [phone] if you have any questions.

## 2021-06-14 NOTE — ED PROVIDER NOTE - NS ED ROS FT
Gen: + fevers  CV: Denies chest pain  Skin: denies color changes  Resp: + chronic cough  Endo: Denies increased urination  GI: Denies nausea, vomiting. + decreased PO intake  Msk: + R sided back pain  : + bloody urine draining from lee  Neuro: + generalized weakness

## 2021-06-14 NOTE — ED ADULT TRIAGE NOTE - CHIEF COMPLAINT QUOTE
Pt was treated for prostate cancer on 6/4. Pt signed out ama on Saturday. pt returns for weakness decreased appetite and low grade fever. Pt with White cathter.

## 2021-06-15 LAB
-  AMIKACIN: SIGNIFICANT CHANGE UP
-  AMIKACIN: SIGNIFICANT CHANGE UP
-  AMOXICILLIN/CLAVULANIC ACID: SIGNIFICANT CHANGE UP
-  AMPICILLIN/SULBACTAM: SIGNIFICANT CHANGE UP
-  AMPICILLIN/SULBACTAM: SIGNIFICANT CHANGE UP
-  AMPICILLIN: SIGNIFICANT CHANGE UP
-  AMPICILLIN: SIGNIFICANT CHANGE UP
-  AZTREONAM: SIGNIFICANT CHANGE UP
-  AZTREONAM: SIGNIFICANT CHANGE UP
-  CEFAZOLIN: SIGNIFICANT CHANGE UP
-  CEFAZOLIN: SIGNIFICANT CHANGE UP
-  CEFEPIME: SIGNIFICANT CHANGE UP
-  CEFEPIME: SIGNIFICANT CHANGE UP
-  CEFOXITIN: SIGNIFICANT CHANGE UP
-  CEFOXITIN: SIGNIFICANT CHANGE UP
-  CEFTRIAXONE: SIGNIFICANT CHANGE UP
-  CEFTRIAXONE: SIGNIFICANT CHANGE UP
-  CIPROFLOXACIN: SIGNIFICANT CHANGE UP
-  CIPROFLOXACIN: SIGNIFICANT CHANGE UP
-  ERTAPENEM: SIGNIFICANT CHANGE UP
-  ERTAPENEM: SIGNIFICANT CHANGE UP
-  GENTAMICIN: SIGNIFICANT CHANGE UP
-  GENTAMICIN: SIGNIFICANT CHANGE UP
-  LEVOFLOXACIN: SIGNIFICANT CHANGE UP
-  LEVOFLOXACIN: SIGNIFICANT CHANGE UP
-  MEROPENEM: SIGNIFICANT CHANGE UP
-  MEROPENEM: SIGNIFICANT CHANGE UP
-  NITROFURANTOIN: SIGNIFICANT CHANGE UP
-  PIPERACILLIN/TAZOBACTAM: SIGNIFICANT CHANGE UP
-  PIPERACILLIN/TAZOBACTAM: SIGNIFICANT CHANGE UP
-  TIGECYCLINE: SIGNIFICANT CHANGE UP
-  TOBRAMYCIN: SIGNIFICANT CHANGE UP
-  TOBRAMYCIN: SIGNIFICANT CHANGE UP
-  TRIMETHOPRIM/SULFAMETHOXAZOLE: SIGNIFICANT CHANGE UP
-  TRIMETHOPRIM/SULFAMETHOXAZOLE: SIGNIFICANT CHANGE UP
ALBUMIN SERPL ELPH-MCNC: 3.1 G/DL — LOW (ref 3.3–5)
ALP SERPL-CCNC: 48 U/L — SIGNIFICANT CHANGE UP (ref 40–120)
ALT FLD-CCNC: 5 U/L — SIGNIFICANT CHANGE UP (ref 4–41)
ANION GAP SERPL CALC-SCNC: 13 MMOL/L — SIGNIFICANT CHANGE UP (ref 7–14)
ANION GAP SERPL CALC-SCNC: 13 MMOL/L — SIGNIFICANT CHANGE UP (ref 7–14)
AST SERPL-CCNC: 13 U/L — SIGNIFICANT CHANGE UP (ref 4–40)
BILIRUB DIRECT SERPL-MCNC: <0.2 MG/DL — SIGNIFICANT CHANGE UP (ref 0–0.2)
BILIRUB INDIRECT FLD-MCNC: >0.3 MG/DL — SIGNIFICANT CHANGE UP (ref 0–1)
BILIRUB SERPL-MCNC: 0.5 MG/DL — SIGNIFICANT CHANGE UP (ref 0.2–1.2)
BUN SERPL-MCNC: 7 MG/DL — SIGNIFICANT CHANGE UP (ref 7–23)
BUN SERPL-MCNC: 8 MG/DL — SIGNIFICANT CHANGE UP (ref 7–23)
CALCIUM SERPL-MCNC: 8.9 MG/DL — SIGNIFICANT CHANGE UP (ref 8.4–10.5)
CALCIUM SERPL-MCNC: 8.9 MG/DL — SIGNIFICANT CHANGE UP (ref 8.4–10.5)
CHLORIDE SERPL-SCNC: 99 MMOL/L — SIGNIFICANT CHANGE UP (ref 98–107)
CHLORIDE SERPL-SCNC: 99 MMOL/L — SIGNIFICANT CHANGE UP (ref 98–107)
CO2 SERPL-SCNC: 24 MMOL/L — SIGNIFICANT CHANGE UP (ref 22–31)
CO2 SERPL-SCNC: 26 MMOL/L — SIGNIFICANT CHANGE UP (ref 22–31)
CREAT SERPL-MCNC: 0.49 MG/DL — LOW (ref 0.5–1.3)
CREAT SERPL-MCNC: 0.53 MG/DL — SIGNIFICANT CHANGE UP (ref 0.5–1.3)
CULTURE RESULTS: SIGNIFICANT CHANGE UP
GLUCOSE SERPL-MCNC: 107 MG/DL — HIGH (ref 70–99)
GLUCOSE SERPL-MCNC: 118 MG/DL — HIGH (ref 70–99)
HCT VFR BLD CALC: 37.8 % — LOW (ref 39–50)
HGB BLD-MCNC: 12.6 G/DL — LOW (ref 13–17)
MAGNESIUM SERPL-MCNC: 1.1 MG/DL — LOW (ref 1.6–2.6)
MAGNESIUM SERPL-MCNC: 1.6 MG/DL — SIGNIFICANT CHANGE UP (ref 1.6–2.6)
MCHC RBC-ENTMCNC: 28 PG — SIGNIFICANT CHANGE UP (ref 27–34)
MCHC RBC-ENTMCNC: 33.3 GM/DL — SIGNIFICANT CHANGE UP (ref 32–36)
MCV RBC AUTO: 84 FL — SIGNIFICANT CHANGE UP (ref 80–100)
METHOD TYPE: SIGNIFICANT CHANGE UP
METHOD TYPE: SIGNIFICANT CHANGE UP
NRBC # BLD: 0 /100 WBCS — SIGNIFICANT CHANGE UP
NRBC # FLD: 0 K/UL — SIGNIFICANT CHANGE UP
ORGANISM # SPEC MICROSCOPIC CNT: SIGNIFICANT CHANGE UP
PHOSPHATE SERPL-MCNC: 2.8 MG/DL — SIGNIFICANT CHANGE UP (ref 2.5–4.5)
PHOSPHATE SERPL-MCNC: 3.2 MG/DL — SIGNIFICANT CHANGE UP (ref 2.5–4.5)
PLATELET # BLD AUTO: 555 K/UL — HIGH (ref 150–400)
POTASSIUM SERPL-MCNC: 2.9 MMOL/L — CRITICAL LOW (ref 3.5–5.3)
POTASSIUM SERPL-MCNC: 3.4 MMOL/L — LOW (ref 3.5–5.3)
POTASSIUM SERPL-SCNC: 2.9 MMOL/L — CRITICAL LOW (ref 3.5–5.3)
POTASSIUM SERPL-SCNC: 3.4 MMOL/L — LOW (ref 3.5–5.3)
PROT SERPL-MCNC: 6.1 G/DL — SIGNIFICANT CHANGE UP (ref 6–8.3)
RBC # BLD: 4.5 M/UL — SIGNIFICANT CHANGE UP (ref 4.2–5.8)
RBC # FLD: 14.9 % — HIGH (ref 10.3–14.5)
SODIUM SERPL-SCNC: 136 MMOL/L — SIGNIFICANT CHANGE UP (ref 135–145)
SODIUM SERPL-SCNC: 138 MMOL/L — SIGNIFICANT CHANGE UP (ref 135–145)
SPECIMEN SOURCE: SIGNIFICANT CHANGE UP
WBC # BLD: 9.27 K/UL — SIGNIFICANT CHANGE UP (ref 3.8–10.5)
WBC # FLD AUTO: 9.27 K/UL — SIGNIFICANT CHANGE UP (ref 3.8–10.5)

## 2021-06-15 PROCEDURE — 99231 SBSQ HOSP IP/OBS SF/LOW 25: CPT

## 2021-06-15 PROCEDURE — 99233 SBSQ HOSP IP/OBS HIGH 50: CPT

## 2021-06-15 RX ORDER — POTASSIUM CHLORIDE 20 MEQ
10 PACKET (EA) ORAL ONCE
Refills: 0 | Status: COMPLETED | OUTPATIENT
Start: 2021-06-15 | End: 2021-06-15

## 2021-06-15 RX ORDER — POTASSIUM CHLORIDE 20 MEQ
10 PACKET (EA) ORAL
Refills: 0 | Status: COMPLETED | OUTPATIENT
Start: 2021-06-15 | End: 2021-06-15

## 2021-06-15 RX ORDER — POTASSIUM CHLORIDE 20 MEQ
40 PACKET (EA) ORAL ONCE
Refills: 0 | Status: COMPLETED | OUTPATIENT
Start: 2021-06-15 | End: 2021-06-15

## 2021-06-15 RX ORDER — HYDROMORPHONE HYDROCHLORIDE 2 MG/ML
1 INJECTION INTRAMUSCULAR; INTRAVENOUS; SUBCUTANEOUS ONCE
Refills: 0 | Status: DISCONTINUED | OUTPATIENT
Start: 2021-06-15 | End: 2021-06-15

## 2021-06-15 RX ORDER — MAGNESIUM SULFATE 500 MG/ML
2 VIAL (ML) INJECTION ONCE
Refills: 0 | Status: COMPLETED | OUTPATIENT
Start: 2021-06-15 | End: 2021-06-15

## 2021-06-15 RX ADMIN — Medication 1 MILLIGRAM(S): at 12:20

## 2021-06-15 RX ADMIN — LEVETIRACETAM 250 MILLIGRAM(S): 250 TABLET, FILM COATED ORAL at 19:53

## 2021-06-15 RX ADMIN — HEPARIN SODIUM 5000 UNIT(S): 5000 INJECTION INTRAVENOUS; SUBCUTANEOUS at 21:15

## 2021-06-15 RX ADMIN — SODIUM CHLORIDE 125 MILLILITER(S): 9 INJECTION, SOLUTION INTRAVENOUS at 21:15

## 2021-06-15 RX ADMIN — MEROPENEM 100 MILLIGRAM(S): 1 INJECTION INTRAVENOUS at 07:36

## 2021-06-15 RX ADMIN — Medication 40 MILLIEQUIVALENT(S): at 22:21

## 2021-06-15 RX ADMIN — SODIUM CHLORIDE 125 MILLILITER(S): 9 INJECTION, SOLUTION INTRAVENOUS at 02:37

## 2021-06-15 RX ADMIN — Medication 100 MILLIEQUIVALENT(S): at 12:20

## 2021-06-15 RX ADMIN — Medication 100 MILLIEQUIVALENT(S): at 10:55

## 2021-06-15 RX ADMIN — HYDROMORPHONE HYDROCHLORIDE 1 MILLIGRAM(S): 2 INJECTION INTRAMUSCULAR; INTRAVENOUS; SUBCUTANEOUS at 00:49

## 2021-06-15 RX ADMIN — LEVETIRACETAM 250 MILLIGRAM(S): 250 TABLET, FILM COATED ORAL at 14:42

## 2021-06-15 RX ADMIN — ALBUTEROL 2 PUFF(S): 90 AEROSOL, METERED ORAL at 22:31

## 2021-06-15 RX ADMIN — HEPARIN SODIUM 5000 UNIT(S): 5000 INJECTION INTRAVENOUS; SUBCUTANEOUS at 05:17

## 2021-06-15 RX ADMIN — Medication 50 MILLIGRAM(S): at 05:17

## 2021-06-15 RX ADMIN — LEVETIRACETAM 250 MILLIGRAM(S): 250 TABLET, FILM COATED ORAL at 05:17

## 2021-06-15 RX ADMIN — Medication 50 GRAM(S): at 16:50

## 2021-06-15 RX ADMIN — HEPARIN SODIUM 5000 UNIT(S): 5000 INJECTION INTRAVENOUS; SUBCUTANEOUS at 14:41

## 2021-06-15 RX ADMIN — Medication 100 MILLIEQUIVALENT(S): at 14:41

## 2021-06-15 RX ADMIN — MEROPENEM 100 MILLIGRAM(S): 1 INJECTION INTRAVENOUS at 16:04

## 2021-06-15 RX ADMIN — Medication 100 MILLIEQUIVALENT(S): at 22:21

## 2021-06-15 NOTE — PROGRESS NOTE ADULT - SUBJECTIVE AND OBJECTIVE BOX
No events overnite  Tmax 100.7 yesterday at noon  Afeb 155/86 70 97%RA    Pt has no c/o  Abd- soft NT ND; + flatus +BM       midline inc/retention sutures C&D  Cindy 575  PANCHO site - dry  Alex reg diet

## 2021-06-15 NOTE — PROGRESS NOTE ADULT - SUBJECTIVE AND OBJECTIVE BOX
[facility] Division of Hospital Medicine  Ludmila Evangelista MD  Pager (M-F, 8A-5P):  In-house pager [phone]; Long-range pager [phone]  Other Times:  Please page Hospitalist in Charge -  In-house pager [phone]    Patient is a 41y old  Male who presents with a chief complaint of previously left AMA after surgery (2021 18:21)    SUBJECTIVE / OVERNIGHT EVENTS:  Pt seen earlier, resting in bed, tired, didn't get upstairs from ED until early this morning so didn't get much sleep.  Feels generally weak, not much appetite, +flatus, pain controlled.  ADDITIONAL REVIEW OF SYSTEMS:    MEDICATIONS  (STANDING):  folic acid 1 milliGRAM(s) Oral daily  heparin   Injectable 5000 Unit(s) SubCutaneous every 8 hours  lactated ringers. 1000 milliLiter(s) (125 mL/Hr) IV Continuous <Continuous>  levETIRAcetam 250 milliGRAM(s) Oral <User Schedule>  magnesium sulfate  IVPB 2 Gram(s) IV Intermittent once  meropenem  IVPB 1000 milliGRAM(s) IV Intermittent every 8 hours  metoprolol succinate ER 50 milliGRAM(s) Oral daily    MEDICATIONS  (PRN):  acetaminophen   Tablet .. 650 milliGRAM(s) Oral every 6 hours PRN Mild Pain (1 - 3)  ALBUTerol    90 MICROgram(s) HFA Inhaler 2 Puff(s) Inhalation every 6 hours PRN Shortness of Breath and/or Wheezing  LORazepam   Injectable 2 milliGRAM(s) IV Push every 2 hours PRN CIWA-Ar score increase by 2 points and a total score of 7 or less    I&O's Summary    2021 07:  -  15 Chato 2021 07:00  --------------------------------------------------------  IN: 550 mL / OUT: 1150 mL / NET: -600 mL    15 Chato 2021 07:  -  15 Chato 2021 16:08  --------------------------------------------------------  IN: 0 mL / OUT: 300 mL / NET: -300 mL    PHYSICAL EXAM:  Vital Signs Last 24 Hrs  T(C): 37.1 (15 Chato 2021 14:40), Max: 37.7 (15 Chato 2021 05:11)  T(F): 98.8 (15 Chato 2021 14:40), Max: 99.8 (15 Chato 2021 05:11)  HR: 77 (15 Chato 2021 14:40) (61 - 93)  BP: 147/80 (15 Chato 2021 14:40) (134/84 - 183/101)  BP(mean): --  RR: 19 (15 Chato 2021 14:40) (16 - 19)  SpO2: 98% (15 Chato 2021 14:40) (95% - 98%)    GENERAL: thin BM, lying flat in bed, looks tired  hoarse voice  RESPIRATORY: Clear to auscultation bilaterally  CARDIOVASCULAR: Regular rate and rhythm; No murmurs, rubs, or gallops  GASTROINTESTINAL: soft, +BS, surgical site covered  GENITOURINARY: +lee  EXTREMITIES:  2+ Peripheral Pulses, No clubbing, cyanosis, or edema  NERVOUS SYSTEM:  nonfocal  SKIN: No rashes or lesions  PSYCH: calm    LABS:                        12.6   9.27  )-----------( 555      ( 15 Chato 2021 10:09 )             37.8     06-15    136  |  99  |  7   ----------------------------<  107<H>  2.9<LL>   |  24  |  0.49<L>    Ca    8.9      15 Chato 2021 10:09  Phos  3.2     06-15  Mg     1.1     06-15    TPro  6.1  /  Alb  3.1<L>  /  TBili  0.5  /  DBili  <0.2  /  AST  13  /  ALT  5   /  AlkPhos  48  06-15    Urinalysis Basic - ( 2021 14:21 )  Color: Yellow / Appearance: Slightly Turbid / S.011 / pH: x  Gluc: x / Ketone: Trace  / Bili: Negative / Urobili: 3 mg/dL   Blood: x / Protein: 30 mg/dL / Nitrite: Negative   Leuk Esterase: Large / RBC: 15 /HPF / WBC >50 /HPF   Sq Epi: x / Non Sq Epi: 0 /HPF / Bacteria: Negative    Culture - Urine (collected 2021 15:32)  Source: .Urine Clean Catch (Midstream)  Final Report (15 Chato 2021 15:14):    <10,000 CFU/mL Normal Urogenital Selam    Culture - Blood (collected 2021 14:48)  Source: .Blood Blood-Peripheral  Preliminary Report (15 Chato 2021 15:02):    No growth to date.    Culture - Blood (collected 2021 14:48)  Source: .Blood Blood-Peripheral  Preliminary Report (15 Chato 2021 15:02):    No growth to date.    RADIOLOGY & ADDITIONAL TESTS:  Results Reviewed:   Imaging Personally Reviewed:  Electrocardiogram Personally Reviewed:    COORDINATION OF CARE:  Care Discussed with Consultants/Other Providers [Y/N]: urology re overall care  Prior or Outpatient Records Reviewed [Y/N]:

## 2021-06-15 NOTE — ED ADULT NURSE REASSESSMENT NOTE - NS ED NURSE REASSESS COMMENT FT1
Pt currently resting in bed. NSR on tele. IV fluids running as ordered. Pending inpatient bed placement. Will continue to monitor.
alert no distress resting quietly  report given to camilia will transfer to 920
alert oriented Blood Pressure elevated Dr Maldonado aware  c/o 7/10 bladder pain medicated as ordered if Blood Pressure is still elevated Dr Maldonado will medicate on floor
pt states pain is 5/10 in lower back and penis, on-radiating. urinary catheter bag drained. urine labs sent as per md orders. will continue to monitor.

## 2021-06-15 NOTE — PROVIDER CONTACT NOTE (CRITICAL VALUE NOTIFICATION) - BACKGROUND
6/4 Radical prostatectomy- s/p intubation for blood loss and ileus.   Signed out AMA 6/12.   ED readmit 6/14 for fevers and unable to tolerate PO intake.

## 2021-06-15 NOTE — PROGRESS NOTE ADULT - ATTENDING COMMENTS
Feels well this am.  Continue IV abx as per ID on meropenem.  Await final blood culture results and sensitivities  Continue regular diet  Ambulate  Wound care.

## 2021-06-15 NOTE — PROGRESS NOTE ADULT - ASSESSMENT
41M seizure disorder, alcohol abuse, recently diagnosed prostate cancer, admitted 6/4-6/12/21 s/p radical prostatectomy.  Operative course c/b 5 units blood loss, s/p 12 units pRBC, 7 units FFP, 3 units platelets, 14L crystalloids, and 1L albumin, admitted to the SICU intubated and for hemorrhage watch. Difficult to wean sedation due to agitation and concern for seizure like activity. Patient developed an ileus. Signed out AMA with lee.  Now returns with fever, chills, weakness.  Blood and urine cultures from 6/12 Serratia.

## 2021-06-15 NOTE — PROGRESS NOTE ADULT - PROBLEM SELECTOR PLAN 5
post op management per urology, encourage ambulation, pain control, diet as tolerates, IVFs, IS, DVT ppx (SC hep).

## 2021-06-15 NOTE — PROGRESS NOTE ADULT - ASSESSMENT
This 42 yo M had open rad prostatectomy 6/4; large blood loss; in SICU intubated for 6 days; extubated, sent to floor, signed out AMA 6/12; prob ETOH; readmitted with fever  6/15 - no new events; cultures pending    Plan:  - cont meropenem  - check cultures  - OOB  - CIWA

## 2021-06-16 LAB
ANION GAP SERPL CALC-SCNC: 17 MMOL/L — HIGH (ref 7–14)
BUN SERPL-MCNC: 6 MG/DL — LOW (ref 7–23)
CALCIUM SERPL-MCNC: 8.9 MG/DL — SIGNIFICANT CHANGE UP (ref 8.4–10.5)
CHLORIDE SERPL-SCNC: 98 MMOL/L — SIGNIFICANT CHANGE UP (ref 98–107)
CO2 SERPL-SCNC: 21 MMOL/L — LOW (ref 22–31)
CREAT SERPL-MCNC: 0.49 MG/DL — LOW (ref 0.5–1.3)
GLUCOSE SERPL-MCNC: 95 MG/DL — SIGNIFICANT CHANGE UP (ref 70–99)
MAGNESIUM SERPL-MCNC: 1.5 MG/DL — LOW (ref 1.6–2.6)
PHOSPHATE SERPL-MCNC: 3.6 MG/DL — SIGNIFICANT CHANGE UP (ref 2.5–4.5)
POTASSIUM SERPL-MCNC: 4.5 MMOL/L — SIGNIFICANT CHANGE UP (ref 3.5–5.3)
POTASSIUM SERPL-SCNC: 4.5 MMOL/L — SIGNIFICANT CHANGE UP (ref 3.5–5.3)
SODIUM SERPL-SCNC: 136 MMOL/L — SIGNIFICANT CHANGE UP (ref 135–145)

## 2021-06-16 PROCEDURE — 99231 SBSQ HOSP IP/OBS SF/LOW 25: CPT

## 2021-06-16 PROCEDURE — 74176 CT ABD & PELVIS W/O CONTRAST: CPT | Mod: 26

## 2021-06-16 PROCEDURE — 99232 SBSQ HOSP IP/OBS MODERATE 35: CPT

## 2021-06-16 RX ORDER — OXYCODONE HYDROCHLORIDE 5 MG/1
5 TABLET ORAL EVERY 4 HOURS
Refills: 0 | Status: DISCONTINUED | OUTPATIENT
Start: 2021-06-16 | End: 2021-06-17

## 2021-06-16 RX ORDER — CIPROFLOXACIN LACTATE 400MG/40ML
500 VIAL (ML) INTRAVENOUS EVERY 12 HOURS
Refills: 0 | Status: DISCONTINUED | OUTPATIENT
Start: 2021-06-16 | End: 2021-06-17

## 2021-06-16 RX ORDER — OXYCODONE HYDROCHLORIDE 5 MG/1
10 TABLET ORAL EVERY 4 HOURS
Refills: 0 | Status: DISCONTINUED | OUTPATIENT
Start: 2021-06-16 | End: 2021-06-17

## 2021-06-16 RX ORDER — MAGNESIUM SULFATE 500 MG/ML
2 VIAL (ML) INJECTION ONCE
Refills: 0 | Status: COMPLETED | OUTPATIENT
Start: 2021-06-16 | End: 2021-06-16

## 2021-06-16 RX ADMIN — Medication 650 MILLIGRAM(S): at 00:53

## 2021-06-16 RX ADMIN — HEPARIN SODIUM 5000 UNIT(S): 5000 INJECTION INTRAVENOUS; SUBCUTANEOUS at 13:12

## 2021-06-16 RX ADMIN — LEVETIRACETAM 250 MILLIGRAM(S): 250 TABLET, FILM COATED ORAL at 05:33

## 2021-06-16 RX ADMIN — MEROPENEM 100 MILLIGRAM(S): 1 INJECTION INTRAVENOUS at 00:17

## 2021-06-16 RX ADMIN — HEPARIN SODIUM 5000 UNIT(S): 5000 INJECTION INTRAVENOUS; SUBCUTANEOUS at 20:59

## 2021-06-16 RX ADMIN — OXYCODONE HYDROCHLORIDE 10 MILLIGRAM(S): 5 TABLET ORAL at 18:53

## 2021-06-16 RX ADMIN — Medication 50 GRAM(S): at 10:44

## 2021-06-16 RX ADMIN — HEPARIN SODIUM 5000 UNIT(S): 5000 INJECTION INTRAVENOUS; SUBCUTANEOUS at 05:33

## 2021-06-16 RX ADMIN — LEVETIRACETAM 250 MILLIGRAM(S): 250 TABLET, FILM COATED ORAL at 13:12

## 2021-06-16 RX ADMIN — Medication 500 MILLIGRAM(S): at 21:00

## 2021-06-16 RX ADMIN — Medication 500 MILLIGRAM(S): at 10:44

## 2021-06-16 RX ADMIN — LEVETIRACETAM 250 MILLIGRAM(S): 250 TABLET, FILM COATED ORAL at 19:36

## 2021-06-16 RX ADMIN — OXYCODONE HYDROCHLORIDE 10 MILLIGRAM(S): 5 TABLET ORAL at 12:32

## 2021-06-16 RX ADMIN — Medication 1 MILLIGRAM(S): at 11:04

## 2021-06-16 RX ADMIN — Medication 50 MILLIGRAM(S): at 05:33

## 2021-06-16 RX ADMIN — OXYCODONE HYDROCHLORIDE 10 MILLIGRAM(S): 5 TABLET ORAL at 19:58

## 2021-06-16 RX ADMIN — OXYCODONE HYDROCHLORIDE 10 MILLIGRAM(S): 5 TABLET ORAL at 11:49

## 2021-06-16 NOTE — PROGRESS NOTE ADULT - PROBLEM SELECTOR PLAN 1
on meropenem, f/u blood cx sens, repeat blood cultures for clearance. on meropenem, team to d/w ID if cipro appropriate  repeat blood cultures NGTD

## 2021-06-16 NOTE — PROGRESS NOTE ADULT - PROBLEM SELECTOR PLAN 5
post op management per urology, encourage ambulation, pain control, diet as tolerates, IVFs, IS, DVT ppx (SC hep). post op management per urology, encourage ambulation, pain control, diet as tolerates, IS, DVT ppx (SC hep).

## 2021-06-16 NOTE — PROGRESS NOTE ADULT - ASSESSMENT
This 40 yo M had open rad prostatectomy 6/4; large blood loss; in SICU intubated for 6 days; extubated, sent to floor, signed out AMA 6/12; prob ETOH; readmitted with fever  6/15 - no new events; cultures pending  6/16 - no events; awaiting cultures    Plan:  - cont meropenem  - check cultures  - OOB  - CIWA

## 2021-06-16 NOTE — PROGRESS NOTE ADULT - SUBJECTIVE AND OBJECTIVE BOX
[facility] Division of Hospital Medicine  Ludmila Evangelista MD  Pager (M-F, 8A-5P):  In-house pager [phone]; Long-range pager [phone]  Other Times:  Please page Hospitalist in Charge -  In-house pager [phone]    Patient is a 41y old  Male who presents with a chief complaint of fever (15 Chato 2021 16:08)    SUBJECTIVE / OVERNIGHT EVENTS: No problems reported over night.....  ADDITIONAL REVIEW OF SYSTEMS:    MEDICATIONS  (STANDING):  ciprofloxacin     Tablet 500 milliGRAM(s) Oral every 12 hours  folic acid 1 milliGRAM(s) Oral daily  heparin   Injectable 5000 Unit(s) SubCutaneous every 8 hours  lactated ringers. 1000 milliLiter(s) (125 mL/Hr) IV Continuous <Continuous>  levETIRAcetam 250 milliGRAM(s) Oral <User Schedule>  magnesium sulfate  IVPB 2 Gram(s) IV Intermittent once  metoprolol succinate ER 50 milliGRAM(s) Oral daily    MEDICATIONS  (PRN):  acetaminophen   Tablet .. 650 milliGRAM(s) Oral every 6 hours PRN Mild Pain (1 - 3)  ALBUTerol    90 MICROgram(s) HFA Inhaler 2 Puff(s) Inhalation every 6 hours PRN Shortness of Breath and/or Wheezing  LORazepam   Injectable 2 milliGRAM(s) IV Push every 2 hours PRN CIWA-Ar score increase by 2 points and a total score of 7 or less    I&O's Summary    15 Chato 2021 07:01  -  2021 07:00  --------------------------------------------------------  IN: 275 mL / OUT: 1240 mL / NET: -965 mL    PHYSICAL EXAM:  Vital Signs Last 24 Hrs  T(C): 37 (2021 09:01), Max: 37.6 (15 Chato 2021 17:15)  T(F): 98.6 (2021 09:01), Max: 99.6 (15 Chato 2021 17:15)  HR: 82 (2021 09:01) (77 - 93)  BP: 140/82 (2021 09:01) (135/67 - 147/90)  BP(mean): --  RR: 18 (2021 09:01) (18 - 19)  SpO2: 100% (2021 09:01) (95% - 100%)    GENERAL: thin BM, lying flat in bed, looks tired  hoarse voice  RESPIRATORY: Clear to auscultation bilaterally  CARDIOVASCULAR: Regular rate and rhythm; No murmurs, rubs, or gallops  GASTROINTESTINAL: soft, +BS, surgical site covered  GENITOURINARY: +lee  EXTREMITIES:  2+ Peripheral Pulses, No clubbing, cyanosis, or edema  NERVOUS SYSTEM:  nonfocal  SKIN: No rashes or lesions  PSYCH: calm    LABS:                        12.6   9.27  )-----------( 555      ( 15 Chato 2021 10:09 )             37.8     06-16    136  |  98  |  6<L>  ----------------------------<  95  4.5   |  21<L>  |  0.49<L>    Ca    8.9      2021 06:39  Phos  3.6     06-16  Mg     1.5     06-16    TPro  6.1  /  Alb  3.1<L>  /  TBili  0.5  /  DBili  <0.2  /  AST  13  /  ALT  5   /  AlkPhos  48  06-15    Urinalysis Basic - ( 2021 14:21 )  Color: Yellow / Appearance: Slightly Turbid / S.011 / pH: x  Gluc: x / Ketone: Trace  / Bili: Negative / Urobili: 3 mg/dL   Blood: x / Protein: 30 mg/dL / Nitrite: Negative   Leuk Esterase: Large / RBC: 15 /HPF / WBC >50 /HPF   Sq Epi: x / Non Sq Epi: 0 /HPF / Bacteria: Negative    Culture - Urine (collected 2021 15:32)  Source: .Urine Clean Catch (Midstream)  Final Report (15 Chato 2021 15:14):    <10,000 CFU/mL Normal Urogenital Selam    Culture - Blood (collected 2021 14:48)  Source: .Blood Blood-Peripheral  Preliminary Report (15 Chato 2021 15:02):    No growth to date.    Culture - Blood (collected 2021 14:48)  Source: .Blood Blood-Peripheral  Preliminary Report (15 Chato 2021 15:02):    No growth to date.    RADIOLOGY & ADDITIONAL TESTS:  Results Reviewed:   Imaging Personally Reviewed:  Electrocardiogram Personally Reviewed:    COORDINATION OF CARE:  Care Discussed with Consultants/Other Providers [Y/N]: urology re overall care   Prior or Outpatient Records Reviewed [Y/N]:   [facility] Division of Hospital Medicine  Ludmila Evangelista MD  Pager (M-F, 8A-5P):  In-house pager [phone]; Long-range pager [phone]  Other Times:  Please page Hospitalist in Charge -  In-house pager [phone]    Patient is a 41y old  Male who presents with a chief complaint of fever (15 Chato 2021 16:08)    SUBJECTIVE / OVERNIGHT EVENTS: No problems reported over night.  Up to BR, had soft BM.  Back hurts from the bed.  No nausea/vomiting, chest pain, SOB.  ADDITIONAL REVIEW OF SYSTEMS:    MEDICATIONS  (STANDING):  ciprofloxacin     Tablet 500 milliGRAM(s) Oral every 12 hours  folic acid 1 milliGRAM(s) Oral daily  heparin   Injectable 5000 Unit(s) SubCutaneous every 8 hours  lactated ringers. 1000 milliLiter(s) (125 mL/Hr) IV Continuous <Continuous>  levETIRAcetam 250 milliGRAM(s) Oral <User Schedule>  magnesium sulfate  IVPB 2 Gram(s) IV Intermittent once  metoprolol succinate ER 50 milliGRAM(s) Oral daily    MEDICATIONS  (PRN):  acetaminophen   Tablet .. 650 milliGRAM(s) Oral every 6 hours PRN Mild Pain (1 - 3)  ALBUTerol    90 MICROgram(s) HFA Inhaler 2 Puff(s) Inhalation every 6 hours PRN Shortness of Breath and/or Wheezing  LORazepam   Injectable 2 milliGRAM(s) IV Push every 2 hours PRN CIWA-Ar score increase by 2 points and a total score of 7 or less    I&O's Summary    15 Chato 2021 07:01  -  2021 07:00  --------------------------------------------------------  IN: 275 mL / OUT: 1240 mL / NET: -965 mL    PHYSICAL EXAM:  Vital Signs Last 24 Hrs  T(C): 37 (2021 09:01), Max: 37.6 (15 Chato 2021 17:15)  T(F): 98.6 (2021 09:01), Max: 99.6 (15 Chato 2021 17:15)  HR: 82 (2021 09:01) (77 - 93)  BP: 140/82 (2021 09:01) (135/67 - 147/90)  BP(mean): --  RR: 18 (2021 09:01) (18 - 19)  SpO2: 100% (2021 09:01) (95% - 100%)    GENERAL: thin BM, lying flat in bed, looks tired  hoarse voice  RESPIRATORY: Clear to auscultation bilaterally  CARDIOVASCULAR: Regular rate and rhythm; No murmurs, rubs, or gallops  GASTROINTESTINAL: soft, +BS, surgical site covered  GENITOURINARY: +lee  EXTREMITIES:  2+ Peripheral Pulses, No clubbing, cyanosis, or edema  NERVOUS SYSTEM:  nonfocal  SKIN: No rashes or lesions  PSYCH: calm    LABS:                        12.6   9.27  )-----------( 555      ( 15 Chato 2021 10:09 )             37.8     06-16    136  |  98  |  6<L>  ----------------------------<  95  4.5   |  21<L>  |  0.49<L>    Ca    8.9      2021 06:39  Phos  3.6     06-16  Mg     1.5     06-16    TPro  6.1  /  Alb  3.1<L>  /  TBili  0.5  /  DBili  <0.2  /  AST  13  /  ALT  5   /  AlkPhos  48  06-15    Urinalysis Basic - ( 2021 14:21 )  Color: Yellow / Appearance: Slightly Turbid / S.011 / pH: x  Gluc: x / Ketone: Trace  / Bili: Negative / Urobili: 3 mg/dL   Blood: x / Protein: 30 mg/dL / Nitrite: Negative   Leuk Esterase: Large / RBC: 15 /HPF / WBC >50 /HPF   Sq Epi: x / Non Sq Epi: 0 /HPF / Bacteria: Negative    Culture - Urine (collected 2021 15:32)  Source: .Urine Clean Catch (Midstream)  Final Report (15 Chato 2021 15:14):    <10,000 CFU/mL Normal Urogenital Selam    Culture - Blood (collected 2021 14:48)  Source: .Blood Blood-Peripheral  Preliminary Report (15 Chato 2021 15:02):    No growth to date.    Culture - Blood (collected 2021 14:48)  Source: .Blood Blood-Peripheral  Preliminary Report (15 Chato 2021 15:02):    No growth to date.    RADIOLOGY & ADDITIONAL TESTS:  Results Reviewed:   Imaging Personally Reviewed:  Electrocardiogram Personally Reviewed:    COORDINATION OF CARE:  Care Discussed with Consultants/Other Providers [Y/N]: urology re overall care   Prior or Outpatient Records Reviewed [Y/N]:

## 2021-06-16 NOTE — PROGRESS NOTE ADULT - ASSESSMENT
41M seizure disorder, alcohol abuse, recently diagnosed prostate cancer, admitted 6/4-6/12/21 s/p radical prostatectomy.  Operative course c/b 5 units blood loss, s/p 12 units pRBC, 7 units FFP, 3 units platelets, 14L crystalloids, and 1L albumin, admitted to the SICU intubated and for hemorrhage watch. Difficult to wean sedation due to agitation and concern for seizure like activity. Patient developed an ileus. Signed out AMA with lee.  Now returns with fever, chills, weakness.  Blood and urine cultures from 6/12 Serratia. 41M seizure disorder, alcohol abuse, recently diagnosed prostate cancer, admitted 6/4-6/12/21 s/p radical prostatectomy.  Operative course c/b 5 units blood loss, s/p 12 units pRBC, 7 units FFP, 3 units platelets, 14L crystalloids, and 1L albumin, admitted to the SICU intubated and for hemorrhage watch. Difficult to wean sedation due to agitation and concern for seizure like activity. Patient developed an ileus. Signed out AMA with lee.  Now returns with fever, chills, weakness.  Blood and urine cultures from 6/12 Serratia, repeat cultures 6/14 NGTD

## 2021-06-16 NOTE — PROGRESS NOTE ADULT - PROBLEM SELECTOR PLAN 3
CIWA monitoring (1 then 0 since)  MVI/thiamine/folic acid. CIWA monitoring (been 0)  MVI/thiamine/folic acid.

## 2021-06-16 NOTE — PROGRESS NOTE ADULT - SUBJECTIVE AND OBJECTIVE BOX
No events overnite; no CIWA triggers  Afeb 138/85 79 98%RA    Pt has no c/o  Abd- soft NT ND; + flatus      retention sutures in place  White 400

## 2021-06-17 ENCOUNTER — TRANSCRIPTION ENCOUNTER (OUTPATIENT)
Age: 41
End: 2021-06-17

## 2021-06-17 VITALS
HEART RATE: 92 BPM | SYSTOLIC BLOOD PRESSURE: 134 MMHG | OXYGEN SATURATION: 100 % | TEMPERATURE: 98 F | DIASTOLIC BLOOD PRESSURE: 82 MMHG | RESPIRATION RATE: 20 BRPM

## 2021-06-17 LAB — MAGNESIUM SERPL-MCNC: 1.2 MG/DL — LOW (ref 1.6–2.6)

## 2021-06-17 PROCEDURE — 99232 SBSQ HOSP IP/OBS MODERATE 35: CPT

## 2021-06-17 RX ORDER — ACETAMINOPHEN 500 MG
2 TABLET ORAL
Qty: 0 | Refills: 0 | DISCHARGE
Start: 2021-06-17

## 2021-06-17 RX ORDER — CIPROFLOXACIN LACTATE 400MG/40ML
1 VIAL (ML) INTRAVENOUS
Qty: 24 | Refills: 0
Start: 2021-06-17

## 2021-06-17 RX ORDER — LEVETIRACETAM 250 MG/1
1 TABLET, FILM COATED ORAL
Qty: 0 | Refills: 0 | DISCHARGE

## 2021-06-17 RX ORDER — MAGNESIUM SULFATE 500 MG/ML
2 VIAL (ML) INJECTION ONCE
Refills: 0 | Status: COMPLETED | OUTPATIENT
Start: 2021-06-17 | End: 2021-06-17

## 2021-06-17 RX ORDER — LEVETIRACETAM 250 MG/1
1 TABLET, FILM COATED ORAL
Qty: 14 | Refills: 0
Start: 2021-06-17 | End: 2021-06-23

## 2021-06-17 RX ADMIN — Medication 50 MILLIGRAM(S): at 05:48

## 2021-06-17 RX ADMIN — LEVETIRACETAM 250 MILLIGRAM(S): 250 TABLET, FILM COATED ORAL at 05:48

## 2021-06-17 RX ADMIN — Medication 1 MILLIGRAM(S): at 11:12

## 2021-06-17 RX ADMIN — Medication 500 MILLIGRAM(S): at 09:31

## 2021-06-17 RX ADMIN — HEPARIN SODIUM 5000 UNIT(S): 5000 INJECTION INTRAVENOUS; SUBCUTANEOUS at 05:39

## 2021-06-17 RX ADMIN — LEVETIRACETAM 250 MILLIGRAM(S): 250 TABLET, FILM COATED ORAL at 14:48

## 2021-06-17 RX ADMIN — Medication 50 GRAM(S): at 11:11

## 2021-06-17 NOTE — DISCHARGE NOTE NURSING/CASE MANAGEMENT/SOCIAL WORK - NSDCPNINST_GEN_ALL_CORE
Notify Dr Alvarado if you experience any increase in pain not relieved with medication, any bright red blood or clots in catheter or any fever >100.5 or shaking chills.  Drink plenty of fluids.  No heavy lifting or straining.  White care as instructed, use leg bag during the day and large drainage bag at night.  Use over the counter stool softeners to assist with constipation.   Notify Dr Alvarado if you experience any increase in pain not relieved with medication, any redness drainage or swelling around incisions,  any bright red blood or clots in catheter or any fever >100.5 or shaking chills.  Drink plenty of fluids.  No heavy lifting or straining.  White care as instructed, use leg bag during the day and large drainage bag at night.  Use over the counter stool softeners to assist with constipation.

## 2021-06-17 NOTE — DISCHARGE NOTE PROVIDER - NSDCCPCAREPLAN_GEN_ALL_CORE_FT
PRINCIPAL DISCHARGE DIAGNOSIS  Diagnosis: Bacteremia  Assessment and Plan of Treatment: Bacteremia

## 2021-06-17 NOTE — DISCHARGE NOTE PROVIDER - NSDCFUADDAPPT_GEN_ALL_CORE_FT
Please follow up with your primary care physician regarding your hospitalization within 2 weeks after your discharge.  Call Dr. Alvarado's office for follow up appt.  [phone]

## 2021-06-17 NOTE — CHART NOTE - NSCHARTNOTEFT_GEN_A_CORE
Called by primary team regarding keppra titration.    Plan:  Decrease keppra to 250 mg BID for 1 week and then discontinue.  Follow up with neurology at [address] [place] [address], [phone], Dr. Joseph    Case discussed with neurology attending, Dr. Parra, Neurology Chart Note:    Called by primary team regarding keppra titration.    Plan:    Decrease keppra to 250 mg BID for 1 week and then discontinue.  Follow up with neurology at [address] [place] [address], [phone], Dr. Joseph    Case discussed with neurology attending, Dr. Parra,

## 2021-06-17 NOTE — PROGRESS NOTE ADULT - SUBJECTIVE AND OBJECTIVE BOX
Overnight events:  None    Subjective:  Pt offers no complaints    Objective:    Vital signs  T(C): , Max: 36.9 (06-16-21 @ 22:11)  HR: 83 (06-17-21 @ 05:38)  BP: 146/78 (06-17-21 @ 05:38)  SpO2: 96% (06-17-21 @ 05:38)  Wt(kg): --    Output   Lee: 600  06-16 @ 07:01  -  06-17 @ 07:00  --------------------------------------------------------  IN: 0 mL / OUT: 1650 mL / NET: -1650 mL        Gen: NAD  Abd: retention sutures intact, dressing changed, staples intact, soft, nontender  : lee secured    Labs: none today                        12.6   9.27  )-----------( 555      ( 15 Chato 2021 10:09 )             37.8     16 Jun 2021 06:39    136    |  98     |  6      ----------------------------<  95     4.5     |  21     |  0.49     Ca    8.9        16 Jun 2021 06:39  Phos  3.6       16 Jun 2021 06:39  Mg     1.2       17 Jun 2021 07:44        Imaging:< from: CT Abdomen and Pelvis Cystogram w/ Catheter (06.16.21 @ 18:30) >  ******PRELIMINARY REPORT******            INTERPRETATION:  Extraperitoneal bladder rupture at the anastamosis of the prostatectomy bed.  Distended loops of fluid filled bowel and multiple anastamoses partially evaluated on this study, possibly postoperative ileus.

## 2021-06-17 NOTE — DISCHARGE NOTE PROVIDER - NSDCMRMEDTOKEN_GEN_ALL_CORE_FT
amoxicillin-clavulanate 875 mg-125 mg oral tablet: 1 tab(s) orally 2 times a day       **NOT PICKED UP FROM PHARMACY  folic acid 1 mg oral tablet: 1 tab(s) orally once a day  Keppra 250 mg oral tablet: 1 tab(s) orally 3 times a day  metoprolol succinate 50 mg oral tablet, extended release: 1 tab(s) orally once a day  Ventolin HFA CFC free 90 mcg/inh inhalation aerosol: 2 puff(s) inhaled every 6 hours, As Needed   acetaminophen 325 mg oral tablet: 2 tab(s) orally every 6 hours, As needed, Mild Pain (1 - 3)  ciprofloxacin 500 mg oral tablet: 1 tab(s) orally every 12 hours for the next 12 days  folic acid 1 mg oral tablet: 1 tab(s) orally once a day  Keppra 250 mg oral tablet: 1 tab(s) orally 2 times a day for 1 week and then discontinue  metoprolol succinate 50 mg oral tablet, extended release: 1 tab(s) orally once a day  Ventolin HFA CFC free 90 mcg/inh inhalation aerosol: 2 puff(s) inhaled every 6 hours, As Needed

## 2021-06-17 NOTE — DISCHARGE NOTE PROVIDER - NSDCFUADDINST_GEN_ALL_CORE_FT
Dressing Instructions:  Remove dressing and wash with soap and water; then pat dry   Apply one layer of xeroform and sterile gauze and tegaderm daily - continue through follow up with Dr. Alvarado   -Change to small lee bag during day and large at night as instructed

## 2021-06-17 NOTE — PROGRESS NOTE ADULT - ASSESSMENT
41M seizure disorder, alcohol abuse, recently diagnosed prostate cancer, admitted 6/4-6/12/21 s/p radical prostatectomy.  Operative course c/b 5 units blood loss, s/p 12 units pRBC, 7 units FFP, 3 units platelets, 14L crystalloids, and 1L albumin, admitted to the SICU intubated and for hemorrhage watch. Difficult to wean sedation due to agitation and concern for seizure like activity. Patient developed an ileus. Signed out AMA with lee.  Now returns with fever, chills, weakness.  Blood and urine cultures from 6/12 Serratia, repeat cultures 6/14 NGTD

## 2021-06-17 NOTE — PROGRESS NOTE ADULT - ASSESSMENT
This 42 yo M had open rad prostatectomy 6/4; large blood loss; in SICU intubated for 6 days; extubated, sent to floor, signed out AMA 6/12; prob ETOH; readmitted with fever, started on zosyn  6/15 - no new events; cultures pending  6/16 - no events; awaiting cultures  6/17 - pt remained afebrile, tolerating diet, abx changed to po cipro, blood and urine cultures serratia, repeat Cx neg, Bcx NGTD, CT w/ EP leak at anastamosis, no CIWA triggers    Plan:  - cont cipro  - f/u repeat Bcx  - OOB  - d/c CIWA  - lee /leg bag teaching  - f/u medicine  - d/c home later today with lee on cipro

## 2021-06-17 NOTE — DISCHARGE NOTE PROVIDER - CARE PROVIDER_API CALL
Ravin Alvarado)  Urology  [address] [place] [address], [place] [address]  Phone: [phone]  Fax: [phone]  Established Patient  Follow Up Time:

## 2021-06-17 NOTE — PROGRESS NOTE ADULT - PROBLEM SELECTOR PLAN 5
post op management per urology, encourage ambulation, pain control, diet as tolerates, IS, DVT ppx (SC hep).

## 2021-06-17 NOTE — PROGRESS NOTE ADULT - SUBJECTIVE AND OBJECTIVE BOX
[facility] Division of Hospital Medicine  Ludmila Evangelista MD  Pager (M-F, 8A-5P):  In-house pager [phone]; Long-range pager [phone]  Other Times:  Please page Hospitalist in Charge -  In-house pager [phone]    Patient is a 41y old  Male who presents with a chief complaint of Fever s/p Open Rad Prostatectomy (17 Jun 2021 13:04)    SUBJECTIVE / OVERNIGHT EVENTS:   Pt seen earlier this morning about 0930.  Sitting up on side of bed, doing well.  Tolerating PO, pain controlled.  ADDITIONAL REVIEW OF SYSTEMS:    MEDICATIONS  (STANDING):  ciprofloxacin     Tablet 500 milliGRAM(s) Oral every 12 hours  folic acid 1 milliGRAM(s) Oral daily  heparin   Injectable 5000 Unit(s) SubCutaneous every 8 hours  levETIRAcetam 250 milliGRAM(s) Oral <User Schedule>  metoprolol succinate ER 50 milliGRAM(s) Oral daily    MEDICATIONS  (PRN):  acetaminophen   Tablet .. 650 milliGRAM(s) Oral every 6 hours PRN Mild Pain (1 - 3)  ALBUTerol    90 MICROgram(s) HFA Inhaler 2 Puff(s) Inhalation every 6 hours PRN Shortness of Breath and/or Wheezing  oxyCODONE    IR 5 milliGRAM(s) Oral every 4 hours PRN Moderate Pain (4 - 6)  oxyCODONE    IR 10 milliGRAM(s) Oral every 4 hours PRN Severe Pain (7 - 10)    I&O's Summary    16 Jun 2021 07:01  -  17 Jun 2021 07:00  --------------------------------------------------------  IN: 0 mL / OUT: 1650 mL / NET: -1650 mL    17 Jun 2021 07:01  -  17 Jun 2021 19:06  --------------------------------------------------------  IN: 0 mL / OUT: 375 mL / NET: -375 mL    PHYSICAL EXAM:  Vital Signs Last 24 Hrs  T(C): 36.8 (17 Jun 2021 09:53), Max: 36.9 (16 Jun 2021 22:11)  T(F): 98.2 (17 Jun 2021 09:53), Max: 98.5 (16 Jun 2021 22:11)  HR: 92 (17 Jun 2021 09:53) (79 - 92)  BP: 134/82 (17 Jun 2021 09:53) (134/82 - 154/84)  BP(mean): --  RR: 20 (17 Jun 2021 09:53) (19 - 20)  SpO2: 100% (17 Jun 2021 09:53) (96% - 100%)    GENERAL: thin BM, sitting up in bed, smiling, looks comfortable  hoarse voice  RESPIRATORY: Clear to auscultation bilaterally  CARDIOVASCULAR: Regular rate and rhythm; No murmurs, rubs, or gallops  GASTROINTESTINAL: soft, +BS, surgical site covered  GENITOURINARY: +lee  EXTREMITIES:  2+ Peripheral Pulses, No clubbing, cyanosis, or edema  NERVOUS SYSTEM:  nonfocal  SKIN: No rashes or lesions  PSYCH: calm    LABS:    06-16    136  |  98  |  6<L>  ----------------------------<  95  4.5   |  21<L>  |  0.49<L>    Ca    8.9      16 Jun 2021 06:39  Phos  3.6     06-16  Mg     1.2     06-17                  RADIOLOGY & ADDITIONAL TESTS:  Results Reviewed:   Imaging Personally Reviewed:  Electrocardiogram Personally Reviewed:    COORDINATION OF CARE:  Care Discussed with Consultants/Other Providers [Y/N]: urology re overall care  Prior or Outpatient Records Reviewed [Y/N]:

## 2021-06-17 NOTE — DISCHARGE NOTE NURSING/CASE MANAGEMENT/SOCIAL WORK - PATIENT PORTAL LINK FT
You can access the FollowMyHealth Patient Portal offered by [facility] by registering at the following website: [email or web address]. By joining [employer]’s FollowMyHealth portal, you will also be able to view your health information using other applications (apps) compatible with our system.

## 2021-06-17 NOTE — DISCHARGE NOTE PROVIDER - HOSPITAL COURSE
41M seizure disorder, alcohol abuse, recently diagnosed prostate cancer, admitted 6/4-6/12/21 s/p radical prostatectomy.  Operative course c/b 5 units blood loss, s/p 12 units pRBC, 7 units FFP, 3 units platelets, 14L crystalloids, and 1L albumin, admitted to the SICU intubated and for hemorrhage watch. Difficult to wean sedation due to agitation and concern for seizure like activity. Patient developed an ileus. After being transferred to the floor patient pulled out all tubes; lee, NGT, IVs and signed out AMA. Lee was replaced prior to him leaving and he was given Augmentin given fevers and pending cultures.     Pt admitted on 6/14 after having fevers and chills at home. He says he feels overall weakness. He has minimal pain. He has had some intermittent blood in his urine. He reports that he has been tolerating very little PO at home. He also reports positive flatus and had a bowel movement yesterday.    Reports drank "one drink" since left hospital, denies cravings, shakes, hallucinations.     Urine and blood cx from 6/12 GNR Serratia.    By 6/17 - pt remained afebrile, tolerating diet, abx changed to po cipro, blood and urine cultures serratia, repeat Cx neg, Bcx NGTD,  Abd CT w/ EP leak at anastomosis,  No CIWA triggers.  Pt stable for D/C home with lee and wound care

## 2021-06-17 NOTE — DISCHARGE NOTE NURSING/CASE MANAGEMENT/SOCIAL WORK - NSDCPECAREGIVERED_GEN_ALL_CORE
carenotes on sepsis, lee care, pain handout, urology side effects pamphlet carenotes on lee care, sepsis, urology side effects pamphlet

## 2021-06-17 NOTE — PROGRESS NOTE ADULT - PROBLEM SELECTOR PLAN 4
c/w metoprolol as bp/hr tolerate.

## 2021-06-19 LAB
CULTURE RESULTS: SIGNIFICANT CHANGE UP
CULTURE RESULTS: SIGNIFICANT CHANGE UP
SPECIMEN SOURCE: SIGNIFICANT CHANGE UP
SPECIMEN SOURCE: SIGNIFICANT CHANGE UP

## 2021-06-23 DIAGNOSIS — Z71.89 OTHER SPECIFIED COUNSELING: ICD-10-CM

## 2021-06-24 ENCOUNTER — APPOINTMENT (OUTPATIENT)
Dept: UROLOGY | Facility: CLINIC | Age: 41
End: 2021-06-24
Payer: MEDICAID

## 2021-06-24 PROCEDURE — 99024 POSTOP FOLLOW-UP VISIT: CPT

## 2021-06-24 NOTE — REVIEW OF SYSTEMS
[Feeling Tired] : feeling tired [Sore Throat] : sore throat [Abdominal Pain] : abdominal pain [Constipation] : constipation [Diarrhea] : diarrhea [see HPI] : see HPI [Leakage of urine with straining, coughing, laughing] : leakage of urine with straining, coughing, laughing [Limb Swelling] : limb swelling [Dizziness] : dizziness [Muscle Weakness] : muscle weakness [Negative] : Heme/Lymph

## 2021-06-27 NOTE — PHYSICAL EXAM
[General Appearance - Well Developed] : well developed [General Appearance - Well Nourished] : well nourished [Normal Appearance] : normal appearance [Well Groomed] : well groomed [General Appearance - In No Acute Distress] : no acute distress [Edema] : no peripheral edema [] : no respiratory distress [Respiration, Rhythm And Depth] : normal respiratory rhythm and effort [Exaggerated Use Of Accessory Muscles For Inspiration] : no accessory muscle use [Abdomen Soft] : soft [Abdomen Tenderness] : non-tender [Costovertebral Angle Tenderness] : no ~M costovertebral angle tenderness [FreeTextEntry1] : White with clear urine output.

## 2021-06-27 NOTE — HISTORY OF PRESENT ILLNESS
[FreeTextEntry1] : Patient returns the office for postoperative visit.\par s/p open radical retropubic prostatectomy 6/4/2021.\par Path: Ara 7 (3+4), T2Nx, negative margins.\par \par Hospital course complicated by prolonged intubation, ICU stay.  Patient self DC'd his White catheter and left the hospital AMA.  He then returned 24 hours later for admission.  He was discharged home several days later.\par \par Since going home he has been able to tolerate a regular diet.  He is having normal bowel movements.  His White catheter continues to put a clear yellow urine.\par \par His incision is healing well.  He continues have dressing changes daily.

## 2021-06-27 NOTE — ASSESSMENT
[FreeTextEntry1] : Will return next week for CT pelvis cystogram.\par If urethral anastomosis healed well, then catheter will be removed.\par Pathology results reviewed with patient and his mom.  No adjuvant therapy expected.\par

## 2021-06-29 ENCOUNTER — APPOINTMENT (OUTPATIENT)
Dept: CT IMAGING | Facility: IMAGING CENTER | Age: 41
End: 2021-06-29
Payer: MEDICAID

## 2021-06-29 ENCOUNTER — APPOINTMENT (OUTPATIENT)
Dept: UROLOGY | Facility: CLINIC | Age: 41
End: 2021-06-29
Payer: MEDICAID

## 2021-06-29 ENCOUNTER — OUTPATIENT (OUTPATIENT)
Dept: OUTPATIENT SERVICES | Facility: HOSPITAL | Age: 41
LOS: 1 days | End: 2021-06-29
Payer: MEDICAID

## 2021-06-29 VITALS
SYSTOLIC BLOOD PRESSURE: 121 MMHG | TEMPERATURE: 98.2 F | RESPIRATION RATE: 18 BRPM | DIASTOLIC BLOOD PRESSURE: 86 MMHG | HEART RATE: 98 BPM

## 2021-06-29 DIAGNOSIS — Z98.890 OTHER SPECIFIED POSTPROCEDURAL STATES: Chronic | ICD-10-CM

## 2021-06-29 DIAGNOSIS — N99.89 OTHER POSTPROCEDURAL COMPLICATIONS AND DISORDERS OF GENITOURINARY SYSTEM: ICD-10-CM

## 2021-06-29 DIAGNOSIS — C61 MALIGNANT NEOPLASM OF PROSTATE: ICD-10-CM

## 2021-06-29 DIAGNOSIS — Z87.828 PERSONAL HISTORY OF OTHER (HEALED) PHYSICAL INJURY AND TRAUMA: Chronic | ICD-10-CM

## 2021-06-29 PROCEDURE — 99024 POSTOP FOLLOW-UP VISIT: CPT

## 2021-06-29 PROCEDURE — 72192 CT PELVIS W/O DYE: CPT | Mod: 26

## 2021-06-29 PROCEDURE — 72192 CT PELVIS W/O DYE: CPT

## 2021-07-06 ENCOUNTER — APPOINTMENT (OUTPATIENT)
Dept: UROLOGY | Facility: CLINIC | Age: 41
End: 2021-07-06
Payer: MEDICAID

## 2021-07-06 VITALS — SYSTOLIC BLOOD PRESSURE: 138 MMHG | HEART RATE: 92 BPM | DIASTOLIC BLOOD PRESSURE: 96 MMHG | RESPIRATION RATE: 18 BRPM

## 2021-07-06 PROCEDURE — 99024 POSTOP FOLLOW-UP VISIT: CPT

## 2021-07-06 NOTE — ASSESSMENT
[FreeTextEntry1] : Bladder filled, catheter removed today.\par Voided easily, PVR 0\par Discussed Kegel exercises.\par PSA today.\par Follow up in 6 weeks.

## 2021-07-06 NOTE — HISTORY OF PRESENT ILLNESS
[FreeTextEntry1] : Patient returns the office for postoperative visit.\par s/p open radical retropubic prostatectomy 6/4/2021.\par Path: Ara 7 (3+4), T2Nx, negative margins.\par Hospital course complicated by prolonged intubation, ICU stay.  Patient self DC'd his White catheter and left the hospital AMA.  He then returned 24 hours later for admission.  He was discharged home several days later.\par \par Here for catheter removal.  Prior CT cystogram showed very small extravasation at the vesicourethral anastomosis.  Significantly improved from previous study.\par \par Since his last visit, urine has been clear.  His midline incision is healing well.  His midline retention sutures have been removed.

## 2021-07-07 LAB — PSA SERPL-MCNC: 0.05 NG/ML

## 2021-07-07 NOTE — ASSESSMENT
[FreeTextEntry1] : Reviewed the results of the CT cystogram.  We will keep the catheter in place for another 10 days.\par Retention sutures removed today without difficulty.  Abdominal incision is healing well.\par \par Follow-up in the office in 10 days.

## 2021-07-07 NOTE — HISTORY OF PRESENT ILLNESS
[FreeTextEntry1] : Patient returns the office for postoperative visit.\par s/p open radical retropubic prostatectomy 6/4/2021.\par Path: Ara 7 (3+4), T2Nx, negative margins.\par Hospital course complicated by prolonged intubation, ICU stay.  Patient self DC'd his White catheter and left the hospital AMA.  He then returned 24 hours later for admission.  He was discharged home several days later.\par \par Here for catheter removal.  CT cystogram showed very small extravasation at the vesicourethral anastomosis.  Significantly improved from previous study.

## 2021-08-19 ENCOUNTER — APPOINTMENT (OUTPATIENT)
Dept: UROLOGY | Facility: CLINIC | Age: 41
End: 2021-08-19
Payer: MEDICAID

## 2021-08-19 DIAGNOSIS — N99.89 OTHER POSTPROCEDURAL COMPLICATIONS AND DISORDERS OF GENITOURINARY SYSTEM: ICD-10-CM

## 2021-08-19 PROCEDURE — 99024 POSTOP FOLLOW-UP VISIT: CPT

## 2021-08-20 LAB — PSA SERPL-MCNC: <0.01 NG/ML

## 2021-08-22 PROBLEM — N99.89: Status: RESOLVED | Noted: 2021-06-22 | Resolved: 2021-08-22

## 2021-08-22 NOTE — PHYSICAL EXAM
[General Appearance - Well Developed] : well developed [General Appearance - Well Nourished] : well nourished [Normal Appearance] : normal appearance [Bowel Sounds] : normal bowel sounds [Abdomen Tenderness] : non-tender [Urethral Meatus] : meatus normal [Urinary Bladder Findings] : the bladder was normal on palpation [Scrotum] : the scrotum was normal [Testes Mass (___cm)] : there were no testicular masses [Skin Color & Pigmentation] : normal skin color and pigmentation [Heart Rate And Rhythm] : Heart rate and rhythm were normal [Edema] : no peripheral edema [] : no respiratory distress [Exaggerated Use Of Accessory Muscles For Inspiration] : no accessory muscle use [Oriented To Time, Place, And Person] : oriented to person, place, and time [Affect] : the affect was normal [Normal Station and Gait] : the gait and station were normal for the patient's age [FreeTextEntry1] : mid abdominal scar

## 2021-08-22 NOTE — DISEASE MANAGEMENT
[1] : T1 [c] : c [0] : N0 [10-20] : 10 - 20 ng/mL [6] : Marinette Score 6 [] : Patient had a Prostate MRI [BiopsyDate] : 9/18/2020 [TotalCores] : 12 [TotalPositiveCores] : 2 [MaxCoreInvolvement] : 10% [FreeTextEntry7] : PSA at diagnosis 11.2 ng/mL.\par MRI done at [facility]: reported as PIRAD 4 but no lesion detected. [I] : I [Patient had a radical prostatectomy] : Patient had a radical prostatectomy  [Radical Prostatectomy] : Radical Prostatectomy [7(3+4)] : Ara Score 7(3+4) [Negative] : Negative margins [2] : T2 [X] : NX [RadicalProstatectomyDate] : 6/4/2021 [TotalNumberofnodesresected] : 0 [PositiveNodes] : 0

## 2021-08-22 NOTE — DISEASE MANAGEMENT
[1] : T1 [c] : c [0] : N0 [10-20] : 10 - 20 ng/mL [6] : Ashley Score 6 [] : Patient had a Prostate MRI [BiopsyDate] : 9/18/2020 [TotalCores] : 12 [TotalPositiveCores] : 2 [MaxCoreInvolvement] : 10% [I] : I [FreeTextEntry7] : PSA at diagnosis 11.2 ng/mL.\par MRI done at [facility]: reported as PIRAD 4 but no lesion detected. [Radical Prostatectomy] : Radical Prostatectomy [Patient had a radical prostatectomy] : Patient had a radical prostatectomy  [7(3+4)] : Ara Score 7(3+4) [Negative] : Negative margins [2] : T2 [X] : NX [RadicalProstatectomyDate] : 6/4/2021 [TotalNumberofnodesresected] : 0 [PositiveNodes] : 0

## 2021-08-22 NOTE — HISTORY OF PRESENT ILLNESS
[FreeTextEntry1] : Here for postop evaluation.\par His incision is well-healed.  He continues to have stress incontinence although symptoms are improving.  He has been practicing Kegel exercises at home.  He has a good urinary flow and complete bladder emptying.  He wears about 2-3 pads per day.  Denies gross hematuria, dysuria.  No abdominal pain or flank pain.\par Has not had any return of erectile function.

## 2021-11-16 ENCOUNTER — APPOINTMENT (OUTPATIENT)
Dept: UROLOGY | Facility: CLINIC | Age: 41
End: 2021-11-16
Payer: MEDICAID

## 2021-11-16 VITALS
BODY MASS INDEX: 20.44 KG/M2 | HEART RATE: 102 BPM | DIASTOLIC BLOOD PRESSURE: 75 MMHG | WEIGHT: 146 LBS | TEMPERATURE: 98.2 F | HEIGHT: 71 IN | RESPIRATION RATE: 17 BRPM | SYSTOLIC BLOOD PRESSURE: 109 MMHG

## 2021-11-16 DIAGNOSIS — N39.41 URGE INCONTINENCE: ICD-10-CM

## 2021-11-16 PROCEDURE — 99213 OFFICE O/P EST LOW 20 MIN: CPT

## 2021-11-16 RX ORDER — OXYBUTYNIN CHLORIDE 10 MG/1
10 TABLET, EXTENDED RELEASE ORAL
Qty: 90 | Refills: 3 | Status: COMPLETED | COMMUNITY
Start: 2021-08-19 | End: 2021-11-16

## 2021-11-18 LAB — PSA SERPL-MCNC: <0.01 NG/ML

## 2021-11-25 NOTE — HISTORY OF PRESENT ILLNESS
[FreeTextEntry1] : Here for 3 month follow up.\par Abdominal incision well healed.\par Continues to have stress incontinence but improving.\par Partial erectile function recovery, unsuitable for intercourse.\par No other new complaints.\par Patient continues to drink significant EtOH.\par \par No other changes in medical hx.

## 2021-11-25 NOTE — DISEASE MANAGEMENT
[1] : T1 [c] : c [0] : N0 [10-20] : 10 - 20 ng/mL [Biopsy] : Patient had a biopsy on [6] : Template Biopsy Ara Score: 6 [] : Patient had a Prostate MRI [Neg] : Negative [BiopsyDate] : 9/18/2020 [TotalCores] : 12 [TotalPositiveCores] : 2 [MaxCoreInvolvement] : 10% [FreeTextEntry7] : PSA at diagnosis 11.2 ng/mL.\par MRI done at [facility]: reported as PIRAD 4 but no lesion detected. [I] : I [Radical Prostatectomy] : Radical Prostatectomy [Patient had a radical prostatectomy] : Patient had a radical prostatectomy  [7(3+4)] : Ara Score 7(3+4) [Negative] : Negative margins [2] : T2 [X] : NX [RadicalProstatectomyDate] : 6/4/2021 [TotalNumberofnodesresected] : 0 [PositiveNodes] : 0

## 2021-12-01 PROCEDURE — G9005: CPT

## 2022-03-24 NOTE — PROGRESS NOTE ADULT - SUBJECTIVE AND OBJECTIVE BOX
SURGICAL INTENSIVE CARE UNIT AFTERNOON PROGRESS NOTE    Overnight events:    - Placed back on propofol as pt. becoming increasingly agitated and is a high-risk for self-extubation  - Attempted weaning, but unsuccessful    HPI: 42 yo male presents to [facility] for preop evaluation for open radical retropubic prostatectomy, pelvic lymph node dissection.  Patient  with elevated PSA levels s/p prostate biopsy and diagnosed with malignant neoplasm of prostate.  Patient reports increased urinary frequency, denies dysuria or hematuria.     NEURO: waking up following simple commands    RESPIRATORY  RR: 14 (06-07-21 @ 15:00) (14 - 23)  SpO2: 98% (06-07-21 @ 15:00) (93% - 100%)  Mechanical Ventilation: Mode: AC/ CMV (Assist Control/ Continuous Mandatory Ventilation), RR (machine): 14, RR (patient): 14, TV (machine): 440, FiO2: 30, PEEP: 5, ITime: 0.88, MAP: 8, PIP: 18  ABG - ( 07 Jun 2021 01:17 )  pH: 7.37  /  pCO2: 44    /  pO2: 97    / HCO3: 24    / Base Excess: x     /  SaO2: 97.4    Lactate: x          CARDIOVASCULAR  HR: 76 (06-07-21 @ 15:00) (68 - 159)  BP: 155/101 (06-07-21 @ 08:00) (107/74 - 155/101)  BP(mean): 115 (06-07-21 @ 08:00) (85 - 115)  ABP: 143/87 (06-07-21 @ 15:00) (88/58 - 192/112)  ABP(mean): 108 (06-07-21 @ 15:00) (69 - 142)    GI/NUTRITION  Diet: NPO   MANUEL placed     GENITOURINARY  I&O's Detail    06-06 @ 07:01  -  06-07 @ 07:00  --------------------------------------------------------  IN:    Dexmedetomidine: 94.6 mL    dextrose 5% + sodium chloride 0.45% w/ Additives: 1800 mL    FentaNYL: 278.4 mL    IV PiggyBack: 350 mL    Lactated Ringers Bolus: 1000 mL    Midazolam: 8.7 mL    Propofol: 187.5 mL  Total IN: 3719.2 mL    OUT:    Bulb (mL): 25 mL    Indwelling Catheter - Urethral (mL): 4775 mL  Total OUT: 4800 mL    Total NET: -1080.8 mL      06-07 @ 07:01  -  06-07 @ 15:06  --------------------------------------------------------  IN:    Dexmedetomidine: 101.5 mL    dextrose 5% + sodium chloride 0.45% w/ Additives: 525 mL    FentaNYL: 81.2 mL    Propofol: 74.6 mL  Total IN: 782.3 mL    OUT:    Indwelling Catheter - Urethral (mL): 2170 mL  Total OUT: 2170 mL    Total NET: -1387.7 mL      06-07    139  |  107  |  4<L>  ----------------------------<  102<H>  4.0   |  23  |  0.51    Ca    7.9<L>      07 Jun 2021 01:17  Phos  2.8     06-07  Mg     1.5     06-07      HEMATOLOGIC                        11.3   8.20  )-----------( 118      ( 07 Jun 2021 01:17 )             35.5    No restrictions

## 2022-05-12 ENCOUNTER — APPOINTMENT (OUTPATIENT)
Dept: PULMONOLOGY | Facility: CLINIC | Age: 42
End: 2022-05-12
Payer: MEDICAID

## 2022-05-12 VITALS
OXYGEN SATURATION: 97 % | RESPIRATION RATE: 16 BRPM | TEMPERATURE: 97.7 F | DIASTOLIC BLOOD PRESSURE: 86 MMHG | WEIGHT: 125 LBS | HEART RATE: 97 BPM | BODY MASS INDEX: 17.5 KG/M2 | SYSTOLIC BLOOD PRESSURE: 121 MMHG | HEIGHT: 71 IN

## 2022-05-12 DIAGNOSIS — R91.1 SOLITARY PULMONARY NODULE: ICD-10-CM

## 2022-05-12 PROCEDURE — 99204 OFFICE O/P NEW MOD 45 MIN: CPT

## 2022-05-12 NOTE — ASSESSMENT
[FreeTextEntry1] : 1. centrilobular emphysema with minimal clinical symtoms\par - plan for PFT\par - extensive smoking cessation counseling provided\par pt not interested at this time\par \par 2. Left lingular ill defined area of ggo\par recommend 6 mo follow up CT chest given pt is an active smoker\par refer for CT chest at [facility] at this time

## 2022-05-12 NOTE — HISTORY OF PRESENT ILLNESS
[Current] : current [TextBox_4] : Patient is 43 yo male is an active smoker with PMHx Asthma on Ventolin AR and Prostate Cancer s/p proctectomy 06/2021. Patient is referred to clinic for abnormal CT chest results from his oncologist, Dr. Matias. He smokes a pack every 3 days. He states minimal use of inhaler. Also endorses seasonal allergies, only aggregated with pollen and outdoor allergens, not currently taking any medications. Patient is vaccinated against COVID x 2, not vaccinated against the flu. \par  Patient denies any recent sick contacts or illnesses.  [TextBox_11] : 1 [TextBox_13] : 30

## 2022-05-12 NOTE — REVIEW OF SYSTEMS
[Chest Tightness] : chest tightness [Seasonal Allergies] : seasonal allergies [Negative] : Endocrine [Cough] : no cough [SOB on Exertion] : no sob on exertion [TextBox_30] : Rarely in the AM upon awakening

## 2022-05-12 NOTE — PHYSICAL EXAM
[No Acute Distress] : no acute distress [Well Nourished] : well nourished [Well Groomed] : well groomed [Normal Oropharynx] : normal oropharynx [Normal Appearance] : normal appearance [No Neck Mass] : no neck mass [Normal Rate/Rhythm] : normal rate/rhythm [Normal S1, S2] : normal s1, s2 [No Murmurs] : no murmurs [No Resp Distress] : no resp distress [Clear to Auscultation Bilaterally] : clear to auscultation bilaterally [No Abnormalities] : no abnormalities [Benign] : benign [Normal Gait] : normal gait [No Clubbing] : no clubbing [No Cyanosis] : no cyanosis [No Edema] : no edema [FROM] : FROM [Normal Color/ Pigmentation] : normal color/ pigmentation [No Focal Deficits] : no focal deficits [Oriented x3] : oriented x3 [Normal Affect] : normal affect

## 2022-05-12 NOTE — CONSULT LETTER
[Dear  ___] : Dear  [unfilled], [Consult Letter:] : I had the pleasure of evaluating your patient, [unfilled]. [Please see my note below.] : Please see my note below. [Consult Closing:] : Thank you very much for allowing me to participate in the care of this patient.  If you have any questions, please do not hesitate to contact me. [Sincerely,] : Sincerely, [FreeTextEntry3] : Jie Pierre MD, [facility]

## 2022-05-17 ENCOUNTER — APPOINTMENT (OUTPATIENT)
Dept: UROLOGY | Facility: CLINIC | Age: 42
End: 2022-05-17
Payer: MEDICAID

## 2022-05-17 VITALS
HEART RATE: 116 BPM | BODY MASS INDEX: 17.5 KG/M2 | HEIGHT: 71 IN | SYSTOLIC BLOOD PRESSURE: 130 MMHG | RESPIRATION RATE: 17 BRPM | DIASTOLIC BLOOD PRESSURE: 86 MMHG | WEIGHT: 125 LBS | TEMPERATURE: 97.2 F

## 2022-05-17 DIAGNOSIS — N52.31 ERECTILE DYSFUNCTION FOLLOWING RADICAL PROSTATECTOMY: ICD-10-CM

## 2022-05-17 PROCEDURE — 99214 OFFICE O/P EST MOD 30 MIN: CPT

## 2022-05-17 RX ORDER — SILDENAFIL 50 MG/1
50 TABLET ORAL
Qty: 10 | Refills: 11 | Status: ACTIVE | COMMUNITY
Start: 2022-05-17 | End: 1900-01-01

## 2022-05-17 NOTE — HISTORY OF PRESENT ILLNESS
[FreeTextEntry1] : Here for 6-month follow-up.  Urinary function remains stable.  Has occasional stress incontinence but does not usually wear pads.  Reports that urine flow is okay.  Nocturia 1-2 times per night.  No gross hematuria, dysuria or other signs or symptoms of urinary tract infection.  Denies abdominal pain or flank pain.  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  Occasionally notices mild fullness of the penis.  Currently not on medication.

## 2022-05-17 NOTE — DISEASE MANAGEMENT
[1] : T1 [c] : c [0] : N0 [10-20] : 10 - 20 ng/mL [Biopsy] : Patient had a biopsy on [6] : Template Biopsy Ara Score: 6 [] : Patient had a Prostate MRI [Neg] : Negative [I] : I [Radical Prostatectomy] : Radical Prostatectomy [Patient had a radical prostatectomy] : Patient had a radical prostatectomy  [7(3+4)] : Ara Score 7(3+4) [Negative] : Negative margins [2] : T2 [X] : NX [BiopsyDate] : 9/18/2020 [TotalCores] : 12 [TotalPositiveCores] : 2 [MaxCoreInvolvement] : 10% [FreeTextEntry7] : PSA at diagnosis 11.2 ng/mL.\par MRI done at [facility]: reported as PIRAD 4 but no lesion detected. [RadicalProstatectomyDate] : 6/4/2021 [TotalNumberofnodesresected] : 0 [PositiveNodes] : 0

## 2022-05-17 NOTE — PHYSICAL EXAM
[FreeTextEntry1] : Gen:  No apparent distress\par Abdomen: soft, non tender, non distended.  \par Incision: clean and intact.  No surrounding erythema.\par :  normal bladder.\par Ext: no edema.  \par Neuro: Normal gait, normal LE strength.\par \par

## 2022-05-17 NOTE — DISEASE MANAGEMENT
[1] : T1 [c] : c [0] : N0 [10-20] : 10 - 20 ng/mL [Biopsy] : Patient had a biopsy on [6] : Template Biopsy Ara Score: 6 [] : Patient had a Prostate MRI [Neg] : Negative [I] : I [Radical Prostatectomy] : Radical Prostatectomy [Patient had a radical prostatectomy] : Patient had a radical prostatectomy  [7(3+4)] : Ara Score 7(3+4) [Negative] : Negative margins [2] : T2 [X] : NX [BiopsyDate] : 9/18/2020 [TotalCores] : 12 [TotalPositiveCores] : 2 [MaxCoreInvolvement] : 10% [FreeTextEntry7] : PSA at diagnosis 11.2 ng/mL.\par MRI done at [facility]: reported as PIRAD 4 but no lesion detected. [RadicalProstatectomyDate] : 6/4/2021 [TotalNumberofnodesresected] : 0 [PositiveNodes] : 0

## 2022-05-19 LAB — PSA SERPL-MCNC: 0.03 NG/ML

## 2022-07-26 ENCOUNTER — APPOINTMENT (OUTPATIENT)
Dept: PULMONOLOGY | Facility: CLINIC | Age: 42
End: 2022-07-26

## 2022-07-26 VITALS — HEIGHT: 69 IN | HEART RATE: 112 BPM | BODY MASS INDEX: 17.63 KG/M2 | WEIGHT: 119 LBS | OXYGEN SATURATION: 100 %

## 2022-07-26 VITALS — TEMPERATURE: 94.7 F

## 2022-07-26 PROCEDURE — 94010 BREATHING CAPACITY TEST: CPT

## 2022-07-26 PROCEDURE — 94729 DIFFUSING CAPACITY: CPT

## 2022-07-26 PROCEDURE — 94726 PLETHYSMOGRAPHY LUNG VOLUMES: CPT

## 2022-09-06 ENCOUNTER — NON-APPOINTMENT (OUTPATIENT)
Age: 42
End: 2022-09-06

## 2023-02-13 ENCOUNTER — APPOINTMENT (OUTPATIENT)
Dept: PULMONOLOGY | Facility: CLINIC | Age: 43
End: 2023-02-13
Payer: MEDICAID

## 2023-02-13 VITALS
DIASTOLIC BLOOD PRESSURE: 85 MMHG | HEART RATE: 101 BPM | SYSTOLIC BLOOD PRESSURE: 124 MMHG | WEIGHT: 119 LBS | OXYGEN SATURATION: 99 % | BODY MASS INDEX: 17.63 KG/M2 | TEMPERATURE: 96 F | HEIGHT: 69 IN

## 2023-02-13 DIAGNOSIS — F17.200 NICOTINE DEPENDENCE, UNSPECIFIED, UNCOMPLICATED: ICD-10-CM

## 2023-02-13 PROCEDURE — 99214 OFFICE O/P EST MOD 30 MIN: CPT

## 2023-02-15 PROBLEM — F17.200 CURRENT EVERY DAY SMOKER: Status: ACTIVE | Noted: 2020-09-02

## 2023-02-15 NOTE — ASSESSMENT
[FreeTextEntry1] : 1. centrilobular emphysema with minimal clinical symptoms\par - PFT with moderately decreased diffusion c/w emphysema\par - extensive smoking cessation counseling provided\par pt not interested at this time\par \par 2. Left lingular ill defined area of GGO - resolved on CT from Sept 2022\par - would cont yearly surveillance given h/o smoking and personal h/o ca\par - smoking cessation counseling\par

## 2023-02-15 NOTE — CONSULT LETTER
[Dear  ___] : Dear  [unfilled], [Consult Letter:] : I had the pleasure of evaluating your patient, [unfilled]. [Please see my note below.] : Please see my note below. [Consult Closing:] : Thank you very much for allowing me to participate in the care of this patient.  If you have any questions, please do not hesitate to contact me. [Sincerely,] : Sincerely, [FreeTextEntry3] : Jie Pierre MD, [facility]

## 2023-02-15 NOTE — HISTORY OF PRESENT ILLNESS
[TextBox_4] : Patient is 43 yo male is an active smoker with PMHx Asthma on Ventolin [place] and Prostate Cancer s/p proctectomy 06/2021. Patient is referred to clinic for abnormal CT chest results from his oncologist, Dr. Matias. He smokes a pack every 3 days. He states minimal use of inhaler. Also endorses seasonal allergies, only aggregated with pollen and outdoor allergens, not currently taking any medications. Patient is vaccinated against COVID x 2, not vaccinated against the flu. \par  Patient denies any recent sick contacts or illnesses.

## 2023-03-13 ENCOUNTER — APPOINTMENT (OUTPATIENT)
Dept: UROLOGY | Facility: CLINIC | Age: 43
End: 2023-03-13
Payer: MEDICAID

## 2023-03-13 VITALS — HEART RATE: 123 BPM | DIASTOLIC BLOOD PRESSURE: 94 MMHG | SYSTOLIC BLOOD PRESSURE: 138 MMHG

## 2023-03-13 DIAGNOSIS — N39.3 STRESS INCONTINENCE (FEMALE) (MALE): ICD-10-CM

## 2023-03-13 DIAGNOSIS — C61 MALIGNANT NEOPLASM OF PROSTATE: ICD-10-CM

## 2023-03-13 PROCEDURE — 99212 OFFICE O/P EST SF 10 MIN: CPT

## 2023-03-19 NOTE — HISTORY OF PRESENT ILLNESS
[FreeTextEntry1] : Here for follow up.\par Continues to have very mild stress incontinence.\par Only wears one pad at night.  No daytime pads.\par \par Unable to receive the rx for sildenafil due to cost.\par Has noted partial erectile function recovery.\par \par No other new medical hx.

## 2023-03-19 NOTE — DISEASE MANAGEMENT
[1] : T1 [c] : c [0] : N0 [10-20] : 10 - 20 ng/mL [Biopsy] : Patient had a biopsy on [6] : Template Biopsy Ara Score: 6 [] : Patient had a Prostate MRI [Neg] : Negative [I] : I [Radical Prostatectomy] : Radical Prostatectomy [Patient had a radical prostatectomy] : Patient had a radical prostatectomy  [7(3+4)] : Ara Score 7(3+4) [Negative] : Negative margins [2] : T2 [X] : NX [BiopsyDate] : 9/18/2020 [TotalCores] : 12 [TotalPositiveCores] : 2 [FreeTextEntry7] : PSA at diagnosis 11.2 ng/mL.\par MRI done at [facility]: reported as PIRAD 4 but no lesion detected. [MaxCoreInvolvement] : 10% [RadicalProstatectomyDate] : 6/4/2021 [TotalNumberofnodesresected] : 0 [PositiveNodes] : 0

## 2023-03-22 LAB — PSA SERPL-MCNC: <0.01 NG/ML

## 2023-05-25 NOTE — ASU PATIENT PROFILE, ADULT - AS SC BRADEN NUTRITION
Show Aperture Variable?: No Render Post-Care Instructions In Note?: yes Detail Level: Detailed Post-Care Instructions: We verbally reviewed with the patient in detail post-care instructions. Patient is to wear sun protection and avoid picking at any of the treated lesions. Pt may apply Vaseline to crusted or scabbing areas. Call with any concerns. Should any area treated toady recur, RTC for evaluation and further management. Consent: The patient's verbal consent was obtained including but not limited to risks of crusting, scabbing, blistering, scarring, darker or lighter pigmentary change, recurrence, incomplete removal and infection. Number Of Freeze-Thaw Cycles: 1 freeze-thaw cycle Duration Of Freeze Thaw-Cycle (Seconds): 5 Application Tool (Optional): Cry-AC (3) adequate

## 2023-08-03 ENCOUNTER — APPOINTMENT (OUTPATIENT)
Dept: PULMONOLOGY | Facility: CLINIC | Age: 43
End: 2023-08-03
Payer: MEDICAID

## 2023-08-03 VITALS
BODY MASS INDEX: 17.48 KG/M2 | OXYGEN SATURATION: 97 % | SYSTOLIC BLOOD PRESSURE: 135 MMHG | TEMPERATURE: 98 F | RESPIRATION RATE: 16 BRPM | DIASTOLIC BLOOD PRESSURE: 95 MMHG | WEIGHT: 118 LBS | HEART RATE: 111 BPM | HEIGHT: 69 IN

## 2023-08-03 PROCEDURE — 99214 OFFICE O/P EST MOD 30 MIN: CPT | Mod: 25

## 2023-08-03 PROCEDURE — 99406 BEHAV CHNG SMOKING 3-10 MIN: CPT

## 2023-08-03 NOTE — CONSULT LETTER
[Dear  ___] : Dear  [unfilled], [Consult Letter:] : I had the pleasure of evaluating your patient, [unfilled]. [Please see my note below.] : Please see my note below. [Consult Closing:] : Thank you very much for allowing me to participate in the care of this patient.  If you have any questions, please do not hesitate to contact me. [Sincerely,] : Sincerely, [FreeTextEntry3] : Jie Pierre MD, [facility]

## 2023-08-03 NOTE — HISTORY OF PRESENT ILLNESS
[TextBox_4] : Patient is 43 yo male is an active smoker with PMHx Asthma on Ventolin [place] and Prostate Cancer s/p proctectomy 06/2021. Patient is referred to clinic for abnormal CT chest results from his oncologist, Dr. Matias. He smokes a pack every 3 days. He states minimal use of inhaler. Also endorses seasonal allergies, only aggregated with pollen and outdoor allergens, not currently taking any medications. Patient is vaccinated against COVID x 2, not vaccinated against the flu.   Patient denies any recent sick contacts or illnesses.   8/3/23 Pt here for follow up. Reports his asthma is acting up more. NO wheezing, no coughing, no sob or cunningham. Not using albuterol more. Feels humid air is poorly tolerated. Down to 2 cig/day

## 2023-08-03 NOTE — REVIEW OF SYSTEMS
[Cough] : no cough [Chest Tightness] : chest tightness [SOB on Exertion] : no sob on exertion [Seasonal Allergies] : seasonal allergies [Negative] : Endocrine [TextBox_30] : Rarely in the AM upon awakening

## 2023-08-03 NOTE — ADDENDUM
[FreeTextEntry1] : Included in this visit: Pre-visit preparation reviewing all notes, records and prior consultations reviewing all appropriate labs and imaging Medication reconciliation performing all necessary physical exam and diagnostic testing Counseling patient on their condition and plan of care Coordination of care Completion of notes and documentation

## 2023-08-03 NOTE — ASSESSMENT
[FreeTextEntry1] : 1. centrilobular emphysema with minimal clinical symptoms - PFT with moderately decreased diffusion c/w emphysema - extensive smoking cessation counseling provided pt not interested at this time  2. Left lingular ill defined area of GGO - resolved on CT from Sept 2022 - too young for LDCT for LCS at this time. - Smoking cessation counseling provided at length, all pharma and non-pharma options were discussed, risks of continued smoking explained. 7min

## 2024-01-19 NOTE — PHYSICAL THERAPY INITIAL EVALUATION ADULT - SOCIAL CONCERNS
----- Message from Suzi Bacon MD sent at 1/19/2024  7:34 AM CST -----  Review labs- blood count, electrolytes, kidney, liver   She has not been on humira since 12/2023- lets see about restarting authorization for humira- may need reinduction. Once approved then we may see her sooner in clinic. Let pt know this too  Lost to f/u and has appt with me in March  Vitamin D is low- see if she is taking any supplementation if not start high dose weekly vit D and give her 5 refills  B12 low- see if she is taking B12 and ask her to start vit B12 1000 mcg daily   
Called & spoke to pt  - Reviewed labs including stable blood count & normal electrolytes, kidney, & liver function  - Vitamin D low & not currently supplementing; will send in RX  - Vitamin B-12 low & not currently supplementing; will start OTC vitamin B12 1,000 mcg/ QD  - Informed of plan to start Humira auth & has not been on this since 12/2023  - Scheduled for sooner appt in February  - All questions answered   
Pt previously on humira 40mg SC weekly.   Has been off therapy for a month.   Plan to re induce then resume 40mg SC weekly injections  Scripts sent to [facility]  
None
no

## 2024-01-30 ENCOUNTER — APPOINTMENT (OUTPATIENT)
Dept: GASTROENTEROLOGY | Facility: CLINIC | Age: 44
End: 2024-01-30
Payer: MEDICAID

## 2024-01-30 ENCOUNTER — INPATIENT (INPATIENT)
Facility: HOSPITAL | Age: 44
LOS: 2 days | Discharge: HOME | DRG: 100 | End: 2024-02-02
Attending: STUDENT IN AN ORGANIZED HEALTH CARE EDUCATION/TRAINING PROGRAM | Admitting: STUDENT IN AN ORGANIZED HEALTH CARE EDUCATION/TRAINING PROGRAM
Payer: MEDICAID

## 2024-01-30 VITALS
BODY MASS INDEX: 16.14 KG/M2 | OXYGEN SATURATION: 100 % | HEIGHT: 69 IN | SYSTOLIC BLOOD PRESSURE: 99 MMHG | DIASTOLIC BLOOD PRESSURE: 65 MMHG | WEIGHT: 109 LBS | TEMPERATURE: 97.1 F | HEART RATE: 75 BPM

## 2024-01-30 VITALS
WEIGHT: 110.89 LBS | SYSTOLIC BLOOD PRESSURE: 96 MMHG | TEMPERATURE: 98 F | DIASTOLIC BLOOD PRESSURE: 90 MMHG | HEIGHT: 69 IN | RESPIRATION RATE: 16 BRPM | HEART RATE: 60 BPM | OXYGEN SATURATION: 96 %

## 2024-01-30 DIAGNOSIS — N12 TUBULO-INTERSTITIAL NEPHRITIS, NOT SPECIFIED AS ACUTE OR CHRONIC: ICD-10-CM

## 2024-01-30 DIAGNOSIS — Z98.890 OTHER SPECIFIED POSTPROCEDURAL STATES: Chronic | ICD-10-CM

## 2024-01-30 DIAGNOSIS — N17.9 ACUTE KIDNEY FAILURE, UNSPECIFIED: ICD-10-CM

## 2024-01-30 DIAGNOSIS — E87.6 HYPOKALEMIA: ICD-10-CM

## 2024-01-30 DIAGNOSIS — Z87.898 PERSONAL HISTORY OF OTHER SPECIFIED CONDITIONS: ICD-10-CM

## 2024-01-30 DIAGNOSIS — Z87.828 PERSONAL HISTORY OF OTHER (HEALED) PHYSICAL INJURY AND TRAUMA: Chronic | ICD-10-CM

## 2024-01-30 DIAGNOSIS — E87.1 HYPO-OSMOLALITY AND HYPONATREMIA: ICD-10-CM

## 2024-01-30 DIAGNOSIS — D64.9 ANEMIA, UNSPECIFIED: ICD-10-CM

## 2024-01-30 DIAGNOSIS — R55 SYNCOPE AND COLLAPSE: ICD-10-CM

## 2024-01-30 DIAGNOSIS — F10.10 ALCOHOL ABUSE, UNCOMPLICATED: ICD-10-CM

## 2024-01-30 DIAGNOSIS — R56.9 UNSPECIFIED CONVULSIONS: ICD-10-CM

## 2024-01-30 DIAGNOSIS — Z29.9 ENCOUNTER FOR PROPHYLACTIC MEASURES, UNSPECIFIED: ICD-10-CM

## 2024-01-30 LAB
ALBUMIN SERPL ELPH-MCNC: 3.4 G/DL — LOW (ref 3.5–5)
ALP SERPL-CCNC: 63 U/L — SIGNIFICANT CHANGE UP (ref 40–120)
ALT FLD-CCNC: 12 U/L DA — SIGNIFICANT CHANGE UP (ref 10–60)
AMMONIA BLD-MCNC: 81 UMOL/L — HIGH (ref 11–32)
AMPHET UR-MCNC: NEGATIVE — SIGNIFICANT CHANGE UP
ANION GAP SERPL CALC-SCNC: 14 MMOL/L — SIGNIFICANT CHANGE UP (ref 5–17)
ANION GAP SERPL CALC-SCNC: 15 MMOL/L — SIGNIFICANT CHANGE UP (ref 5–17)
APPEARANCE UR: CLEAR — SIGNIFICANT CHANGE UP
AST SERPL-CCNC: 11 U/L — SIGNIFICANT CHANGE UP (ref 10–40)
BARBITURATES UR SCN-MCNC: NEGATIVE — SIGNIFICANT CHANGE UP
BASOPHILS # BLD AUTO: 0 K/UL — SIGNIFICANT CHANGE UP (ref 0–0.2)
BASOPHILS NFR BLD AUTO: 0 % — SIGNIFICANT CHANGE UP (ref 0–2)
BENZODIAZ UR-MCNC: NEGATIVE — SIGNIFICANT CHANGE UP
BILIRUB SERPL-MCNC: 0.7 MG/DL — SIGNIFICANT CHANGE UP (ref 0.2–1.2)
BILIRUB UR-MCNC: NEGATIVE — SIGNIFICANT CHANGE UP
BLD GP AB SCN SERPL QL: SIGNIFICANT CHANGE UP
BUN SERPL-MCNC: 78 MG/DL — HIGH (ref 7–18)
BUN SERPL-MCNC: 86 MG/DL — HIGH (ref 7–18)
CALCIUM SERPL-MCNC: 9.1 MG/DL — SIGNIFICANT CHANGE UP (ref 8.4–10.5)
CALCIUM SERPL-MCNC: 9.2 MG/DL — SIGNIFICANT CHANGE UP (ref 8.4–10.5)
CHLORIDE SERPL-SCNC: 88 MMOL/L — LOW (ref 96–108)
CHLORIDE SERPL-SCNC: 96 MMOL/L — SIGNIFICANT CHANGE UP (ref 96–108)
CK SERPL-CCNC: 36 U/L — SIGNIFICANT CHANGE UP (ref 35–232)
CO2 SERPL-SCNC: 16 MMOL/L — LOW (ref 22–31)
CO2 SERPL-SCNC: 21 MMOL/L — LOW (ref 22–31)
COCAINE METAB.OTHER UR-MCNC: NEGATIVE — SIGNIFICANT CHANGE UP
COLOR SPEC: YELLOW — SIGNIFICANT CHANGE UP
CREAT ?TM UR-MCNC: 36 MG/DL — SIGNIFICANT CHANGE UP
CREAT SERPL-MCNC: 2.57 MG/DL — HIGH (ref 0.5–1.3)
CREAT SERPL-MCNC: 3.13 MG/DL — HIGH (ref 0.5–1.3)
DIFF PNL FLD: ABNORMAL
EGFR: 24 ML/MIN/1.73M2 — LOW
EGFR: 31 ML/MIN/1.73M2 — LOW
EOSINOPHIL # BLD AUTO: 0.06 K/UL — SIGNIFICANT CHANGE UP (ref 0–0.5)
EOSINOPHIL NFR BLD AUTO: 1 % — SIGNIFICANT CHANGE UP (ref 0–6)
ETHANOL SERPL-MCNC: 165 MG/DL — HIGH (ref 0–10)
FLUAV AG NPH QL: SIGNIFICANT CHANGE UP
FLUBV AG NPH QL: SIGNIFICANT CHANGE UP
GAS PNL BLDV: SIGNIFICANT CHANGE UP
GLUCOSE SERPL-MCNC: 116 MG/DL — HIGH (ref 70–99)
GLUCOSE SERPL-MCNC: 98 MG/DL — SIGNIFICANT CHANGE UP (ref 70–99)
GLUCOSE UR QL: NEGATIVE MG/DL — SIGNIFICANT CHANGE UP
HCT VFR BLD CALC: 26.7 % — LOW (ref 39–50)
HCT VFR BLD CALC: 26.7 % — LOW (ref 39–50)
HGB BLD-MCNC: 9.2 G/DL — LOW (ref 13–17)
HGB BLD-MCNC: 9.2 G/DL — LOW (ref 13–17)
KETONES UR-MCNC: NEGATIVE MG/DL — SIGNIFICANT CHANGE UP
LACTATE SERPL-SCNC: 2.3 MMOL/L — HIGH (ref 0.7–2)
LEUKOCYTE ESTERASE UR-ACNC: ABNORMAL
LIDOCAIN IGE QN: 165 U/L — HIGH (ref 13–75)
LYMPHOCYTES # BLD AUTO: 0.58 K/UL — LOW (ref 1–3.3)
LYMPHOCYTES # BLD AUTO: 9 % — LOW (ref 13–44)
MAGNESIUM SERPL-MCNC: 1.3 MG/DL — LOW (ref 1.6–2.6)
MAGNESIUM SERPL-MCNC: 1.7 MG/DL — SIGNIFICANT CHANGE UP (ref 1.6–2.6)
MCHC RBC-ENTMCNC: 28.6 PG — SIGNIFICANT CHANGE UP (ref 27–34)
MCHC RBC-ENTMCNC: 28.7 PG — SIGNIFICANT CHANGE UP (ref 27–34)
MCHC RBC-ENTMCNC: 34.5 GM/DL — SIGNIFICANT CHANGE UP (ref 32–36)
MCHC RBC-ENTMCNC: 34.5 GM/DL — SIGNIFICANT CHANGE UP (ref 32–36)
MCV RBC AUTO: 82.9 FL — SIGNIFICANT CHANGE UP (ref 80–100)
MCV RBC AUTO: 83.2 FL — SIGNIFICANT CHANGE UP (ref 80–100)
METHADONE UR-MCNC: NEGATIVE — SIGNIFICANT CHANGE UP
MONOCYTES # BLD AUTO: 0.71 K/UL — SIGNIFICANT CHANGE UP (ref 0–0.9)
MONOCYTES NFR BLD AUTO: 11 % — SIGNIFICANT CHANGE UP (ref 2–14)
NEUTROPHILS # BLD AUTO: 5.03 K/UL — SIGNIFICANT CHANGE UP (ref 1.8–7.4)
NEUTROPHILS NFR BLD AUTO: 76 % — SIGNIFICANT CHANGE UP (ref 43–77)
NITRITE UR-MCNC: NEGATIVE — SIGNIFICANT CHANGE UP
NRBC # BLD: 0 /100 WBCS — SIGNIFICANT CHANGE UP (ref 0–0)
NT-PROBNP SERPL-SCNC: 237 PG/ML — HIGH (ref 0–125)
OPIATES UR-MCNC: NEGATIVE — SIGNIFICANT CHANGE UP
OSMOLALITY SERPL: 302 MOSMOL/KG — HIGH (ref 275–300)
OSMOLALITY UR: 186 MOS/KG — SIGNIFICANT CHANGE UP (ref 50–1200)
PCP SPEC-MCNC: SIGNIFICANT CHANGE UP
PCP UR-MCNC: NEGATIVE — SIGNIFICANT CHANGE UP
PH UR: 5.5 — SIGNIFICANT CHANGE UP (ref 5–8)
PLATELET # BLD AUTO: 564 K/UL — HIGH (ref 150–400)
PLATELET # BLD AUTO: 572 K/UL — HIGH (ref 150–400)
POTASSIUM SERPL-MCNC: 2.8 MMOL/L — CRITICAL LOW (ref 3.5–5.3)
POTASSIUM SERPL-MCNC: 3.9 MMOL/L — SIGNIFICANT CHANGE UP (ref 3.5–5.3)
POTASSIUM SERPL-SCNC: 2.8 MMOL/L — CRITICAL LOW (ref 3.5–5.3)
POTASSIUM SERPL-SCNC: 3.9 MMOL/L — SIGNIFICANT CHANGE UP (ref 3.5–5.3)
POTASSIUM UR-SCNC: 2 MMOL/L — SIGNIFICANT CHANGE UP
PROLACTIN SERPL-MCNC: 27.3 NG/ML — HIGH (ref 4.1–18.4)
PROT ?TM UR-MCNC: 7 MG/DL — SIGNIFICANT CHANGE UP (ref 0–12)
PROT SERPL-MCNC: 6.9 G/DL — SIGNIFICANT CHANGE UP (ref 6–8.3)
PROT UR-MCNC: NEGATIVE MG/DL — SIGNIFICANT CHANGE UP
RBC # BLD: 3.21 M/UL — LOW (ref 4.2–5.8)
RBC # BLD: 3.22 M/UL — LOW (ref 4.2–5.8)
RBC # FLD: 15.9 % — HIGH (ref 10.3–14.5)
RBC # FLD: 16.6 % — HIGH (ref 10.3–14.5)
SARS-COV-2 RNA SPEC QL NAA+PROBE: SIGNIFICANT CHANGE UP
SODIUM SERPL-SCNC: 123 MMOL/L — LOW (ref 135–145)
SODIUM SERPL-SCNC: 127 MMOL/L — LOW (ref 135–145)
SODIUM UR-SCNC: 16 MMOL/L — SIGNIFICANT CHANGE UP
SP GR SPEC: 1.01 — SIGNIFICANT CHANGE UP (ref 1–1.03)
THC UR QL: NEGATIVE — SIGNIFICANT CHANGE UP
TROPONIN I, HIGH SENSITIVITY RESULT: 8 NG/L — SIGNIFICANT CHANGE UP
TSH SERPL-MCNC: 1.5 UU/ML — SIGNIFICANT CHANGE UP (ref 0.34–4.82)
UROBILINOGEN FLD QL: 0.2 MG/DL — SIGNIFICANT CHANGE UP (ref 0.2–1)
WBC # BLD: 6.45 K/UL — SIGNIFICANT CHANGE UP (ref 3.8–10.5)
WBC # BLD: 7.04 K/UL — SIGNIFICANT CHANGE UP (ref 3.8–10.5)
WBC # FLD AUTO: 6.45 K/UL — SIGNIFICANT CHANGE UP (ref 3.8–10.5)
WBC # FLD AUTO: 7.04 K/UL — SIGNIFICANT CHANGE UP (ref 3.8–10.5)

## 2024-01-30 PROCEDURE — 74176 CT ABD & PELVIS W/O CONTRAST: CPT | Mod: 26,MA

## 2024-01-30 PROCEDURE — 71045 X-RAY EXAM CHEST 1 VIEW: CPT | Mod: 26

## 2024-01-30 PROCEDURE — 99223 1ST HOSP IP/OBS HIGH 75: CPT | Mod: GC

## 2024-01-30 PROCEDURE — 99285 EMERGENCY DEPT VISIT HI MDM: CPT

## 2024-01-30 PROCEDURE — 99204 OFFICE O/P NEW MOD 45 MIN: CPT

## 2024-01-30 PROCEDURE — 70450 CT HEAD/BRAIN W/O DYE: CPT | Mod: 26,MA

## 2024-01-30 RX ORDER — LATANOPROST 0.05 MG/ML
1 SOLUTION/ DROPS OPHTHALMIC; TOPICAL
Refills: 0 | DISCHARGE

## 2024-01-30 RX ORDER — CHOLECALCIFEROL (VITAMIN D3) 125 MCG
2 CAPSULE ORAL
Refills: 0 | DISCHARGE

## 2024-01-30 RX ORDER — THIAMINE MONONITRATE (VIT B1) 100 MG
500 TABLET ORAL EVERY 8 HOURS
Refills: 0 | Status: DISCONTINUED | OUTPATIENT
Start: 2024-01-30 | End: 2024-01-31

## 2024-01-30 RX ORDER — SODIUM CHLORIDE 9 MG/ML
1000 INJECTION, SOLUTION INTRAVENOUS
Refills: 0 | Status: DISCONTINUED | OUTPATIENT
Start: 2024-01-30 | End: 2024-01-31

## 2024-01-30 RX ORDER — CHOLECALCIFEROL (VITAMIN D3) 125 MCG
2000 CAPSULE ORAL DAILY
Refills: 0 | Status: DISCONTINUED | OUTPATIENT
Start: 2024-01-30 | End: 2024-02-02

## 2024-01-30 RX ORDER — MAGNESIUM SULFATE 500 MG/ML
1 VIAL (ML) INJECTION ONCE
Refills: 0 | Status: COMPLETED | OUTPATIENT
Start: 2024-01-30 | End: 2024-01-30

## 2024-01-30 RX ORDER — METOPROLOL TARTRATE 50 MG
1 TABLET ORAL
Qty: 0 | Refills: 0 | DISCHARGE

## 2024-01-30 RX ORDER — THIAMINE MONONITRATE (VIT B1) 100 MG
100 TABLET ORAL ONCE
Refills: 0 | Status: COMPLETED | OUTPATIENT
Start: 2024-01-30 | End: 2024-01-30

## 2024-01-30 RX ORDER — HEPARIN SODIUM 5000 [USP'U]/ML
5000 INJECTION INTRAVENOUS; SUBCUTANEOUS EVERY 8 HOURS
Refills: 0 | Status: DISCONTINUED | OUTPATIENT
Start: 2024-01-30 | End: 2024-02-02

## 2024-01-30 RX ORDER — ONDANSETRON 8 MG/1
4 TABLET, FILM COATED ORAL EVERY 8 HOURS
Refills: 0 | Status: DISCONTINUED | OUTPATIENT
Start: 2024-01-30 | End: 2024-02-02

## 2024-01-30 RX ORDER — PREGABALIN 225 MG/1
1 CAPSULE ORAL
Refills: 0 | DISCHARGE

## 2024-01-30 RX ORDER — ACETAMINOPHEN 500 MG
650 TABLET ORAL EVERY 6 HOURS
Refills: 0 | Status: DISCONTINUED | OUTPATIENT
Start: 2024-01-30 | End: 2024-02-02

## 2024-01-30 RX ORDER — POTASSIUM CHLORIDE 20 MEQ
10 PACKET (EA) ORAL
Refills: 0 | Status: DISCONTINUED | OUTPATIENT
Start: 2024-01-30 | End: 2024-01-30

## 2024-01-30 RX ORDER — CEFTRIAXONE 500 MG/1
1000 INJECTION, POWDER, FOR SOLUTION INTRAMUSCULAR; INTRAVENOUS EVERY 24 HOURS
Refills: 0 | Status: DISCONTINUED | OUTPATIENT
Start: 2024-01-31 | End: 2024-02-02

## 2024-01-30 RX ORDER — MAGNESIUM SULFATE 500 MG/ML
1 VIAL (ML) INJECTION ONCE
Refills: 0 | Status: COMPLETED | OUTPATIENT
Start: 2024-01-30 | End: 2024-01-31

## 2024-01-30 RX ORDER — ALBUTEROL 90 UG/1
2 AEROSOL, METERED ORAL
Qty: 0 | Refills: 0 | DISCHARGE

## 2024-01-30 RX ORDER — PREGABALIN 225 MG/1
1000 CAPSULE ORAL DAILY
Refills: 0 | Status: DISCONTINUED | OUTPATIENT
Start: 2024-01-30 | End: 2024-02-02

## 2024-01-30 RX ORDER — SODIUM CHLORIDE 9 MG/ML
1000 INJECTION INTRAMUSCULAR; INTRAVENOUS; SUBCUTANEOUS ONCE
Refills: 0 | Status: COMPLETED | OUTPATIENT
Start: 2024-01-30 | End: 2024-01-30

## 2024-01-30 RX ORDER — CEFTRIAXONE 500 MG/1
1000 INJECTION, POWDER, FOR SOLUTION INTRAMUSCULAR; INTRAVENOUS ONCE
Refills: 0 | Status: COMPLETED | OUTPATIENT
Start: 2024-01-30 | End: 2024-01-30

## 2024-01-30 RX ORDER — FOLIC ACID 0.8 MG
1 TABLET ORAL DAILY
Refills: 0 | Status: DISCONTINUED | OUTPATIENT
Start: 2024-01-30 | End: 2024-02-02

## 2024-01-30 RX ORDER — FOLIC ACID 0.8 MG
1 TABLET ORAL
Qty: 0 | Refills: 0 | DISCHARGE

## 2024-01-30 RX ORDER — METOPROLOL TARTRATE 50 MG
1 TABLET ORAL
Refills: 0 | DISCHARGE

## 2024-01-30 RX ORDER — POTASSIUM CHLORIDE 20 MEQ
40 PACKET (EA) ORAL
Refills: 0 | Status: DISCONTINUED | OUTPATIENT
Start: 2024-01-30 | End: 2024-02-02

## 2024-01-30 RX ORDER — LATANOPROST 0.05 MG/ML
1 SOLUTION/ DROPS OPHTHALMIC; TOPICAL AT BEDTIME
Refills: 0 | Status: DISCONTINUED | OUTPATIENT
Start: 2024-01-30 | End: 2024-02-02

## 2024-01-30 RX ORDER — VALSARTAN 80 MG/1
1 TABLET ORAL
Refills: 0 | DISCHARGE

## 2024-01-30 RX ORDER — LEVETIRACETAM 250 MG/1
750 TABLET, FILM COATED ORAL
Refills: 0 | Status: DISCONTINUED | OUTPATIENT
Start: 2024-01-30 | End: 2024-02-02

## 2024-01-30 RX ADMIN — Medication 1 GRAM(S): at 15:20

## 2024-01-30 RX ADMIN — Medication 100 MILLIGRAM(S): at 14:18

## 2024-01-30 RX ADMIN — Medication 100 MILLIEQUIVALENT(S): at 15:13

## 2024-01-30 RX ADMIN — LEVETIRACETAM 750 MILLIGRAM(S): 250 TABLET, FILM COATED ORAL at 18:33

## 2024-01-30 RX ADMIN — SODIUM CHLORIDE 1000 MILLILITER(S): 9 INJECTION INTRAMUSCULAR; INTRAVENOUS; SUBCUTANEOUS at 12:26

## 2024-01-30 RX ADMIN — CEFTRIAXONE 1000 MILLIGRAM(S): 500 INJECTION, POWDER, FOR SOLUTION INTRAMUSCULAR; INTRAVENOUS at 15:14

## 2024-01-30 RX ADMIN — CEFTRIAXONE 100 MILLIGRAM(S): 500 INJECTION, POWDER, FOR SOLUTION INTRAMUSCULAR; INTRAVENOUS at 15:12

## 2024-01-30 RX ADMIN — Medication 100 GRAM(S): at 14:21

## 2024-01-30 RX ADMIN — Medication 100 MILLIEQUIVALENT(S): at 16:15

## 2024-01-30 RX ADMIN — SODIUM CHLORIDE 1000 MILLILITER(S): 9 INJECTION INTRAMUSCULAR; INTRAVENOUS; SUBCUTANEOUS at 14:00

## 2024-01-30 RX ADMIN — Medication 40 MILLIEQUIVALENT(S): at 18:34

## 2024-01-30 NOTE — ED PROVIDER NOTE - CLINICAL SUMMARY MEDICAL DECISION MAKING FREE TEXT BOX
43-year-old male history of epilepsy on Keppra, alcohol use but no history of withdrawals, prostate cancer status post radical resection in 2021, hypertension presenting to the ED with a unresponsive episode at doctor's office where patient was tensing up and having slight shakes with eyes rolling back witnessed by sister and doctor.  Episode lasted about 30 seconds, and patient appeared to be confused per sister  that lasted for 3 minutes but came back to his baseline after. now at his baseline..   Patient did not fall or hit his head, remained in the chair the whole time. Took his Keppra today and has not been missing any doses.  Unsure of any urinary incontinence at the time as patient wears a diaper and is noted to be urinary incontinent.  Patient has been feeling generalized weakness for many months now and does not eat much per sister.  Patient had a pint of liquor and some beers last night.  Has generalized belly pains that are chronic for him.  No nausea, vomiting.  Did have some chest pain at the time, nonspecific.  No shortness of breath.  Patient states he feels "cold".  Feels like he might have had a URI about a week ago.   evaluation differential includes but is not limited to:  patient possibly had a breakthrough seizure.  we will get labs to evaluate for cause.  Will also evaluate for possible infectious source and will get a rectal temperature.   Will give IV fluids, get EKG and reassess. PS @ 0115  RE: Consult for acute appendicitis per NP Reyna Cloud @ [phone]

## 2024-01-30 NOTE — ED ADULT NURSE NOTE - NSFALLUNIVINTERV_ED_ALL_ED
Bed/Stretcher in lowest position, wheels locked, appropriate side rails in place/Call bell, personal items and telephone in reach/Instruct patient to call for assistance before getting out of bed/chair/stretcher/Non-slip footwear applied when patient is off stretcher/[place] to call system/Physically safe environment - no spills, clutter or unnecessary equipment/Purposeful proactive rounding/Room/bathroom lighting operational, light cord in reach

## 2024-01-30 NOTE — H&P ADULT - PROBLEM SELECTOR PLAN 7
s/p CTX 1 gr  on prior UCX ( 2021) pt grew Serratia pansensitive  continue with CTX 1 gr QD  f/u BCX and UCX

## 2024-01-30 NOTE — H&P ADULT - PROBLEM SELECTOR PLAN 3
likely hypovolemic hyponatremia in the s/o poor PO intake and GI losses  s/p 1 L NS  f/u repeat BMP to monitor Na and avoid overcorrection  f/u serum osm, urine osm and urine Na likely hypovolemic hyponatremia in the s/o poor PO intake and GI losses  s/p 1 L NS  Na improved from 123 to 127  continue IVF 0.45 NS @ 75cc/hr  BMP Q 4 hrs (Next BMP 1/30 at 11 PM)  Goal correction in 24 hrs 129-131  f/u serum osm, urine osm and urine Na

## 2024-01-30 NOTE — H&P ADULT - NSHPPHYSICALEXAM_GEN_ALL_CORE
LOS:     VITALS:   T(C): 36.8 (01-30-24 @ 15:41), Max: 36.8 (01-30-24 @ 12:26)  HR: 74 (01-30-24 @ 15:41) (60 - 78)  BP: 97/62 (01-30-24 @ 15:41) (96/90 - 97/62)  RR: 16 (01-30-24 @ 15:41) (16 - 16)  SpO2: 100% (01-30-24 @ 15:41) (96% - 100%)    GENERAL: NAD, lying in bed comfortably, weak appearing  HEAD:  Atraumatic, Normocephalic  EYES: EOMI, PERRLA, conjunctiva and sclera clear  ENT: mildly dry mucous membranes  NECK: Supple, No JVD  CHEST/LUNG: Clear to auscultation bilaterally; No rales, rhonchi, wheezing, or rubs. Unlabored respirations  HEART: Regular rate and rhythm; No murmurs, rubs, or gallops  ABDOMEN: Surgical scar mid abdominal line, BSx4; Soft, nontender, nondistended  EXTREMITIES:  2+ Peripheral Pulses, brisk capillary refill. No clubbing, cyanosis, or edema  NERVOUS SYSTEM:  A&Ox2 (oriented to self and time), no focal deficits , no tremors   SKIN: No rashes or lesions LOS:     VITALS:   T(C): 36.8 (01-30-24 @ 15:41), Max: 36.8 (01-30-24 @ 12:26)  HR: 74 (01-30-24 @ 15:41) (60 - 78)  BP: 97/62 (01-30-24 @ 15:41) (96/90 - 97/62)  RR: 16 (01-30-24 @ 15:41) (16 - 16)  SpO2: 100% (01-30-24 @ 15:41) (96% - 100%)    GENERAL: NAD, lying in bed comfortably, weak appearing  HEAD:  Atraumatic, Normocephalic  EYES: EOMI, PERRLA, conjunctiva and sclera clear  ENT: mildly dry mucous membranes  NECK: Supple, No JVD  CHEST/LUNG: Clear to auscultation bilaterally; No rales, rhonchi, wheezing, or rubs. Unlabored respirations  HEART: Regular rate and rhythm; No murmurs, rubs, or gallops  ABDOMEN: Surgical scar mid abdominal line, BSx4; Soft, nontender, nondistended  EXTREMITIES:  2+ Peripheral Pulses, brisk capillary refill. No clubbing, cyanosis, or edema  NERVOUS SYSTEM:  A&Ox2 (oriented to self and time), somnolent, no focal deficits , no tremors   SKIN: No rashes or lesions

## 2024-01-30 NOTE — H&P ADULT - PROBLEM SELECTOR PLAN 1
breakthrough seizures  Multifactorial in the s/o  questionable medication compliance,  ETOH intake as well as hypomagnesemia and hyponatremia which can lower seizure threshold  fall and seizure precautions  head elevation  resumed home dose of keppra, pt on Keppra 750 XL QD, will resume Keppra 750 mg PO ID  keep Mg >2  f/u prolactin, lactate and CK levels, Utox  f/u keppra levels breakthrough seizures  Multifactorial in the s/o  questionable medication compliance,  ETOH intake as well as hypomagnesemia and hyponatremia which can lower seizure threshold  fall and seizure precautions  head elevation  resumed home dose of keppra, pt on Keppra 750 XL QD, will resume Keppra 750 mg PO ID  keep Mg >2  f/u prolactin, lactate and CK levels, Utox  f/u keppra levels  Dr. Escobedo consulted breakthrough seizures  Multifactorial in the s/o  questionable medication compliance,  ETOH intake as well as hypomagnesemia and hyponatremia which can lower seizure threshold  fall and seizure precautions  head elevation  resumed home dose of keppra, pt on Keppra 750 XL QD, will resume Keppra 750 mg PO BID  keep Mg >2  f/u prolactin, lactate and CK levels, Utox  f/u keppra levels  Dr. Escobedo neurology consulted

## 2024-01-30 NOTE — H&P ADULT - PROBLEM SELECTOR PLAN 8
subacute diarrhea   low fiber diet  pt w/o white count, no hx of recent ABX use or hospitalization, Thus, low concern for C.diff  ordered w/u studies GI PCR,  Calprotectin, lactoferrin, stool culture subacute diarrhea   low fiber diet  ordered w/u studies GI PCR,  Calprotectin, lactoferrin, stool culture, C. diffc subacute diarrhea   low fiber diet  ordered w/u studies GI PCR,  Calprotectin, lactoferrin, stool culture, C. diffc PCR

## 2024-01-30 NOTE — H&P ADULT - ASSESSMENT
COMPLETE NOTE TO FOLLOW  43-year-old  male, w/ pmhx of  epilepsy on Keppra (questionable medication compliance), alcohol misuse (last drink last night, 1 pint of Vodka),  HTN, prostate CA. s/p radical resection in 2021, EMS  from  MD office after having a breakthrough seizure that lasted about 30 sec as per pt's sister with recovery of his baseline MS after 5 min. As per pt he has also been experiencing for the past 2 wks abdominal pain associated with 3-4 episodes of daily non-bloody loose stool that can be liquid at times, poor  PO intake, multiple episodes of nausea and vomiting. In the ED BP soft (BP 97/62), otherwise VSS. On assessment with CIWA score of 4 . In terms of labs with multiple electrolyte abnormalities ( hyponatremia, hypokalemia and Hypomagnesemia SCr 3.13  (Baseline  0.4) , Lact  3.2, ,  UA + pyuria and bacteriuria.  CTH non acute findings.  CT A/P b/l perinephric stranding,  Pt is being admitted for breakthrough seizures, hypovolemic hyponatremia, hypokalemia, hypomagnesemia , OSCAR and pyelonephritis.    43-year-old  male, w/ pmhx of  epilepsy on Keppra (questionable medication compliance), alcohol misuse (last drink last night, 1 pint of Vodka), hx ETOH withdrawal,  HTN, prostate CA. s/p radical resection in 2021, EMS  from  MD office after having a breakthrough seizure that lasted about 30 sec as per pt's sister with recovery of his baseline MS after 5 min. As per pt he has also been experiencing for the past 2 wks abdominal pain associated with 3-4 episodes of daily non-bloody loose stool that can be liquid at times, poor  PO intake, multiple episodes of nausea and vomiting. In the ED BP soft (BP 97/62), otherwise VSS. On assessment with CIWA score of 4 . In terms of labs with multiple electrolyte abnormalities ( hyponatremia, hypokalemia and Hypomagnesemia SCr 3.13  (Baseline  0.4) , Lact  3.2, ,  UA + pyuria and bacteriuria.  CTH non acute findings.  CT A/P b/l perinephric stranding,  Pt is being admitted for breakthrough seizures, hypovolemic hyponatremia, hypokalemia, hypomagnesemia , OSCAR and pyelonephritis.

## 2024-01-30 NOTE — H&P ADULT - PROBLEM SELECTOR PLAN 5
BUN/Cr >20  pre-renal in the s/o poor PO intake and ongoing GI losses  s/p 1L IVF in ED  f/u Repeat BMP  obtain urine lytes   choice of fluids based on repeat results BUN/Cr >20  pre-renal in the s/o poor PO intake and ongoing GI losses  s/p 1L IVF in ED  IVF as per above  f/u Repeat BMP  obtain urine lytes

## 2024-01-30 NOTE — H&P ADULT - PROBLEM SELECTOR PLAN 6
normocytic anemia  baseline ~ 10-11  on admission 9.2  no evidence of active bleeding  monitor H/H daily  active type and screen

## 2024-01-30 NOTE — H&P ADULT - HISTORY OF PRESENT ILLNESS
COMPLETE NOTE TO FOLLOW  43-year-old  male, leaves with his mother, ambulates w/ a cane with pmhx of  epilepsy on Keppra, alcohol misuse, HTN, prostate CA. s/p radical resection in 2021, currently undergoing OP w/u with GI Dr. Kerr for pancreatic cysts who was EMS to the ED from Dr. Kerr's office after he had an episode of being unresponsive. As per pt's sister Ms. Cannon who provided collateral information over the phone, the pt  slumped over to the side,  his whole body was stiff and then had generalized body shaking, he was rolling his eyes and was drooling, did not loose control of bladder or bowel. The episode lasted  for about 30 sec. As per pt's sister, he was a" little dazed", but then after about 5 min he was fairly back to baseline. Pt endorses last episode of seizure was about 2 months ago, he endores good compliance to his medications, however, family members are not sure whether or not he is actually compliant. Pt sister informs that he was seen by OP neurolgist 2 wks ago (Dr. De Santiago OP Neuro), who changed anti-seizure medication from BID to once a day.  Pt's sister also informs that pt drinks ETOH daily. However,  pt endorses he drinks on and off. As per pt, last drink was yesterday, drank 1 pint of Vodka.      At the time of my assessment pt informs that he feels weak and tired. Informs he has also been experiencing abdominal pain associated with 3-4 episodes of daily non-bloody loose stool that can be liquid at times for the past 2 wks. Has also had decreased PO intake, multiple episodes of Nausea and vomiting. Does not feel nauseous currently, denies HA, auditory, visual or tactile hallucinations.  Pt also endorses increased urinary frequency w/o dysuria. Pt and family deny recent hospitalizations or use of antibiotic       ED course  Tmax 98.2 HR 60-74 BP 97/62 O2SAT 100% on RA  Na 123, K 2.8, Mg 1.3  SCr 3.13 , Lact 3.2  UA + pyuria and bacteriuria  CTH diffuse brain parenchyma volume loss  CT A/P b/l perinephric stranding, diverticulosis w/o diverticulitis  s/p CTX 1 gr + 1 L NS + Mg +K replacement 43-year-old  male, leaves with his mother, ambulates w/ a cane with pmhx of  epilepsy on Keppra, alcohol misuse w/ ETOH withdrawal hx, HTN, prostate CA. s/p radical resection in 2021, currently undergoing OP w/u with GI Dr. Kerr for pancreatic cysts who was EMS to the ED from Dr. Kerr's office after he had an episode of being unresponsive. As per pt's sister Ms. Cannon who provided collateral information over the phone, the pt  slumped over to the side,  his whole body was stiff and then had generalized body shaking, he was rolling his eyes and was drooling, did not loose control of bladder or bowel. The episode lasted  for about 30 sec. As per pt's sister, he was a" little dazed", but then after about 5 min he was fairly back to baseline. Pt endorses last episode of seizure was about 2 months ago, he endorses good compliance to his medications, however, family members are not sure whether or not he is actually compliant. Pt sister informs that he was seen by OP neurologist 2 wks ago (Dr. De Santiago OP Neuro), who changed anti-seizure medication from BID to once a day.  Pt's sister also informs that pt drinks ETOH daily. However,  pt endorses he drinks on and off. As per pt, last drink was yesterday, drank 1 pint of Vodka.      At the time of my assessment pt informs that he feels weak and tired. Informs he has also been experiencing abdominal pain associated with 3-4 episodes of daily non-bloody loose stool that can be liquid at times for the past 2 wks. Has also had decreased PO intake, multiple episodes of Nausea and vomiting. Does not feel nauseous currently, denies HA, auditory, visual or tactile hallucinations.  Pt also endorses increased urinary frequency w/o dysuria. Pt and family deny recent hospitalizations or use of antibiotic       ED course  Tmax 98.2 HR 60-74 BP 97/62 O2SAT 100% on RA  Na 123, K 2.8, Mg 1.3  SCr 3.13 , Lact 3.2  UA + pyuria and bacteriuria  CTH diffuse brain parenchyma volume loss  CT A/P b/l perinephric stranding, diverticulosis w/o diverticulitis  s/p CTX 1 gr + 1 L NS + Mg +K replacement

## 2024-01-30 NOTE — H&P ADULT - PROBLEM SELECTOR PLAN 2
CIWA of 4  start pt on ativan PRN for symptom trigger   MV, FA and IV thiamine  TID for 3 days then continue PO daily  f/u Utox   SW consult placed CIWA of 4  start pt on ativan standing   MV, FA and IV thiamine  TID for 3 days then continue PO daily  f/u Utox   SW consult placed CIWA of 4  started on ativan standing   MV, FA and IV thiamine  TID for 3 days then continue PO daily  f/u Utox   SW consult placed

## 2024-01-30 NOTE — ED PROVIDER NOTE - PHYSICAL EXAMINATION
Const: Well-nourished, Well-developed, appearing stated age.  Eyes: no conjunctival injection, and symmetrical lids.  HEENT:   Dry mucous membranes  Neck: Symmetric, trachea midline.   CVS: +S1/S2, Peripheral pulses 2+ and equal in all extremities.  RESP: Unlabored respiratory effort. Clear to auscultation bilaterally.  GI: Nontender/Nondistended,  MSK: Normocephalic/Atraumatic, Lower Extremities w/o calf tenderness or edema b/l.   Skin: Warm, dry and intact.   Neuro: CNs II-XII grossly intact. Motor & Sensation grossly intact.  Psych: Awake, Alert, & Oriented (AAO) x3. Appropriate mood and affect.

## 2024-01-30 NOTE — ED ADULT TRIAGE NOTE - TEMPERATURE IN FAHRENHEIT (DEGREES F)
----- Message from Josef Ahmadi 97.8

## 2024-01-30 NOTE — ED PROVIDER NOTE - OBJECTIVE STATEMENT
43-year-old male history of epilepsy on Keppra, alcohol use but no history of withdrawals, prostate cancer status post radical resection in 2021, hypertension presenting to the ED with a unresponsive episode at doctor's office where patient was tensing up and having slight shakes with eyes rolling back witnessed by sister and doctor.  Episode lasted about 30 seconds, and patient appeared to be confused per sister for 3min at the time but   Was responding after.   Patient did not fall or hit his head, remained in the chair the whole time. Took his Keppra today and has not been missing any doses.  Unsure of any urinary incontinence at the time as patient wears a diaper and is noted to be urinary incontinent.  Patient has been feeling generalized weakness for many months now and does not eat much per sister.  Patient had a pint of liquor and some beers last night.  Has generalized belly pains that are chronic for him.  No nausea, vomiting.  Did have some chest pain at the time, nonspecific.  No shortness of breath.  Patient states he feels "cold".  Feels like he might have had a URI about a week ago.

## 2024-01-30 NOTE — H&P ADULT - PROBLEM SELECTOR PLAN 4
likely hypovolemic hyponatremia in the s/o poor PO intake and GI losses  f/u repeat BMP likely due to  poor PO intake and GI losses  repleted in the ED  f/u repeat BMP
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
## 2024-01-31 LAB
ALBUMIN SERPL ELPH-MCNC: 3.5 G/DL — SIGNIFICANT CHANGE UP (ref 3.5–5)
ALP SERPL-CCNC: 58 U/L — SIGNIFICANT CHANGE UP (ref 40–120)
ALT FLD-CCNC: 11 U/L DA — SIGNIFICANT CHANGE UP (ref 10–60)
ANION GAP SERPL CALC-SCNC: 11 MMOL/L — SIGNIFICANT CHANGE UP (ref 5–17)
AST SERPL-CCNC: 10 U/L — SIGNIFICANT CHANGE UP (ref 10–40)
BASOPHILS # BLD AUTO: 0.03 K/UL — SIGNIFICANT CHANGE UP (ref 0–0.2)
BASOPHILS NFR BLD AUTO: 0.4 % — SIGNIFICANT CHANGE UP (ref 0–2)
BILIRUB SERPL-MCNC: 0.7 MG/DL — SIGNIFICANT CHANGE UP (ref 0.2–1.2)
BUN SERPL-MCNC: 68 MG/DL — HIGH (ref 7–18)
C DIFF BY PCR RESULT: SIGNIFICANT CHANGE UP
CALCIUM SERPL-MCNC: 9.1 MG/DL — SIGNIFICANT CHANGE UP (ref 8.4–10.5)
CHLORIDE SERPL-SCNC: 104 MMOL/L — SIGNIFICANT CHANGE UP (ref 96–108)
CHOLEST SERPL-MCNC: 131 MG/DL — SIGNIFICANT CHANGE UP
CO2 SERPL-SCNC: 17 MMOL/L — LOW (ref 22–31)
CREAT SERPL-MCNC: 2.43 MG/DL — HIGH (ref 0.5–1.3)
EGFR: 33 ML/MIN/1.73M2 — LOW
EOSINOPHIL # BLD AUTO: 0.05 K/UL — SIGNIFICANT CHANGE UP (ref 0–0.5)
EOSINOPHIL NFR BLD AUTO: 0.7 % — SIGNIFICANT CHANGE UP (ref 0–6)
GI PCR PANEL: SIGNIFICANT CHANGE UP
GLUCOSE SERPL-MCNC: 104 MG/DL — HIGH (ref 70–99)
HCT VFR BLD CALC: 26.3 % — LOW (ref 39–50)
HDLC SERPL-MCNC: 75 MG/DL — SIGNIFICANT CHANGE UP
HGB BLD-MCNC: 8.8 G/DL — LOW (ref 13–17)
IMM GRANULOCYTES NFR BLD AUTO: 1.8 % — HIGH (ref 0–0.9)
LACTATE SERPL-SCNC: 1.5 MMOL/L — SIGNIFICANT CHANGE UP (ref 0.7–2)
LIPID PNL WITH DIRECT LDL SERPL: 46 MG/DL — SIGNIFICANT CHANGE UP
LYMPHOCYTES # BLD AUTO: 0.57 K/UL — LOW (ref 1–3.3)
LYMPHOCYTES # BLD AUTO: 7.4 % — LOW (ref 13–44)
MAGNESIUM SERPL-MCNC: 1.4 MG/DL — LOW (ref 1.6–2.6)
MCHC RBC-ENTMCNC: 28.5 PG — SIGNIFICANT CHANGE UP (ref 27–34)
MCHC RBC-ENTMCNC: 33.5 GM/DL — SIGNIFICANT CHANGE UP (ref 32–36)
MCV RBC AUTO: 85.1 FL — SIGNIFICANT CHANGE UP (ref 80–100)
MONOCYTES # BLD AUTO: 0.9 K/UL — SIGNIFICANT CHANGE UP (ref 0–0.9)
MONOCYTES NFR BLD AUTO: 11.7 % — SIGNIFICANT CHANGE UP (ref 2–14)
NEUTROPHILS # BLD AUTO: 5.98 K/UL — SIGNIFICANT CHANGE UP (ref 1.8–7.4)
NEUTROPHILS NFR BLD AUTO: 78 % — HIGH (ref 43–77)
NON HDL CHOLESTEROL: 56 MG/DL — SIGNIFICANT CHANGE UP
NRBC # BLD: 0 /100 WBCS — SIGNIFICANT CHANGE UP (ref 0–0)
PHOSPHATE SERPL-MCNC: 2.9 MG/DL — SIGNIFICANT CHANGE UP (ref 2.5–4.5)
PLATELET # BLD AUTO: 468 K/UL — HIGH (ref 150–400)
POTASSIUM SERPL-MCNC: 3.8 MMOL/L — SIGNIFICANT CHANGE UP (ref 3.5–5.3)
POTASSIUM SERPL-SCNC: 3.8 MMOL/L — SIGNIFICANT CHANGE UP (ref 3.5–5.3)
PROT SERPL-MCNC: 7.2 G/DL — SIGNIFICANT CHANGE UP (ref 6–8.3)
RBC # BLD: 3.09 M/UL — LOW (ref 4.2–5.8)
RBC # FLD: 16.2 % — HIGH (ref 10.3–14.5)
SODIUM SERPL-SCNC: 132 MMOL/L — LOW (ref 135–145)
TRIGL SERPL-MCNC: 50 MG/DL — SIGNIFICANT CHANGE UP
UUN UR-MCNC: 262 MG/DL — SIGNIFICANT CHANGE UP
WBC # BLD: 7.67 K/UL — SIGNIFICANT CHANGE UP (ref 3.8–10.5)
WBC # FLD AUTO: 7.67 K/UL — SIGNIFICANT CHANGE UP (ref 3.8–10.5)

## 2024-01-31 PROCEDURE — 99233 SBSQ HOSP IP/OBS HIGH 50: CPT

## 2024-01-31 RX ORDER — SODIUM CHLORIDE 9 MG/ML
500 INJECTION INTRAMUSCULAR; INTRAVENOUS; SUBCUTANEOUS ONCE
Refills: 0 | Status: COMPLETED | OUTPATIENT
Start: 2024-01-31 | End: 2024-01-31

## 2024-01-31 RX ORDER — THIAMINE MONONITRATE (VIT B1) 100 MG
100 TABLET ORAL DAILY
Refills: 0 | Status: DISCONTINUED | OUTPATIENT
Start: 2024-01-31 | End: 2024-02-02

## 2024-01-31 RX ORDER — MAGNESIUM SULFATE 500 MG/ML
2 VIAL (ML) INJECTION ONCE
Refills: 0 | Status: DISCONTINUED | OUTPATIENT
Start: 2024-01-31 | End: 2024-01-31

## 2024-01-31 RX ADMIN — Medication 100 GRAM(S): at 08:54

## 2024-01-31 RX ADMIN — PREGABALIN 1000 MICROGRAM(S): 225 CAPSULE ORAL at 12:01

## 2024-01-31 RX ADMIN — Medication 2000 UNIT(S): at 12:00

## 2024-01-31 RX ADMIN — LEVETIRACETAM 750 MILLIGRAM(S): 250 TABLET, FILM COATED ORAL at 07:21

## 2024-01-31 RX ADMIN — CEFTRIAXONE 100 MILLIGRAM(S): 500 INJECTION, POWDER, FOR SOLUTION INTRAMUSCULAR; INTRAVENOUS at 16:24

## 2024-01-31 RX ADMIN — LATANOPROST 1 DROP(S): 0.05 SOLUTION/ DROPS OPHTHALMIC; TOPICAL at 21:45

## 2024-01-31 RX ADMIN — Medication 50 MILLIGRAM(S): at 18:36

## 2024-01-31 RX ADMIN — Medication 1 MILLIGRAM(S): at 12:01

## 2024-01-31 RX ADMIN — HEPARIN SODIUM 5000 UNIT(S): 5000 INJECTION INTRAVENOUS; SUBCUTANEOUS at 16:24

## 2024-01-31 RX ADMIN — Medication 2 MILLIGRAM(S): at 17:03

## 2024-01-31 RX ADMIN — Medication 2 MILLIGRAM(S): at 07:09

## 2024-01-31 RX ADMIN — SODIUM CHLORIDE 1000 MILLILITER(S): 9 INJECTION INTRAMUSCULAR; INTRAVENOUS; SUBCUTANEOUS at 16:25

## 2024-01-31 RX ADMIN — HEPARIN SODIUM 5000 UNIT(S): 5000 INJECTION INTRAVENOUS; SUBCUTANEOUS at 07:14

## 2024-01-31 RX ADMIN — LATANOPROST 1 DROP(S): 0.05 SOLUTION/ DROPS OPHTHALMIC; TOPICAL at 12:00

## 2024-01-31 RX ADMIN — Medication 105 MILLIGRAM(S): at 16:24

## 2024-01-31 RX ADMIN — Medication 2 MILLIGRAM(S): at 07:14

## 2024-01-31 RX ADMIN — Medication 105 MILLIGRAM(S): at 08:58

## 2024-01-31 RX ADMIN — LEVETIRACETAM 750 MILLIGRAM(S): 250 TABLET, FILM COATED ORAL at 18:36

## 2024-01-31 RX ADMIN — HEPARIN SODIUM 5000 UNIT(S): 5000 INJECTION INTRAVENOUS; SUBCUTANEOUS at 21:45

## 2024-01-31 RX ADMIN — Medication 1 TABLET(S): at 12:00

## 2024-01-31 NOTE — PROVIDER CONTACT NOTE (CRITICAL VALUE NOTIFICATION) - SITUATION
RN notified COLLEEN AMADO of patient heart rate of 150 BPM.
RN notified COLLEEN AMADO of Heart rate 145 BPM on cardiac monitor

## 2024-01-31 NOTE — PATIENT PROFILE ADULT - FALL HARM RISK - HARM RISK INTERVENTIONS
Assistance with ambulation/Assistance OOB with selected safe patient handling equipment/Communicate Risk of Fall with Harm to all staff/Discuss with provider need for PT consult/Monitor for mental status changes/Monitor gait and stability/Provide patient with walking aids - walker, cane, crutches/Reinforce activity limits and safety measures with patient and family/Tailored Fall Risk Interventions/Toileting schedule using arm’s reach rule for commode and bathroom/Use of alarms - bed, chair and/or voice tab/Visual Cue: Yellow wristband and red socks/Bed in lowest position, wheels locked, appropriate side rails in place/Call bell, personal items and telephone in reach/Instruct patient to call for assistance before getting out of bed or chair/Non-slip footwear when patient is out of bed/[place] to call system/Physically safe environment - no spills, clutter or unnecessary equipment/Purposeful Proactive Rounding/Room/bathroom lighting operational, light cord in reach

## 2024-01-31 NOTE — ED ADULT NURSE REASSESSMENT NOTE - NS ED NURSE REASSESS COMMENT FT1
assumed care at 7:30am /pt is sleepy /no acute distress noted / safety maintained /pt on cardiac monitor/awaiting for bed .

## 2024-01-31 NOTE — PROGRESS NOTE ADULT - ASSESSMENT
43-year-old  male, w/ pmhx of  epilepsy on Keppra (questionable medication compliance), alcohol misuse (last drink night prior to presentation  1 pint of Vodka), hx ETOH withdrawal,  HTN, prostate CA. s/p radical resection in 2021, sent   from  MD office after having a breakthrough seizure that lasted about 30 sec   Also mother reported poor oral intake, abdominal pain and loose stools for few days    In ED   CT head no abnormal findings   electrolytes abnormalities K 2.8, Mg 1.3, Na 123  creatinine 3.13    CT abd pelvis Suspect bilateral pyelonephritis.  Pt waw admitted for breakthrough seizures, hypovolemic hyponatremia, hypokalemia, hypomagnesemia , OSCAR and pyelonephritis.   Started on Ceftriaxone and CIWA protocol   Pt seen at bedside, NAD, sleepy, opens eyes to voice.   Mother at bedside, updated on plan of care
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
## 2024-01-31 NOTE — CONSULT NOTE ADULT - ASSESSMENT
Mr Davies is a pleasant 43 year old gentleman with a history of prostate cancer, vit B12 and folate deficiency admitted for seizures.      1.  History of prostate cancer  ·	Patient follows with Dr. Matias from [facility].  ·	Patient is s/p RP in 2021  ·	Currently not on any active treatment  ·	Can follow as outpatient    2. Vit B12 and folate deficiency anemia  ·	Patient non compliant but recommended daily vit B12 and folate supplementation  ·	Will check another level this admission for his anemia  ·	Daily CBC  ·	keep hb>7  ·	Anemia  could be also secondary to GI bleed.  Patient known to drink alcohol, history of liver cirrhosis and splenomegaly.    ·	GI consult  ·	Monitor for stool OB.     Will cont to follow.   Mr Davies is a pleasant 43 year old gentleman with a history of prostate cancer, vit B12 and folate deficiency admitted for seizures.      1.  History of prostate cancer  ·	Patient follows with Dr. Matias from [facility].  ·	Patient is s/p RP in 2021  ·	Currently not on any active treatment  ·	Can follow as outpatient    2. Vit B12 and folate deficiency anemia  ·	Patient non compliant but recommended daily vit B12 and folate supplementation  ·	Will check another level this admission for his anemia.  ·	anemia workup for hemolysis, myeloma, vitamin iron deficiency sent.  ·	Daily CBC  ·	keep hb>7  ·	Anemia  could be also secondary to GI bleed.  Patient known to drink alcohol, history of liver cirrhosis and splenomegaly.    ·	GI consult  ·	Monitor for stool OB.     Will cont to follow.   Mr Davies is a pleasant 43 year old gentleman with a history of prostate cancer, vit B12 and folate deficiency admitted for seizures.      1.  History of prostate cancer  ·	Patient follows with Dr. Matias from [facility].  ·	Patient is s/p RP in 2021  ·	Currently not on any active treatment  ·	Can follow as outpatient    2. Vit B12 and folate deficiency anemia  ·	Patient non compliant but recommended daily vit B12 and folate supplementation  ·	Will check another level this admission for his anemia.  ·	anemia workup for hemolysis, myeloma, vitamin iron deficiency sent.  ·	Daily CBC  ·	keep hb>7  ·	Anemia  could be also secondary to GI bleed.  Patient known to drink alcohol daily and withdrawal syndrome  ·	GI consult  ·	Monitor for stool OB.     Will cont to follow.

## 2024-01-31 NOTE — PROVIDER CONTACT NOTE (CRITICAL VALUE NOTIFICATION) - ACTION/TREATMENT ORDERED:
As per NP Erna, STAT EKG needed.
As per NP Erna AMADO no need for EKG at this time, MD Rivera to come evaluate patient

## 2024-01-31 NOTE — PROVIDER CONTACT NOTE (CRITICAL VALUE NOTIFICATION) - ASSESSMENT
Patient resting in bed, calm, no acute distress noted, denies chest pain, no palpitations, no SOB.
Patient resting in bed, eating his lunch, no acute distress noted, denies chest pain, no SOB noted.

## 2024-01-31 NOTE — CONSULT NOTE ADULT - ASSESSMENT
[facility] NEPHROLOGY  Woody Sy M.D.  Richard Lane D.O.  Kasandra Mims M.D.  MD Carmen Raza, MSN, ANP-C    Telephone: [phone]  Facsimile: [phone]    [address] [place] [address], [address]  [place] [address]   Patient is a 44yo Male with HTN on ARB,  epilepsy on Keppra, alcohol use w/ ETOH withdrawal hx,  prostate CA. s/p radical resection in 2021, pancreatic cysts sent to ER from Dr. Kerr's office for an episode of being unresponsive. Pt a/w Seizure, pyelonephritis, diarrhea r/o CDiff and OSCAR with hyponatremia. Nephrology consulted for Elevated serum creatinine.    1. OSCAR  2. Hyponatremia  3. Metabolic acidosis  4. b/l Pyelonephritis      [facility] NEPHROLOGY  Woody Sy M.D.  Richard Lane D.O.  Kasandra Mims M.D.  MD Carmen Raza, MSN, ANP-C    Telephone: [phone]  Facsimile: [phone]    [facility] [place] [address], [address]  [place] [id number]   Patient is a 44yo Male with HTN on ARB,  epilepsy on Keppra, alcohol use w/ ETOH withdrawal hx,  prostate CA. s/p radical resection in 2021, pancreatic cysts sent to ER from Dr. Kerr's office for an episode of being unresponsive. Pt a/w Seizure, pyelonephritis, diarrhea r/o CDiff and OSCAR with hyponatremia. Nephrology consulted for Elevated serum creatinine.    1. OSCAR- unknown baseline SCr (prev SCr 0.4 in 2021). OSCAR likely pre-renal in the setting of diarrhea/ poor PO intake. Renal function improving with IVF; consistent with pre-renal OSCAR. +UA in th setting of infection. FeNa 0.9% and FeUrea 24%; consistent with pre-renal OSCAR. IVF as below.   CT abd/pelvis with b/l mod perinephric stranding and trace left hydro; no rt hydro. Strict I/Os. Avoid nephrotoxins/ NSAIDs/ RCA. Monitor BMP.    2. Hyponatremia- Urine Osm 186 and Urine Na 16 consistent with poor solute intake. However serum Osm 302; not consistent with hypotonic hyponatremia. Check SIFE and K/L and lipids in am. Serum Na overcorrected s/p NS 1.5L IV bolus for which IVF was switched to 1/2 NS. Check stat BMP.   Repeat urine sodium, urine osmolality and serum osmolality. TSH wnl. Check AM Cortisol. Monitor serial BMP's and UO for overcorrection. Do not correct more than 8 meQ/24 hours. Monitor serum sodium.    3. Metabolic acidosis- non AG MA in the setting of diarrhea with lactic acidosis. Lactate improving. VBG ph 7.29. If worsening serum CO2; will add sodium bicarb to IVF. Monitor ph/CO2.     4. b/l Pyelonephritis- +UA. Pt on Ceftriaxone. BCX and UCX pending. Cdiff neg.     5. Essential HTN- BP acceptable. Continue to hold ARB due to OSCAR. Monitor BP      [facility] NEPHROLOGY  Woody Sy M.D.  Richard Lane D.O.  Kasandra Mims M.D.  MD Carmen Raza, MSN, ANP-C    Telephone: [phone]  Facsimile: [phone] [place] [address], [address]  [place] [address]

## 2024-01-31 NOTE — CONSULT NOTE ADULT - SUBJECTIVE AND OBJECTIVE BOX
Reason for consult:    HPI:  43-year-old  male, leaves with his mother, ambulates w/ a cane with pmhx of  epilepsy on Keppra, alcohol misuse w/ ETOH withdrawal hx, HTN, prostate CA. s/p radical resection in 2021, currently undergoing OP w/u with GI Dr. Kerr for pancreatic cysts who was EMS to the ED from Dr. Kerr's office after he had an episode of being unresponsive. As per pt's sister Ms. Cannon who provided collateral information over the phone, the pt  slumped over to the side,  his whole body was stiff and then had generalized body shaking, he was rolling his eyes and was drooling, did not loose control of bladder or bowel. The episode lasted  for about 30 sec. As per pt's sister, he was a" little dazed", but then after about 5 min he was fairly back to baseline. Pt endorses last episode of seizure was about 2 months ago, he endorses good compliance to his medications, however, family members are not sure whether or not he is actually compliant. Pt sister informs that he was seen by OP neurologist 2 wks ago (Dr. De Santiago OP [facility]), who changed anti-seizure medication from BID to once a day.  Pt's sister also informs that pt drinks ETOH daily. However,  pt endorses he drinks on and off. As per pt, last drink was yesterday, drank 1 pint of Vodka.      At the time of my assessment pt informs that he feels weak and tired. Informs he has also been experiencing abdominal pain associated with 3-4 episodes of daily non-bloody loose stool that can be liquid at times for the past 2 wks. Has also had decreased PO intake, multiple episodes of Nausea and vomiting. Does not feel nauseous currently, denies HA, auditory, visual or tactile hallucinations.  Pt also endorses increased urinary frequency w/o dysuria. Pt and family deny recent hospitalizations or use of antibiotic       ED course  Tmax 98.2 HR 60-74 BP 97/62 O2SAT 100% on RA  Na 123, K 2.8, Mg 1.3  SCr 3.13 , Lact 3.2  UA + pyuria and bacteriuria  CTH diffuse brain parenchyma volume loss  CT A/P b/l perinephric stranding, diverticulosis w/o diverticulitis  s/p CTX 1 gr + 1 L NS + Mg +K replacement (30 Jan 2024 16:33)      PAST MEDICAL & SURGICAL HISTORY:  Seizure      Asthma      Malignant neoplasm of prostate      Hypertension      History of colon surgery      S/P hernia surgery      H/O laceration of skin          FAMILY HISTORY:  FH: epilepsy (Mother)        Alochol: Denied  Smoking: Nonsmoker  Drug Use: Denied  Marital Status:         Allergies    No Known Allergies    Intolerances        MEDICATIONS  (STANDING):  cefTRIAXone   IVPB 1000 milliGRAM(s) IV Intermittent every 24 hours  cholecalciferol 2000 Unit(s) Oral daily  cyanocobalamin 1000 MICROGram(s) Oral daily  folic acid 1 milliGRAM(s) Oral daily  heparin   Injectable 5000 Unit(s) SubCutaneous every 8 hours  latanoprost 0.005% Ophthalmic Solution 1 Drop(s) Both EYES at bedtime  levETIRAcetam 750 milliGRAM(s) Oral two times a day  LORazepam   Injectable 2 milliGRAM(s) IV Push every 4 hours  LORazepam   Injectable 1.5 milliGRAM(s) IV Push every 4 hours  LORazepam   Injectable   IV Push   multivitamin 1 Tablet(s) Oral daily  potassium chloride   Powder 40 milliEquivalent(s) Oral every 2 hours  sodium chloride 0.45%. 1000 milliLiter(s) (75 mL/Hr) IV Continuous <Continuous>  thiamine IVPB 500 milliGRAM(s) IV Intermittent every 8 hours    MEDICATIONS  (PRN):  acetaminophen     Tablet .. 650 milliGRAM(s) Oral every 6 hours PRN Temp greater or equal to 38C (100.4F), Mild Pain (1 - 3)  ondansetron Injectable 4 milliGRAM(s) IV Push every 8 hours PRN Nausea and/or Vomiting      ROS  14 point ROS negative except for above    T(C): 36.6 (01-31-24 @ 10:30), Max: 36.8 (01-30-24 @ 12:26)  HR: 101 (01-31-24 @ 10:30) (74 - 101)  BP: 110/71 (01-31-24 @ 10:30) (92/61 - 110/71)  RR: 20 (01-31-24 @ 10:30) (16 - 20)  SpO2: 99% (01-31-24 @ 10:30) (97% - 100%)  Wt(kg): --    PE  NAD  Awake, alert  Anicteric, MMM  RRR  CTAB  Abd soft, NT, ND  No c/c/e  No rash grossly  FROM                          8.8    7.67  )-----------( 468      ( 31 Jan 2024 05:05 )             26.3       01-31    132<L>  |  104  |  68<H>  ----------------------------<  104<H>  3.8   |  17<L>  |  2.43<H>    Ca    9.1      31 Jan 2024 05:05  Phos  2.9     01-31  Mg     1.4     01-31    TPro  7.2  /  Alb  3.5  /  TBili  0.7  /  DBili  x   /  AST  10  /  ALT  11  /  AlkPhos  58  01-31

## 2024-01-31 NOTE — CONSULT NOTE ADULT - SUBJECTIVE AND OBJECTIVE BOX
PATIENT SEEN AND EXAMINED; FULL NOTE TO FOLLOW [facility] NEPHROLOGY- CONSULTATION NOTE    Patient is a 42yo Male with HTN on ARB,  epilepsy on Keppra, alcohol use w/ ETOH withdrawal hx,  prostate CA. s/p radical resection in 2021, pancreatic cysts sent to ER from Dr. Kerr's office for an episode of being unresponsive. Pt a/w Seizure, pyelonephritis, diarrhea r/o CDiff and OSCAR with hyponatremia. Nephrology consulted for Elevated serum creatinine.    Pt is a poor historian and falling asleep mid conversation.   Pt denies any h/o kidney disease. Pt denies any SOB, chest pain, n/v abd pain, urinary complaints or LE edema. Pt c/o diarrhea 2-3 episodes per day for 2-3 days. Last ETOH use 3 days ago; 1 pint of vodka      PAST MEDICAL & SURGICAL HISTORY:  Seizure      Asthma      Malignant neoplasm of prostate      Hypertension      History of colon surgery      S/P hernia surgery      H/O laceration of skin        No Known Allergies    Home Medications Reviewed  Hospital Medications:   MEDICATIONS  (STANDING):  cefTRIAXone   IVPB 1000 milliGRAM(s) IV Intermittent every 24 hours  chlordiazePOXIDE 50 milliGRAM(s) Oral two times a day  cholecalciferol 2000 Unit(s) Oral daily  cyanocobalamin 1000 MICROGram(s) Oral daily  folic acid 1 milliGRAM(s) Oral daily  heparin   Injectable 5000 Unit(s) SubCutaneous every 8 hours  latanoprost 0.005% Ophthalmic Solution 1 Drop(s) Both EYES at bedtime  levETIRAcetam 750 milliGRAM(s) Oral two times a day  multivitamin 1 Tablet(s) Oral daily  potassium chloride   Powder 40 milliEquivalent(s) Oral every 2 hours  sodium chloride 0.45%. 1000 milliLiter(s) (75 mL/Hr) IV Continuous <Continuous>  thiamine 100 milliGRAM(s) Oral daily    SOCIAL HISTORY: +vodka use, ex smoker  Denies drug use  FAMILY HISTORY:  FH: epilepsy (Mother)        REVIEW OF SYSTEMS:  Gen: no changes in weight  HEENT: no rhinorrhea  Neck: no sore throat  Cards: no chest pain  Resp: no dyspnea  GI: no nausea or vomiting +diarrhea  : no dysuria or hematuria  Vascular: no LE edema  Derm: no rashes  Neuro: no numbness/tingling  All other review of systems is negative unless indicated above.    VITALS:  T(F): 98.4 (01-31-24 @ 15:38), Max: 98.4 (01-31-24 @ 15:38)  HR: 123 (01-31-24 @ 15:38)  BP: 120/72 (01-31-24 @ 15:38)  RR: 19 (01-31-24 @ 15:38)  SpO2: 100% (01-31-24 @ 15:38)  Wt(kg): --      PHYSICAL EXAM:  Gen: Drowsy  HEENT: anicteric  Neck: no JVD  Cards: RRR, +S1/S2, no M/G/R  Resp: CTA B/L  GI: soft, NT/ND, NABS  : no CVA tenderness  Extremities: no LE edema B/L  Derm: no rashes  Neuro: AO x2    LABS:  01-31    132<L>  |  104  |  68<H>  ----------------------------<  104<H>  3.8   |  17<L>  |  2.43<H>    Ca    9.1      31 Jan 2024 05:05  Phos  2.9     01-31  Mg     1.4     01-31    TPro  7.2  /  Alb  3.5  /  TBili  0.7  /  DBili      /  AST  10  /  ALT  11  /  AlkPhos  58  01-31    Creatinine Trend: 2.43 <--, 2.57 <--, 3.13 <--                        8.8    7.67  )-----------( 468      ( 31 Jan 2024 05:05 )             26.3     Urine Studies:  Urinalysis Basic - ( 31 Jan 2024 05:05 )    Color:  / Appearance:  / SG:  / pH:   Gluc: 104 mg/dL / Ketone:   / Bili:  / Urobili:    Blood:  / Protein:  / Nitrite:    Leuk Esterase:  / RBC:  / WBC    Sq Epi:  / Non Sq Epi:  / Bacteria:       Osmolality, Random Urine: 186 mos/kg (01-30 @ 19:00)  Sodium, Random Urine: 16 mmol/L (01-30 @ 19:00)  Creatinine, Random Urine: 36 mg/dL (01-30 @ 19:00)  Potassium, Random Urine: 2 mmol/L (01-30 @ 19:00)    RADIOLOGY & ADDITIONAL STUDIES:      < from: CT Abdomen and Pelvis No Cont (01.30.24 @ 13:47) >    ACC: [id number] EXAM:  CT ABDOMEN AND PELVIS   ORDERED BY: STEPHAN FORREST     PROCEDURE DATE:  01/30/2024        < end of copied text >  < from: CT Abdomen and Pelvis No Cont (01.30.24 @ 13:47) >  KIDNEYS/URETERS: Moderate bilateral perinephric fat stranding concerning   for pyelonephritis. Trace left hydronephrosis with urothelial thickening.   No right hydronephrosis.    < end of copied text >  < from: CT Abdomen and Pelvis No Cont (01.30.24 @ 13:47) >  IMPRESSION:  Suspect bilateral pyelonephritis.    < end of copied text >

## 2024-02-01 LAB
ANION GAP SERPL CALC-SCNC: 8 MMOL/L — SIGNIFICANT CHANGE UP (ref 5–17)
BUN SERPL-MCNC: 64 MG/DL — HIGH (ref 7–18)
CALCIUM SERPL-MCNC: 9.3 MG/DL — SIGNIFICANT CHANGE UP (ref 8.4–10.5)
CHLORIDE SERPL-SCNC: 108 MMOL/L — SIGNIFICANT CHANGE UP (ref 96–108)
CO2 SERPL-SCNC: 20 MMOL/L — LOW (ref 22–31)
CORTIS AM PEAK SERPL-MCNC: 7.8 UG/DL — SIGNIFICANT CHANGE UP (ref 6–18.4)
CREAT SERPL-MCNC: 2.18 MG/DL — HIGH (ref 0.5–1.3)
CULTURE RESULTS: SIGNIFICANT CHANGE UP
EGFR: 37 ML/MIN/1.73M2 — LOW
FERRITIN SERPL-MCNC: 528 NG/ML — HIGH (ref 30–400)
FOLATE SERPL-MCNC: 4.9 NG/ML — SIGNIFICANT CHANGE UP
GLUCOSE SERPL-MCNC: 116 MG/DL — HIGH (ref 70–99)
HAPTOGLOB SERPL-MCNC: 188 MG/DL — SIGNIFICANT CHANGE UP (ref 34–200)
HCT VFR BLD CALC: 25.4 % — LOW (ref 39–50)
HGB BLD-MCNC: 8.3 G/DL — LOW (ref 13–17)
IGA FLD-MCNC: 318 MG/DL — SIGNIFICANT CHANGE UP (ref 84–499)
IGG FLD-MCNC: 939 MG/DL — SIGNIFICANT CHANGE UP (ref 610–1660)
IGM SERPL-MCNC: 83 MG/DL — SIGNIFICANT CHANGE UP (ref 35–242)
IRON SATN MFR SERPL: 91 UG/DL — SIGNIFICANT CHANGE UP (ref 65–170)
KAPPA LC SER QL IFE: 5.56 MG/DL — HIGH (ref 0.33–1.94)
KAPPA/LAMBDA FREE LIGHT CHAIN RATIO, SERUM: 1.33 RATIO — SIGNIFICANT CHANGE UP (ref 0.26–1.65)
LAMBDA LC SER QL IFE: 4.19 MG/DL — HIGH (ref 0.57–2.63)
LDH SERPL L TO P-CCNC: 198 U/L — SIGNIFICANT CHANGE UP (ref 120–225)
MAGNESIUM SERPL-MCNC: 1.4 MG/DL — LOW (ref 1.6–2.6)
MCHC RBC-ENTMCNC: 29 PG — SIGNIFICANT CHANGE UP (ref 27–34)
MCHC RBC-ENTMCNC: 32.7 GM/DL — SIGNIFICANT CHANGE UP (ref 32–36)
MCV RBC AUTO: 88.8 FL — SIGNIFICANT CHANGE UP (ref 80–100)
NRBC # BLD: 0 /100 WBCS — SIGNIFICANT CHANGE UP (ref 0–0)
OSMOLALITY SERPL: 298 MOSMOL/KG — SIGNIFICANT CHANGE UP (ref 275–300)
OSMOLALITY UR: 301 MOS/KG — SIGNIFICANT CHANGE UP (ref 50–1200)
PHOSPHATE SERPL-MCNC: 2.5 MG/DL — SIGNIFICANT CHANGE UP (ref 2.5–4.5)
PLATELET # BLD AUTO: 415 K/UL — HIGH (ref 150–400)
POTASSIUM SERPL-MCNC: 3.7 MMOL/L — SIGNIFICANT CHANGE UP (ref 3.5–5.3)
POTASSIUM SERPL-SCNC: 3.7 MMOL/L — SIGNIFICANT CHANGE UP (ref 3.5–5.3)
PROT SERPL-MCNC: 5.8 G/DL — LOW (ref 6–8.3)
RBC # BLD: 2.86 M/UL — LOW (ref 4.2–5.8)
RBC # FLD: 15.8 % — HIGH (ref 10.3–14.5)
SODIUM SERPL-SCNC: 136 MMOL/L — SIGNIFICANT CHANGE UP (ref 135–145)
SODIUM UR-SCNC: 33 MMOL/L — SIGNIFICANT CHANGE UP
SPECIMEN SOURCE: SIGNIFICANT CHANGE UP
TRANSFERRIN SERPL-MCNC: 170 MG/DL — LOW (ref 200–360)
TRIGL SERPL-MCNC: 41 MG/DL — SIGNIFICANT CHANGE UP
VIT B12 SERPL-MCNC: 1636 PG/ML — HIGH (ref 232–1245)
WBC # BLD: 8.54 K/UL — SIGNIFICANT CHANGE UP (ref 3.8–10.5)
WBC # FLD AUTO: 8.54 K/UL — SIGNIFICANT CHANGE UP (ref 3.8–10.5)

## 2024-02-01 PROCEDURE — 99233 SBSQ HOSP IP/OBS HIGH 50: CPT | Mod: GC

## 2024-02-01 RX ORDER — SODIUM CHLORIDE 9 MG/ML
1000 INJECTION, SOLUTION INTRAVENOUS
Refills: 0 | Status: DISCONTINUED | OUTPATIENT
Start: 2024-02-01 | End: 2024-02-02

## 2024-02-01 RX ORDER — MAGNESIUM SULFATE 500 MG/ML
2 VIAL (ML) INJECTION ONCE
Refills: 0 | Status: COMPLETED | OUTPATIENT
Start: 2024-02-01 | End: 2024-02-01

## 2024-02-01 RX ADMIN — SODIUM CHLORIDE 55 MILLILITER(S): 9 INJECTION, SOLUTION INTRAVENOUS at 14:47

## 2024-02-01 RX ADMIN — Medication 25 GRAM(S): at 12:01

## 2024-02-01 RX ADMIN — LATANOPROST 1 DROP(S): 0.05 SOLUTION/ DROPS OPHTHALMIC; TOPICAL at 21:51

## 2024-02-01 RX ADMIN — CEFTRIAXONE 100 MILLIGRAM(S): 500 INJECTION, POWDER, FOR SOLUTION INTRAMUSCULAR; INTRAVENOUS at 14:44

## 2024-02-01 RX ADMIN — HEPARIN SODIUM 5000 UNIT(S): 5000 INJECTION INTRAVENOUS; SUBCUTANEOUS at 14:44

## 2024-02-01 RX ADMIN — Medication 100 MILLIGRAM(S): at 12:01

## 2024-02-01 RX ADMIN — Medication 50 MILLIGRAM(S): at 05:11

## 2024-02-01 RX ADMIN — HEPARIN SODIUM 5000 UNIT(S): 5000 INJECTION INTRAVENOUS; SUBCUTANEOUS at 05:11

## 2024-02-01 RX ADMIN — Medication 1 TABLET(S): at 12:02

## 2024-02-01 RX ADMIN — PREGABALIN 1000 MICROGRAM(S): 225 CAPSULE ORAL at 12:01

## 2024-02-01 RX ADMIN — LEVETIRACETAM 750 MILLIGRAM(S): 250 TABLET, FILM COATED ORAL at 17:06

## 2024-02-01 RX ADMIN — Medication 1 MILLIGRAM(S): at 12:02

## 2024-02-01 RX ADMIN — Medication 2000 UNIT(S): at 12:01

## 2024-02-01 RX ADMIN — HEPARIN SODIUM 5000 UNIT(S): 5000 INJECTION INTRAVENOUS; SUBCUTANEOUS at 21:51

## 2024-02-01 RX ADMIN — Medication 25 MILLIGRAM(S): at 17:06

## 2024-02-01 RX ADMIN — LEVETIRACETAM 750 MILLIGRAM(S): 250 TABLET, FILM COATED ORAL at 05:11

## 2024-02-01 NOTE — PROGRESS NOTE ADULT - PROBLEM SELECTOR PLAN 5
BUN/Cr >20  creatinine 3.13   pre-renal in the s/o poor PO intake and ongoing GI losses  IV fluids   creatinine trending down 2.43
likely hypovolemic hyponatremia in the s/o poor PO intake and GI losses  s/p 1 L NS  Na improved from 123 - 127-132  s/p IVF 0.45 NS @ 75cc/hr; c/w NS 55cc/hr   Nephro following   > f/u SIFE, Kappa/lambda

## 2024-02-01 NOTE — PROGRESS NOTE ADULT - PROBLEM SELECTOR PLAN 8
subacute diarrhea   low fiber diet  GI PCR neg, C diff neg
subacute diarrhea   low fiber diet  GI PCR neg

## 2024-02-01 NOTE — PROGRESS NOTE ADULT - ASSESSMENT
Mr Davies is a pleasant 43 year old gentleman with a history of prostate cancer, vit B12 and folate deficiency admitted for seizures.      1.  History of prostate cancer  ·	Patient follows with Dr. Matias from [facility].  ·	Patient is s/p RP in 2021  ·	Currently not on any active treatment  ·	Can follow as outpatient    2. Vit B12 and folate deficiency anemia  ·	Patient non compliant but recommended daily vit B12 and folate supplementation  ·	Folic acid level low normal.    ·	Please start folic acid 1mg po daily.   ·	Otherwise anemia workup for negative for hemolysis or vitamin B12 and iron deficiency.  Myeloma labs pending.  ·	Daily CBC  ·	keep hb>7  ·	Anemia  could be also secondary to GI bleed.  Patient known to drink alcohol daily and withdrawal syndrome  ·	GI consult  ·	Monitor for stool OB.     Will cont to follow.
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
## 2024-02-01 NOTE — PROGRESS NOTE ADULT - PROBLEM SELECTOR PLAN 3
BUN/Cr >20  creatinine 3.13   pre-renal in the s/o poor PO intake and ongoing GI losses, FeNa 0.8%  c/w IVF 55cc/hr   creatinine trending down 2.43 > 2.18
likely hypovolemic hyponatremia in the s/o poor PO intake and GI losses  s/p 1 L NS  Na improved from 123 - 127-132  continue IVF 0.45 NS @ 75cc/hr

## 2024-02-01 NOTE — PROGRESS NOTE ADULT - PROBLEM SELECTOR PLAN 1
breakthrough seizures  Multifactorial in the s/o  questionable medication compliance,  ETOH intake as well as hypomagnesemia and hyponatremia which can lower seizure threshold  fall and seizure precautions  resumed home dose of keppra, pt on Keppra 750 XL QD, will resume Keppra 750 mg PO BID  replace electrolytes   keep Mg >2  f/u keppra levels  Dr. Escobedo neurology consulted
breakthrough seizures  Multifactorial in the s/o  questionable medication compliance,  ETOH intake as well as hypomagnesemia and hyponatremia which can lower seizure threshold  fall and seizure precautions  resumed home dose of keppra, pt on Keppra 750 XL QD, will resume Keppra 750 mg PO BID  replace electrolytes   keep Mg >2  f/u keppra levels  Dr. Escobedo neurology consulted

## 2024-02-01 NOTE — PROGRESS NOTE ADULT - PROBLEM SELECTOR PLAN 7
normocytic anemia  baseline ~ 10-11  on admission 9.2 >8.3  no evidence of active bleeding  monitor H/H daily, active type and screen  B12 1636, Folate 4.9   Hem/Onc following
s/p CTX 1 gr  on prior UCX ( 2021) pt grew Serratia pansensitive  continue with ceftriaxone   f/u BCX and UCX

## 2024-02-01 NOTE — PROGRESS NOTE ADULT - PROBLEM SELECTOR PLAN 4
s/p CTX 1 gr  on prior UCX ( 2021) pt grew Serratia pansensitive  c/w ceftriaxone  CT abd pelvis Suspect bilateral pyelonephritis.  UA + ; UCx: gram + organism   BCx: NGTD 24h

## 2024-02-01 NOTE — PROGRESS NOTE ADULT - PROBLEM SELECTOR PLAN 6
likely due to  poor PO intake and GI losses  repleted, K 3.8  monitor BMP  RESOLVED
normocytic anemia  baseline ~ 10-11  on admission 9.2  no evidence of active bleeding  monitor H/H daily  active type and screen

## 2024-02-01 NOTE — PROGRESS NOTE ADULT - ASSESSMENT
Cardiology Patient is a 42yo Male with HTN on ARB,  epilepsy on Keppra, alcohol use w/ ETOH withdrawal hx,  prostate CA. s/p radical resection in 2021, pancreatic cysts sent to ER from Dr. Kerr's office for an episode of being unresponsive. Pt a/w Seizure, pyelonephritis, diarrhea r/o CDiff and OSCAR with hyponatremia. Nephrology consulted for Elevated serum creatinine.    1. OSCAR- unknown baseline SCr (prev SCr 0.4 in 2021). OSCAR likely pre-renal in the setting of diarrhea/ poor PO intake. Renal function improving with IVF; consistent with pre-renal OSCAR. +UA in th setting of infection. FeNa 0.9% and FeUrea 24%; consistent with pre-renal OSCAR. Recc LR @ 55ml/hr  CT abd/pelvis with b/l mod perinephric stranding and trace left hydro; no rt hydro. Strict I/Os. Avoid nephrotoxins/ NSAIDs/ RCA. Monitor BMP.    2. Hyponatremia- resolved.  Urine Osm 186 and Urine Na 16 consistent with poor solute intake. However serum Osm normal x2; not true hyponatremia. Check SIFE and K/L in am. Lipids wnl.   Serum Na overcorrected s/p NS 1.5L IV bolus for which IVF was switched to 1/2 NS. Pt now off IVF. Hyponatremia resolved. Serum Na acceptable (goal ~137-139 this afternoon)  TSH wnl and AM Cortisol wnl. Monitor serum sodium.    3. Metabolic acidosis- non AG MA in the setting of diarrhea with lactic acidosis. Lactate improving. VBG ph 7.29. Serum CO2 improving; as per pt denies further diarrhea. Monitor ph/CO2.     4. b/l Pyelonephritis- +UA. Pt on Ceftriaxone. BCX 1/30- NG, UCX- Gram positive organism Cdiff neg.     5. Essential HTN- BP acceptable. Continue to hold ARB due to OSCAR. Monitor BP      [facility]  Woody Sy M.D.  Richard Lane D.O.  Kasandra Mims M.D.  MD Carmen Raza, MSN, ANP-C    Telephone: [phone]  Facsimile: [phone] [place] [address], [address]  [place] [address]   Patient is a 42yo Male with HTN on ARB,  epilepsy on Keppra, alcohol use w/ ETOH withdrawal hx,  prostate CA. s/p radical resection in 2021, pancreatic cysts sent to ER from Dr. Kerr's office for an episode of being unresponsive. Pt a/w Seizure, pyelonephritis, diarrhea r/o CDiff and OSCAR with hyponatremia. Nephrology consulted for Elevated serum creatinine.    1. OSCAR- unknown baseline SCr (prev SCr 0.4 in 2021). OSCAR likely pre-renal in the setting of diarrhea/ poor PO intake. Renal function improving with IVF; consistent with pre-renal OSCAR. +UA in th setting of infection. FeNa 0.9% and FeUrea 24%; consistent with pre-renal OSCAR. Recc LR @ 55ml/hr  CT abd/pelvis with b/l mod perinephric stranding and trace left hydro; no rt hydro. Strict I/Os. Avoid nephrotoxins/ NSAIDs/ RCA. Monitor BMP.    2. Hyponatremia- resolved.  Urine Osm 186 and Urine Na 16 consistent with poor solute intake. However serum Osm normal x2; not true hyponatremia. Check SIFE and K/L in am. Lipids wnl.   Serum Na overcorrected s/p NS 1.5L IV bolus for which IVF was switched to 1/2 NS. Pt now off IVF (recc IVF as above). Hyponatremia resolved. Serum Na acceptable (goal ~137-139 this afternoon)  TSH wnl and AM Cortisol wnl. Monitor serum sodium.    3. Metabolic acidosis- non AG MA in the setting of diarrhea with lactic acidosis. Lactate improving. VBG ph 7.29. Serum CO2 improving; as per pt denies further diarrhea. Monitor ph/CO2.     4. b/l Pyelonephritis- +UA. Pt on Ceftriaxone. BCX 1/30- NG, UCX- Gram positive organism Cdiff neg.     5. Essential HTN- BP acceptable. Continue to hold ARB due to OSCAR. Monitor BP      [facility]  Woody Sy M.D.  Richard Lane D.O.  Kasandra Mims M.D.  MD Carmen Raza, MSN, ANP-C    Telephone: [phone]  Facsimile: [phone] [place] [address], [address]  [place] [address]

## 2024-02-01 NOTE — PROGRESS NOTE ADULT - PROBLEM SELECTOR PLAN 2
CIWA protocol   MV, FA and IV thiamine  TID for 3 days then continue PO daily  SW consulted
CIWA protocol   MV, FA and IV thiamine  TID for 3 days then continue PO daily  Librium decreased to 25mg BID   SW consulted

## 2024-02-01 NOTE — PROGRESS NOTE ADULT - ASSESSMENT
43-year-old  male, w/ pmhx of  epilepsy on Keppra (questionable medication compliance), alcohol misuse (last drink night prior to presentation 1 pint of Vodka), hx ETOH withdrawal,  HTN, prostate CA. s/p radical resection in 2021, sent from MD office after having a breakthrough seizure that lasted about 30 sec. Also mother reported poor oral intake, abdominal pain and loose stools for few days    In ED   CT head no abnormal findings   electrolytes abnormalities K 2.8, Mg 1.3, Na 123  creatinine 3.13      Pt waw admitted for breakthrough seizures, hypovolemic hyponatremia, hypokalemia, hypomagnesemia , OSCAR and pyelonephritis.   Started on Ceftriaxone and CIWA protocol   Pt seen at bedside, NAD, sleepy, opens eyes to voice.   Mother at bedside, updated on plan of care

## 2024-02-02 ENCOUNTER — TRANSCRIPTION ENCOUNTER (OUTPATIENT)
Age: 44
End: 2024-02-02

## 2024-02-02 VITALS
TEMPERATURE: 97 F | DIASTOLIC BLOOD PRESSURE: 86 MMHG | HEART RATE: 95 BPM | OXYGEN SATURATION: 100 % | SYSTOLIC BLOOD PRESSURE: 125 MMHG | RESPIRATION RATE: 18 BRPM

## 2024-02-02 LAB
ANION GAP SERPL CALC-SCNC: 8 MMOL/L — SIGNIFICANT CHANGE UP (ref 5–17)
BASOPHILS # BLD AUTO: 0.05 K/UL — SIGNIFICANT CHANGE UP (ref 0–0.2)
BASOPHILS # BLD AUTO: 0.07 K/UL — SIGNIFICANT CHANGE UP (ref 0–0.2)
BASOPHILS NFR BLD AUTO: 0.7 % — SIGNIFICANT CHANGE UP (ref 0–2)
BASOPHILS NFR BLD AUTO: 0.9 % — SIGNIFICANT CHANGE UP (ref 0–2)
BUN SERPL-MCNC: 45 MG/DL — HIGH (ref 7–18)
CALCIUM SERPL-MCNC: 9.1 MG/DL — SIGNIFICANT CHANGE UP (ref 8.4–10.5)
CHLORIDE SERPL-SCNC: 108 MMOL/L — SIGNIFICANT CHANGE UP (ref 96–108)
CO2 SERPL-SCNC: 21 MMOL/L — LOW (ref 22–31)
CREAT SERPL-MCNC: 1.55 MG/DL — HIGH (ref 0.5–1.3)
EGFR: 56 ML/MIN/1.73M2 — LOW
EOSINOPHIL # BLD AUTO: 0.11 K/UL — SIGNIFICANT CHANGE UP (ref 0–0.5)
EOSINOPHIL # BLD AUTO: 0.11 K/UL — SIGNIFICANT CHANGE UP (ref 0–0.5)
EOSINOPHIL NFR BLD AUTO: 1.4 % — SIGNIFICANT CHANGE UP (ref 0–6)
EOSINOPHIL NFR BLD AUTO: 1.6 % — SIGNIFICANT CHANGE UP (ref 0–6)
EPO SERPL-MCNC: 16.3 MIU/ML — SIGNIFICANT CHANGE UP (ref 2.6–18.5)
GLUCOSE SERPL-MCNC: 99 MG/DL — SIGNIFICANT CHANGE UP (ref 70–99)
HCT VFR BLD CALC: 22.9 % — LOW (ref 39–50)
HCT VFR BLD CALC: 24.2 % — LOW (ref 39–50)
HGB BLD-MCNC: 7.3 G/DL — LOW (ref 13–17)
HGB BLD-MCNC: 7.8 G/DL — LOW (ref 13–17)
IMM GRANULOCYTES NFR BLD AUTO: 0.5 % — SIGNIFICANT CHANGE UP (ref 0–0.9)
IMM GRANULOCYTES NFR BLD AUTO: 0.7 % — SIGNIFICANT CHANGE UP (ref 0–0.9)
KAPPA LC SER QL IFE: 4.87 MG/DL — HIGH (ref 0.33–1.94)
KAPPA/LAMBDA FREE LIGHT CHAIN RATIO, SERUM: 1.3 RATIO — SIGNIFICANT CHANGE UP (ref 0.26–1.65)
LAMBDA LC SER QL IFE: 3.75 MG/DL — HIGH (ref 0.57–2.63)
LEVETIRACETAM SERPL-MCNC: 60.7 UG/ML — HIGH (ref 10–40)
LYMPHOCYTES # BLD AUTO: 1.17 K/UL — SIGNIFICANT CHANGE UP (ref 1–3.3)
LYMPHOCYTES # BLD AUTO: 1.19 K/UL — SIGNIFICANT CHANGE UP (ref 1–3.3)
LYMPHOCYTES # BLD AUTO: 15 % — SIGNIFICANT CHANGE UP (ref 13–44)
LYMPHOCYTES # BLD AUTO: 17.6 % — SIGNIFICANT CHANGE UP (ref 13–44)
MAGNESIUM SERPL-MCNC: 1.5 MG/DL — LOW (ref 1.6–2.6)
MCHC RBC-ENTMCNC: 28.6 PG — SIGNIFICANT CHANGE UP (ref 27–34)
MCHC RBC-ENTMCNC: 29.1 PG — SIGNIFICANT CHANGE UP (ref 27–34)
MCHC RBC-ENTMCNC: 31.9 GM/DL — LOW (ref 32–36)
MCHC RBC-ENTMCNC: 32.2 GM/DL — SIGNIFICANT CHANGE UP (ref 32–36)
MCV RBC AUTO: 89.8 FL — SIGNIFICANT CHANGE UP (ref 80–100)
MCV RBC AUTO: 90.3 FL — SIGNIFICANT CHANGE UP (ref 80–100)
MONOCYTES # BLD AUTO: 0.98 K/UL — HIGH (ref 0–0.9)
MONOCYTES # BLD AUTO: 1.05 K/UL — HIGH (ref 0–0.9)
MONOCYTES NFR BLD AUTO: 13.5 % — SIGNIFICANT CHANGE UP (ref 2–14)
MONOCYTES NFR BLD AUTO: 14.5 % — HIGH (ref 2–14)
NEUTROPHILS # BLD AUTO: 4.4 K/UL — SIGNIFICANT CHANGE UP (ref 1.8–7.4)
NEUTROPHILS # BLD AUTO: 5.35 K/UL — SIGNIFICANT CHANGE UP (ref 1.8–7.4)
NEUTROPHILS NFR BLD AUTO: 64.9 % — SIGNIFICANT CHANGE UP (ref 43–77)
NEUTROPHILS NFR BLD AUTO: 68.7 % — SIGNIFICANT CHANGE UP (ref 43–77)
NRBC # BLD: 0 /100 WBCS — SIGNIFICANT CHANGE UP (ref 0–0)
NRBC # BLD: 0 /100 WBCS — SIGNIFICANT CHANGE UP (ref 0–0)
PHOSPHATE SERPL-MCNC: 2.2 MG/DL — LOW (ref 2.5–4.5)
PLATELET # BLD AUTO: 278 K/UL — SIGNIFICANT CHANGE UP (ref 150–400)
PLATELET # BLD AUTO: 351 K/UL — SIGNIFICANT CHANGE UP (ref 150–400)
POTASSIUM SERPL-MCNC: 3.8 MMOL/L — SIGNIFICANT CHANGE UP (ref 3.5–5.3)
POTASSIUM SERPL-SCNC: 3.8 MMOL/L — SIGNIFICANT CHANGE UP (ref 3.5–5.3)
RBC # BLD: 2.55 M/UL — LOW (ref 4.2–5.8)
RBC # BLD: 2.68 M/UL — LOW (ref 4.2–5.8)
RBC # FLD: 15.9 % — HIGH (ref 10.3–14.5)
RBC # FLD: 16.3 % — HIGH (ref 10.3–14.5)
SODIUM SERPL-SCNC: 137 MMOL/L — SIGNIFICANT CHANGE UP (ref 135–145)
WBC # BLD: 6.78 K/UL — SIGNIFICANT CHANGE UP (ref 3.8–10.5)
WBC # BLD: 7.79 K/UL — SIGNIFICANT CHANGE UP (ref 3.8–10.5)
WBC # FLD AUTO: 6.78 K/UL — SIGNIFICANT CHANGE UP (ref 3.8–10.5)
WBC # FLD AUTO: 7.79 K/UL — SIGNIFICANT CHANGE UP (ref 3.8–10.5)

## 2024-02-02 PROCEDURE — 83605 ASSAY OF LACTIC ACID: CPT

## 2024-02-02 PROCEDURE — 80307 DRUG TEST PRSMV CHEM ANLYZR: CPT

## 2024-02-02 PROCEDURE — 83010 ASSAY OF HAPTOGLOBIN QUANT: CPT

## 2024-02-02 PROCEDURE — 80053 COMPREHEN METABOLIC PANEL: CPT

## 2024-02-02 PROCEDURE — 74176 CT ABD & PELVIS W/O CONTRAST: CPT | Mod: MA

## 2024-02-02 PROCEDURE — 87637 SARSCOV2&INF A&B&RSV AMP PRB: CPT

## 2024-02-02 PROCEDURE — 83735 ASSAY OF MAGNESIUM: CPT

## 2024-02-02 PROCEDURE — 84132 ASSAY OF SERUM POTASSIUM: CPT

## 2024-02-02 PROCEDURE — 82140 ASSAY OF AMMONIA: CPT

## 2024-02-02 PROCEDURE — 81001 URINALYSIS AUTO W/SCOPE: CPT

## 2024-02-02 PROCEDURE — 82728 ASSAY OF FERRITIN: CPT

## 2024-02-02 PROCEDURE — 83540 ASSAY OF IRON: CPT

## 2024-02-02 PROCEDURE — 83521 IG LIGHT CHAINS FREE EACH: CPT

## 2024-02-02 PROCEDURE — 86334 IMMUNOFIX E-PHORESIS SERUM: CPT

## 2024-02-02 PROCEDURE — 86850 RBC ANTIBODY SCREEN: CPT

## 2024-02-02 PROCEDURE — 84300 ASSAY OF URINE SODIUM: CPT

## 2024-02-02 PROCEDURE — 84100 ASSAY OF PHOSPHORUS: CPT

## 2024-02-02 PROCEDURE — 82570 ASSAY OF URINE CREATININE: CPT

## 2024-02-02 PROCEDURE — 84466 ASSAY OF TRANSFERRIN: CPT

## 2024-02-02 PROCEDURE — 71045 X-RAY EXAM CHEST 1 VIEW: CPT

## 2024-02-02 PROCEDURE — 82668 ASSAY OF ERYTHROPOIETIN: CPT

## 2024-02-02 PROCEDURE — 84146 ASSAY OF PROLACTIN: CPT

## 2024-02-02 PROCEDURE — 82962 GLUCOSE BLOOD TEST: CPT

## 2024-02-02 PROCEDURE — 82550 ASSAY OF CK (CPK): CPT

## 2024-02-02 PROCEDURE — 87493 C DIFF AMPLIFIED PROBE: CPT

## 2024-02-02 PROCEDURE — 93005 ELECTROCARDIOGRAM TRACING: CPT

## 2024-02-02 PROCEDURE — 85025 COMPLETE CBC W/AUTO DIFF WBC: CPT

## 2024-02-02 PROCEDURE — 84165 PROTEIN E-PHORESIS SERUM: CPT

## 2024-02-02 PROCEDURE — 87045 FECES CULTURE AEROBIC BACT: CPT

## 2024-02-02 PROCEDURE — 83690 ASSAY OF LIPASE: CPT

## 2024-02-02 PROCEDURE — 87086 URINE CULTURE/COLONY COUNT: CPT

## 2024-02-02 PROCEDURE — 83615 LACTATE (LD) (LDH) ENZYME: CPT

## 2024-02-02 PROCEDURE — 87077 CULTURE AEROBIC IDENTIFY: CPT

## 2024-02-02 PROCEDURE — 36415 COLL VENOUS BLD VENIPUNCTURE: CPT

## 2024-02-02 PROCEDURE — 84484 ASSAY OF TROPONIN QUANT: CPT

## 2024-02-02 PROCEDURE — 84155 ASSAY OF PROTEIN SERUM: CPT

## 2024-02-02 PROCEDURE — 87186 SC STD MICRODIL/AGAR DIL: CPT

## 2024-02-02 PROCEDURE — 84295 ASSAY OF SERUM SODIUM: CPT

## 2024-02-02 PROCEDURE — 70450 CT HEAD/BRAIN W/O DYE: CPT | Mod: MA

## 2024-02-02 PROCEDURE — 96374 THER/PROPH/DIAG INJ IV PUSH: CPT

## 2024-02-02 PROCEDURE — 99285 EMERGENCY DEPT VISIT HI MDM: CPT | Mod: 25

## 2024-02-02 PROCEDURE — 83993 ASSAY FOR CALPROTECTIN FECAL: CPT

## 2024-02-02 PROCEDURE — 84133 ASSAY OF URINE POTASSIUM: CPT

## 2024-02-02 PROCEDURE — 84443 ASSAY THYROID STIM HORMONE: CPT

## 2024-02-02 PROCEDURE — 82746 ASSAY OF FOLIC ACID SERUM: CPT

## 2024-02-02 PROCEDURE — 85027 COMPLETE CBC AUTOMATED: CPT

## 2024-02-02 PROCEDURE — 80048 BASIC METABOLIC PNL TOTAL CA: CPT

## 2024-02-02 PROCEDURE — 82330 ASSAY OF CALCIUM: CPT

## 2024-02-02 PROCEDURE — 86901 BLOOD TYPING SEROLOGIC RH(D): CPT

## 2024-02-02 PROCEDURE — 82607 VITAMIN B-12: CPT

## 2024-02-02 PROCEDURE — 96375 TX/PRO/DX INJ NEW DRUG ADDON: CPT

## 2024-02-02 PROCEDURE — 82533 TOTAL CORTISOL: CPT

## 2024-02-02 PROCEDURE — 80177 DRUG SCRN QUAN LEVETIRACETAM: CPT

## 2024-02-02 PROCEDURE — 83935 ASSAY OF URINE OSMOLALITY: CPT

## 2024-02-02 PROCEDURE — 84478 ASSAY OF TRIGLYCERIDES: CPT

## 2024-02-02 PROCEDURE — 80061 LIPID PANEL: CPT

## 2024-02-02 PROCEDURE — 84156 ASSAY OF PROTEIN URINE: CPT

## 2024-02-02 PROCEDURE — 83930 ASSAY OF BLOOD OSMOLALITY: CPT

## 2024-02-02 PROCEDURE — 83880 ASSAY OF NATRIURETIC PEPTIDE: CPT

## 2024-02-02 PROCEDURE — 83631 LACTOFERRIN FECAL (QUANT): CPT

## 2024-02-02 PROCEDURE — 84540 ASSAY OF URINE/UREA-N: CPT

## 2024-02-02 PROCEDURE — 87040 BLOOD CULTURE FOR BACTERIA: CPT

## 2024-02-02 PROCEDURE — 86900 BLOOD TYPING SEROLOGIC ABO: CPT

## 2024-02-02 PROCEDURE — 82803 BLOOD GASES ANY COMBINATION: CPT

## 2024-02-02 PROCEDURE — 87507 IADNA-DNA/RNA PROBE TQ 12-25: CPT

## 2024-02-02 PROCEDURE — 87046 STOOL CULTR AEROBIC BACT EA: CPT

## 2024-02-02 PROCEDURE — 82784 ASSAY IGA/IGD/IGG/IGM EACH: CPT

## 2024-02-02 RX ORDER — AMPICILLIN TRIHYDRATE 250 MG
500 CAPSULE ORAL EVERY 6 HOURS
Refills: 0 | Status: DISCONTINUED | OUTPATIENT
Start: 2024-02-03 | End: 2024-02-02

## 2024-02-02 RX ORDER — MAGNESIUM SULFATE 500 MG/ML
2 VIAL (ML) INJECTION ONCE
Refills: 0 | Status: COMPLETED | OUTPATIENT
Start: 2024-02-02 | End: 2024-02-02

## 2024-02-02 RX ORDER — AMOXICILLIN 250 MG/5ML
1 SUSPENSION, RECONSTITUTED, ORAL (ML) ORAL
Qty: 20 | Refills: 0
Start: 2024-02-02 | End: 2024-02-11

## 2024-02-02 RX ORDER — AMPICILLIN TRIHYDRATE 250 MG
500 CAPSULE ORAL ONCE
Refills: 0 | Status: COMPLETED | OUTPATIENT
Start: 2024-02-02 | End: 2024-02-02

## 2024-02-02 RX ORDER — LEVETIRACETAM 250 MG/1
1 TABLET, FILM COATED ORAL
Qty: 0 | Refills: 0 | DISCHARGE

## 2024-02-02 RX ORDER — SODIUM,POTASSIUM PHOSPHATES 278-250MG
1 POWDER IN PACKET (EA) ORAL ONCE
Refills: 0 | Status: COMPLETED | OUTPATIENT
Start: 2024-02-02 | End: 2024-02-02

## 2024-02-02 RX ORDER — AMPICILLIN TRIHYDRATE 250 MG
CAPSULE ORAL
Refills: 0 | Status: DISCONTINUED | OUTPATIENT
Start: 2024-02-02 | End: 2024-02-02

## 2024-02-02 RX ADMIN — HEPARIN SODIUM 5000 UNIT(S): 5000 INJECTION INTRAVENOUS; SUBCUTANEOUS at 15:21

## 2024-02-02 RX ADMIN — PREGABALIN 1000 MICROGRAM(S): 225 CAPSULE ORAL at 11:52

## 2024-02-02 RX ADMIN — LEVETIRACETAM 750 MILLIGRAM(S): 250 TABLET, FILM COATED ORAL at 17:23

## 2024-02-02 RX ADMIN — Medication 1 MILLIGRAM(S): at 11:53

## 2024-02-02 RX ADMIN — Medication 25 GRAM(S): at 09:46

## 2024-02-02 RX ADMIN — Medication 100 MILLIGRAM(S): at 11:52

## 2024-02-02 RX ADMIN — HEPARIN SODIUM 5000 UNIT(S): 5000 INJECTION INTRAVENOUS; SUBCUTANEOUS at 06:27

## 2024-02-02 RX ADMIN — Medication 104 MILLIGRAM(S): at 15:59

## 2024-02-02 RX ADMIN — Medication 2000 UNIT(S): at 11:52

## 2024-02-02 RX ADMIN — LEVETIRACETAM 750 MILLIGRAM(S): 250 TABLET, FILM COATED ORAL at 06:27

## 2024-02-02 RX ADMIN — Medication 1 PACKET(S): at 09:46

## 2024-02-02 RX ADMIN — Medication 25 MILLIGRAM(S): at 06:26

## 2024-02-02 RX ADMIN — Medication 1 TABLET(S): at 11:52

## 2024-02-02 NOTE — PROGRESS NOTE ADULT - ASSESSMENT
Patient is a 44yo Male with HTN on ARB,  epilepsy on Keppra, alcohol use w/ ETOH withdrawal hx,  prostate CA. s/p radical resection in 2021, pancreatic cysts sent to ER from Dr. Kerr's office for an episode of being unresponsive. Pt a/w Seizure, pyelonephritis, diarrhea r/o CDiff and OSCAR with hyponatremia. Nephrology consulted for Elevated serum creatinine.    1. OSCAR- unknown baseline SCr (prev SCr 0.4 in 2021). OSCAR likely pre-renal in the setting of diarrhea/ poor PO intake. Renal function improving with IVF; consistent with pre-renal OSCAR. +UA in th setting of infection. FeNa 0.9% and FeUrea 24%; consistent with pre-renal OSCAR. c/w LR @ 55ml/hr  CT abd/pelvis with b/l mod perinephric stranding and trace left hydro; no rt hydro. Strict I/Os. Avoid nephrotoxins/ NSAIDs/ RCA. Monitor BMP.    2. Hyponatremia- resolved.  Urine Osm 186 and Urine Na 16 consistent with poor solute intake. However serum Osm normal x2; not true hyponatremia. f/u SIFE however K/L wnl;  Lipids wnl   Serum Na overcorrected s/p NS 1.5L IV bolus for which IVF was switched to 1/2 NS. Hyponatremia resolved. Serum Na acceptable  TSH wnl and AM Cortisol wnl. Monitor serum sodium.    3. Metabolic acidosis- non AG MA in the setting of diarrhea with lactic acidosis. Lactate improving. VBG ph 7.29. Serum CO2 improving; as per pt denies further diarrhea. Monitor ph/CO2.     4. b/l Pyelonephritis- +UA. Pt on Ceftriaxone. UCx EFaecalis,  BCx NG. Cdiff neg.     5. Essential HTN- BP acceptable. Continue to hold ARB due to OSCAR. Monitor BP      [facility]  Woody Sy M.D.  Richard Lane D.O.  Kasandra Mims M.D.  MD Carmen Raza, MSN, ANP-C    Telephone: [phone]  Facsimile: [phone]    [address] [place] [address], #[facility]  [place] [address]   Patient is a 42yo Male with HTN on ARB,  epilepsy on Keppra, alcohol use w/ ETOH withdrawal hx,  prostate CA. s/p radical resection in 2021, pancreatic cysts sent to ER from Dr. Kerr's office for an episode of being unresponsive. Pt a/w Seizure, pyelonephritis, diarrhea r/o CDiff and OSCAR with hyponatremia. Nephrology consulted for Elevated serum creatinine.    1. OSCAR- unknown baseline SCr (prev SCr 0.4 in 2021). OSCAR likely pre-renal in the setting of diarrhea/ poor PO intake. Renal function improving with IVF; consistent with pre-renal OSCAR. +UA in th setting of infection. FeNa 0.9% and FeUrea 24%; consistent with pre-renal OSCAR. c/w LR @ 55ml/hr  CT abd/pelvis with b/l mod perinephric stranding and trace left hydro; no rt hydro. Strict I/Os. Avoid nephrotoxins/ NSAIDs/ RCA. Monitor BMP.    2. Hyponatremia- resolved.  Urine Osm 186 and Urine Na 16 consistent with poor solute intake. However serum Osm normal x2; not true hyponatremia. f/u SIFE however K/L wnl;  Lipids wnl   Serum Na overcorrected s/p NS 1.5L IV bolus for which IVF was switched to 1/2 NS. Hyponatremia resolved. Serum Na acceptable  TSH wnl and AM Cortisol wnl. Monitor serum sodium.  HypoMg and Hypophos- Pt given Kphos PO and Mg 2g IV x1. Monitor Phos/ Mg    3. Metabolic acidosis- non AG MA in the setting of diarrhea with lactic acidosis. Lactate improving. VBG ph 7.29. Serum CO2 improving; as per pt denies further diarrhea. Monitor ph/CO2.     4. b/l Pyelonephritis- +UA. Pt on Ceftriaxone. UCx EFaecalis,  BCx NG. Cdiff neg.     5. Essential HTN- BP acceptable. Continue to hold ARB due to OSCAR. Monitor BP      [facility]PHR[facility]  Woody Sy M.D.  Richard Lane D.O.  Kasandra Mims M.D.  MD Carmen Raza, MSN, ANP-C    Telephone: [phone]  Facsimile: [phone]    [facility] [place] [address], [address]  [place] [id number]

## 2024-02-02 NOTE — DISCHARGE NOTE PROVIDER - NSDCMRMEDTOKEN_GEN_ALL_CORE_FT
cyanocobalamin 1000 mcg oral tablet: 1 tab(s) orally once a day  Keppra  mg oral tablet, extended release: 1 tab(s) orally 2 times a day  latanoprost 0.005% ophthalmic solution: 1 drop(s) in each eye once a day  Toprol- mg oral tablet, extended release: 1 tab(s) orally once a day  valsartan 40 mg oral tablet: 1 tab(s) orally once a day  Ventolin HFA CFC free 90 mcg/inh inhalation aerosol: 2 puff(s) inhaled every 6 hours, As Needed  Vitamin D3 25 mcg (1000 intl units) oral tablet: 2 tab(s) orally once a day   amoxicillin 875 mg oral tablet: 1 tab(s) orally 2 times a day  cyanocobalamin 1000 mcg oral tablet: 1 tab(s) orally once a day  Keppra  mg oral tablet, extended release: 1 tab(s) orally 2 times a day  latanoprost 0.005% ophthalmic solution: 1 drop(s) in each eye once a day  Toprol- mg oral tablet, extended release: 1 tab(s) orally once a day  valsartan 40 mg oral tablet: 1 tab(s) orally once a day  Ventolin HFA CFC free 90 mcg/inh inhalation aerosol: 2 puff(s) inhaled every 6 hours, As Needed  Vitamin D3 25 mcg (1000 intl units) oral tablet: 2 tab(s) orally once a day

## 2024-02-02 NOTE — DISCHARGE NOTE PROVIDER - HOSPITAL COURSE
HPI:  43-year-old  male, leaves with his mother, ambulates w/ a cane with pmhx of  epilepsy on Keppra, alcohol misuse w/ ETOH withdrawal hx, HTN, prostate CA. s/p radical resection in 2021, currently undergoing OP w/u with GI Dr. Kerr for pancreatic cysts who was EMS to the ED from Dr. Kerr's office after he had an episode of being unresponsive. As per pt's sister Ms. Cannon who provided collateral information over the phone, the pt  slumped over to the side,  his whole body was stiff and then had generalized body shaking, he was rolling his eyes and was drooling, did not loose control of bladder or bowel. The episode lasted  for about 30 sec. As per pt's sister, he was a" little dazed", but then after about 5 min he was fairly back to baseline. Pt endorses last episode of seizure was about 2 months ago, he endorses good compliance to his medications, however, family members are not sure whether or not he is actually compliant. Pt sister informs that he was seen by OP neurologist 2 wks ago (Dr. De Santiago OP Neuro), who changed anti-seizure medication from BID to once a day.  Pt's sister also informs that pt drinks ETOH daily. However,  pt endorses he drinks on and off. As per pt, last drink was yesterday, drank 1 pint of Vodka.   At the time of my assessment pt informs that he feels weak and tired. Informs he has also been experiencing abdominal pain associated with 3-4 episodes of daily non-bloody loose stool that can be liquid at times for the past 2 wks. Has also had decreased PO intake, multiple episodes of Nausea and vomiting. Does not feel nauseous currently, denies HA, auditory, visual or tactile hallucinations.  Pt also endorses increased urinary frequency w/o dysuria. Pt and family deny recent hospitalizations or use of antibiotic.  Pt was seen for seizures, suspecting Multifactorial in the s/o  questionable medication compliance,  ETOH intake as well as hypomagnesemia and hyponatremia which can lower seizure threshold. Resumed home dose of keppra, pt on Keppra 750 XL QD, will resume Keppra 750 mg PO BID. Keppra levels noted to be 60. For alcohol abuse history pt was started on CIWA protocol with librium esvin with no signs of withdrawl. Pt was seen by  who provided support and options for cessation. On admission pt was noted to have OSCAR which significantly improved. Pt advised to follow up with PCP on discharge. Given patient's improved clinical status and current hemodynamic stability, decision was made to discharge after discussing with attending physician. Please refer to patient's complete medical chart with documents for a full hospital course, for this is only a brief summary.   HPI:  43-year-old  male, leaves with his mother, ambulates w/ a cane with pmhx of  epilepsy on Keppra, alcohol misuse w/ ETOH withdrawal hx, HTN, prostate CA. s/p radical resection in 2021, currently undergoing OP w/u with GI Dr. Kerr for pancreatic cysts who was EMS to the ED from Dr. Kerr's office after he had an episode of being unresponsive. As per pt's sister Ms. Cannon who provided collateral information over the phone, the pt  slumped over to the side,  his whole body was stiff and then had generalized body shaking, he was rolling his eyes and was drooling, did not loose control of bladder or bowel. The episode lasted  for about 30 sec. As per pt's sister, he was a" little dazed", but then after about 5 min he was fairly back to baseline. Pt endorses last episode of seizure was about 2 months ago, he endorses good compliance to his medications, however, family members are not sure whether or not he is actually compliant. Pt sister informs that he was seen by OP neurologist 2 wks ago (Dr. De Santiago OP Neuro), who changed anti-seizure medication from BID to once a day.  Pt's sister also informs that pt drinks ETOH daily. However,  pt endorses he drinks on and off. As per pt, last drink was yesterday, drank 1 pint of Vodka.   At the time of my assessment pt informs that he feels weak and tired. Informs he has also been experiencing abdominal pain associated with 3-4 episodes of daily non-bloody loose stool that can be liquid at times for the past 2 wks. Has also had decreased PO intake, multiple episodes of Nausea and vomiting. Does not feel nauseous currently, denies HA, auditory, visual or tactile hallucinations.  Pt also endorses increased urinary frequency w/o dysuria. Pt and family deny recent hospitalizations or use of antibiotic.  Pt was seen for seizures, suspecting Multifactorial in the s/o  questionable medication compliance,  ETOH intake as well as hypomagnesemia and hyponatremia which can lower seizure threshold. Resumed home dose of keppra, pt on Keppra 750 XL QD, will resume Keppra 750 mg PO BID. Keppra levels noted to be 60. For alcohol abuse history pt was started on CIWA protocol with librium esvin with no signs of withdrawal. Pt was seen by  who provided support and options for cessation. On admission pt was noted to have OSCAR which significantly improved. Pt noted to have enterococcus fecalis on the urine culture will start pt on amoxicillin for 10 days. Pt advised to follow up with PCP on discharge. Given patient's improved clinical status and current hemodynamic stability, decision was made to discharge after discussing with attending physician. Please refer to patient's complete medical chart with documents for a full hospital course, for this is only a brief summary.

## 2024-02-02 NOTE — DISCHARGE NOTE NURSING/CASE MANAGEMENT/SOCIAL WORK - NSDPDISTO_GEN_ALL_CORE
HPI:   Pete Calixto is a 35year old female here to f/u on rectal pain and constipation and new c/o skin changes    Pt reports her constipation was better but it has returned as she has resumed old habits    Pt has been constipated since her 18's.   Pt c Home dysuria, vaginal discharge or itching  MUSCULOSKELETAL: denies back pain  NEURO: denies headaches  PSYCHE: denies depression or anxiety  HEMATOLOGIC: denies hx of anemia  ENDOCRINE: denies thyroid history  ALL/ASTHMA: denies hx of allergy or asthma    EXAM

## 2024-02-02 NOTE — PROGRESS NOTE ADULT - ASSESSMENT
Mr Davies is a pleasant 43 year old gentleman with a history of prostate cancer, vit B12 and folate deficiency admitted for seizures.      1.  History of prostate cancer  ·	Patient follows with Dr. Matias from [facility].  ·	Patient is s/p RP in 2021  ·	Currently not on any active treatment  ·	Can follow as outpatient    2. Vit B12 and folate deficiency anemia  ·	Patient non compliant but recommended daily vit B12 and folate supplementation  ·	folic acid level low normal.    ·	folic acid 1mg po daily started this admission  ·	Otherwise anemia workup for negative for hemolysis or vitamin B12 and iron deficiency.  Myeloma labs pending.  ·	Daily CBC  ·	keep hb>7  ·	Anemia  could be also secondary to GI bleed.  Patient known to drink alcohol daily and withdrawal syndrome  ·	GI consult if hgb cont to fall, patient denies bleeding      Will cont to follow.  d/w pt, family,, and primary team

## 2024-02-02 NOTE — PHYSICAL EXAM
[Alert] : alert [Normal Voice/Communication] : normal voice/communication [Underweight (BMI <= 18.5)] : underweight (BMI <= 18.5) [Sclera] : the sclera and conjunctiva were normal [Hearing Threshold Finger Rub Not [place]] : hearing was normal [Normal Lips/Gums] : the lips and gums were normal [Oropharynx] : the oropharynx was normal [Normal Appearance] : the appearance of the neck was normal [No Neck Mass] : no neck mass was observed [No Respiratory Distress] : no respiratory distress [No Acc Muscle Use] : no accessory muscle use [Respiration, Rhythm And Depth] : normal respiratory rhythm and effort [Auscultation Breath Sounds / Voice Sounds] : lungs were clear to auscultation bilaterally [Heart Rate And Rhythm] : heart rate was normal and rhythm regular [Normal S1, S2] : normal S1 and S2 [Murmurs] : no murmurs [Bowel Sounds] : normal bowel sounds [Abdomen Tenderness] : non-tender [No Masses] : no abdominal mass palpated [Abdomen Soft] : soft [] : no hepatosplenomegaly [Oriented To Time, Place, And Person] : oriented to person, place, and time [de-identified] : malnourished

## 2024-02-02 NOTE — DISCHARGE NOTE PROVIDER - ATTENDING DISCHARGE PHYSICAL EXAMINATION:
GENERAL: NAD, sitting in bed comfortably  HEAD:  Atraumatic, Normocephalic  EYES: EOMI, PERRLA, conjunctiva and sclera clear  ENMT: No tonsillar erythema, exudates, or enlargement; Moist mucous membranes.   NECK: Supple, No JVD  CHEST/LUNG: Clear to percussion bilaterally; No resp distress; No rales, rhonchi, wheezing, or rubs  HEART: Regular rate and rhythm; No murmurs, rubs, or gallops  ABDOMEN: Soft, Nontender, Nondistended; Bowel sounds present  : no dysuria, no gross hematuria,   EXTREMITIES: No clubbing, cyanosis, or edema  SKIN: No rashes or lesions  NERVOUS SYSTEM:  Alert & Oriented X3, Good concentration; Motor Strength 5/5 B/L upper and lower extremities

## 2024-02-02 NOTE — DISCHARGE NOTE PROVIDER - CARE PROVIDER_API CALL
Kasandra Mims  Nephrology  [address] [place] [address], [address]  [place] [address]  Phone: [phone]  Fax: [phone]  Follow Up Time:

## 2024-02-02 NOTE — ASSESSMENT
[FreeTextEntry1] : This is a 43M here for an evaluation of pancreas cyst. Referred by Dr. Matias. PMH of prostate cancer, bone cancer, HTN, iron overload, seizure last episode 5-7 months ago, ETOH abuse. Denies a family history of colon CA, gastric CA, high risk polyps, Inflammatory and autoimmune disease. Patient is following Dr Matias for bone and prostate cancer. Had an MRI done and found to have 1.3cm pancreas head cyst. Patient denies any N&V, Has vague non radiating RUQ discomfort. No jaundice. No prior pancreatitis. No blood or dark tarry stools. Down 34 pounds in 6 months. Colonoscopy from 7 months ago normal. Of note, during assessment patient had 30 second of lost on consciousness. Pulse was present. He was confused for 5 seconds and stated he was at a hospital. Patient returned to baseline after 15 seconds. At that time with SBP 70s. O2 Sat 94% RR 11. EMT was called. Patient with ETOH odor.  Smoke/Etoh: 20 pack years. Drinker on and off half pint daily since he was 7 years. Last drink last night.   My plan LOC during asessment. Likely ETOH use Vs seizure Vs other. SBP during event 70s. Called EMT and sent to [facility] ED EUS with [facility] once medically cleared  Will need medical clearance during another office consultation  Bring list of medication

## 2024-02-02 NOTE — HISTORY OF PRESENT ILLNESS
[FreeTextEntry1] : This is a 43M here for an evaluation of pancreas cyst. Referred by Dr. Matias. PMH of prostate cancer, bone cancer, HTN, iron overload, seizure last episode 5-7 months ago, ETOH abuse. Denies a family history of colon CA, gastric CA, high risk polyps, Inflammatory and autoimmune disease. Patient is following Dr Matias for bone and prostate cancer. Had an MRI done and found to have 1.3cm pancreas head cyst. Patient denies any N&V, Has vague non radiating RUQ discomfort. No jaundice. No prior pancreatitis. No blood or dark tarry stools. Down 34 pounds in 6 months. Colonoscopy from 7 months ago normal. Of note, during assessment patient had 30 second of lost on consciousness. Pulse was present. He was confused for 5 seconds and stated he was at a hospital. Patient returned to baseline after 15 seconds. At that time with SBP 70s. O2 Sat 94% RR 11. EMT was called. Patient with ETOH odor.  Smoke/Etoh: 20 pack years. Drinker on and off half pint daily since he was 7 years. Last drink last night.

## 2024-02-02 NOTE — DISCHARGE NOTE PROVIDER - NSDCCPCAREPLAN_GEN_ALL_CORE_FT
PRINCIPAL DISCHARGE DIAGNOSIS  Diagnosis: Seizures  Assessment and Plan of Treatment: You came to the hospital with history of seizures and recently had your Keppra dosage changed. We think the change in dosage along with your alcohol use contributed to your seizure. We had you evaluated by the neurologist who recomended that you take Keppra 750 2x a day. Please follow up with your primary care doctor and neurlogist within 2 weeks of discharge.      SECONDARY DISCHARGE DIAGNOSES  Diagnosis: Alcohol abuse  Assessment and Plan of Treatment: You have a history of alcohol abuse. This is dangerous for your health. You met with the  who gave you options for quitting. We recomend that you stop drinking alochol as it is causing you significant harm. Please follow up with your primary care physician in one week to inform them of your recent hospitalization and further management of your medical conditions.      Diagnosis: OSCAR (acute kidney injury)  Assessment and Plan of Treatment: Pt with OSCAR in the setting of XXX. Exact duration of OSCAR however unknown. Send UA, urine electrolytes, spot urine TP/CR. Send serological work-up for secondary causes of renal failure including BAIRON, P-ANCA, C-ANCA, Anti-GBM ab, HBsAg, Hep C antibody, HIV, Parvovirus, C3, C4, serum and urine immunofixation. Recommend White catheter placement. Agree with IVF NS for now.  Will need to consider HD if renal failure continues to worsen. Monitor labs and urine output. Avoid NSAIDs, ACEI/ARBS, RCA and nephrotoxins. Dose medications as per eGFR.      Diagnosis: Hypokalemia  Assessment and Plan of Treatment: Your potassium was low, which was probably cuased by your drinking. We gave you potassium and it became normal. Please follow up with your primary care physician in one week to inform them of your recent hospitalization and further management of your medical conditions.      Diagnosis: Hyponatremia  Assessment and Plan of Treatment: Your Sodium was low, which was probably cuased by your drinking. We gave you Sodium and it became normal. Please follow up with your primary care physician in one week to inform them of your recent hospitalization and further management of your medical conditions.     PRINCIPAL DISCHARGE DIAGNOSIS  Diagnosis: Seizures  Assessment and Plan of Treatment: You came to the hospital with history of seizures and recently had your Keppra dosage changed. We think the change in dosage along with your alcohol use contributed to your seizure. We had you evaluated by the neurologist who recomended that you take Keppra 750 2x a day. Please follow up with your primary care doctor and neurlogist within 2 weeks of discharge.      SECONDARY DISCHARGE DIAGNOSES  Diagnosis: Alcohol abuse  Assessment and Plan of Treatment: You have a history of alcohol abuse. This is dangerous for your health. You met with the  who gave you options for quitting. We recomend that you stop drinking alochol as it is causing you significant harm. Please follow up with your primary care physician in one week to inform them of your recent hospitalization and further management of your medical conditions.      Diagnosis: OSCAR (acute kidney injury)  Assessment and Plan of Treatment: Pt with OSCAR in the setting of XXX. Exact duration of OSCAR however unknown. Send UA, urine electrolytes, spot urine TP/CR. Send serological work-up for secondary causes of renal failure including BAIRON, P-ANCA, C-ANCA, Anti-GBM ab, HBsAg, Hep C antibody, HIV, Parvovirus, C3, C4, serum and urine immunofixation. Recommend White catheter placement. Agree with IVF NS for now.  Will need to consider HD if renal failure continues to worsen. Monitor labs and urine output. Avoid NSAIDs, ACEI/ARBS, RCA and nephrotoxins. Dose medications as per eGFR.      Diagnosis: Hypokalemia  Assessment and Plan of Treatment: Your potassium was low, which was probably cuased by your drinking. We gave you potassium and it became normal. Please follow up with your primary care physician in one week to inform them of your recent hospitalization and further management of your medical conditions.      Diagnosis: Hyponatremia  Assessment and Plan of Treatment: Your Sodium was low, which was probably cuased by your drinking. We gave you Sodium and it became normal. Please follow up with your primary care physician in one week to inform them of your recent hospitalization and further management of your medical conditions.    Diagnosis: Acute UTI  Assessment and Plan of Treatment: You were noted to have an infection in the urine. Treated you with Amoxicillin, please take it for 10 days, 850mg 2x a day. Please follow up with your primary care physician in one week to inform them of your recent hospitalization and further management of your medical conditions.       PRINCIPAL DISCHARGE DIAGNOSIS  Diagnosis: Seizures  Assessment and Plan of Treatment: You came to the hospital with history of seizures and recently had your Keppra dosage changed. We think the change in dosage along with your alcohol use contributed to your seizure. We had you evaluated by the neurologist who recomended that you take Keppra 750 2x a day. Please follow up with your primary care doctor and neurlogist within 2 weeks of discharge.      SECONDARY DISCHARGE DIAGNOSES  Diagnosis: OSCAR (acute kidney injury)  Assessment and Plan of Treatment: Pt with OSCAR in the setting of XXX. Exact duration of OSCAR however unknown. Send UA, urine electrolytes, spot urine TP/CR. Send serological work-up for secondary causes of renal failure including BAIRON, P-ANCA, C-ANCA, Anti-GBM ab, HBsAg, Hep C antibody, HIV, Parvovirus, C3, C4, serum and urine immunofixation. Recommend White catheter placement. Agree with IVF NS for now.  Will need to consider HD if renal failure continues to worsen. Monitor labs and urine output. Avoid NSAIDs, ACEI/ARBS, RCA and nephrotoxins. Dose medications as per eGFR.      Diagnosis: Hyponatremia  Assessment and Plan of Treatment: Your Sodium was low, which was probably cuased by your drinking. We gave you Sodium and it became normal. Please follow up with your primary care physician in one week to inform them of your recent hospitalization and further management of your medical conditions.    Diagnosis: UTI (urinary tract infection) due to Enterococcus  Assessment and Plan of Treatment: You were noted to have an infection in the urine. Treated you with Amoxicillin, please take it for 10 days, 850mg 2x a day. Please follow up with your primary care physician in one week to inform them of your recent hospitalization and further management of your medical conditions.    Diagnosis: Hypokalemia  Assessment and Plan of Treatment: Your potassium was low, which was probably cuased by your drinking. We gave you potassium and it became normal. Please follow up with your primary care physician in one week to inform them of your recent hospitalization and further management of your medical conditions.      Diagnosis: Alcohol abuse  Assessment and Plan of Treatment: You have a history of alcohol abuse. This is dangerous for your health. You met with the  who gave you options for quitting. We recomend that you stop drinking alochol as it is causing you significant harm. Please follow up with your primary care physician in one week to inform them of your recent hospitalization and further management of your medical conditions.      Diagnosis: Acute UTI  Assessment and Plan of Treatment: You were noted to have an infection in the urine. Treated you with Amoxicillin, please take it for 10 days, 850mg 2x a day. Please follow up with your primary care physician in one week to inform them of your recent hospitalization and further management of your medical conditions.

## 2024-02-02 NOTE — DISCHARGE NOTE NURSING/CASE MANAGEMENT/SOCIAL WORK - PATIENT PORTAL LINK FT
I ordered the Heplisav-B and Shingrix and sent to [facility] pharmacy.     You can access the FollowMyHealth Patient Portal offered by [facility] by registering at the following website: [email or web address]. By joining [facility]’s FollowMyHealth portal, you will also be able to view your health information using other applications (apps) compatible with our system.

## 2024-02-02 NOTE — PROGRESS NOTE ADULT - SUBJECTIVE AND OBJECTIVE BOX
patient seen, no bleeding, no symptoms of anemia, no new complaints today.    MEDICATIONS  (STANDING):  ampicillin  IVPB      chlordiazePOXIDE 25 milliGRAM(s) Oral every 12 hours  cholecalciferol 2000 Unit(s) Oral daily  cyanocobalamin 1000 MICROGram(s) Oral daily  folic acid 1 milliGRAM(s) Oral daily  heparin   Injectable 5000 Unit(s) SubCutaneous every 8 hours  lactated ringers. 1000 milliLiter(s) (55 mL/Hr) IV Continuous <Continuous>  latanoprost 0.005% Ophthalmic Solution 1 Drop(s) Both EYES at bedtime  levETIRAcetam 750 milliGRAM(s) Oral two times a day  multivitamin 1 Tablet(s) Oral daily  potassium chloride   Powder 40 milliEquivalent(s) Oral every 2 hours  thiamine 100 milliGRAM(s) Oral daily    MEDICATIONS  (PRN):  acetaminophen     Tablet .. 650 milliGRAM(s) Oral every 6 hours PRN Temp greater or equal to 38C (100.4F), Mild Pain (1 - 3)  LORazepam   Injectable 2 milliGRAM(s) IV Push every 4 hours PRN Symptom-triggered: each CIWA -Ar score 8 or GREATER  ondansetron Injectable 4 milliGRAM(s) IV Push every 8 hours PRN Nausea and/or Vomiting      ROS    Limited due to participation, see above, no chest pain, no shortness of breath.    Vital Signs Last 24 Hrs  T(C): 36.2 (02 Feb 2024 11:14), Max: 37 (02 Feb 2024 04:50)  T(F): 97.2 (02 Feb 2024 11:14), Max: 98.6 (02 Feb 2024 04:50)  HR: 95 (02 Feb 2024 11:14) (85 - 110)  BP: 125/86 (02 Feb 2024 11:14) (103/79 - 125/86)  BP(mean): --  RR: 18 (02 Feb 2024 11:14) (17 - 18)  SpO2: 100% (02 Feb 2024 11:14) (100% - 100%)    Parameters below as of 02 Feb 2024 11:14  Patient On (Oxygen Delivery Method): room air        PE  NAD  Awake, alert    Full exam deferred today by patient                          7.8    7.79  )-----------( 351      ( 02 Feb 2024 12:20 )             24.2       02-02    137  |  108  |  45<H>  ----------------------------<  99  3.8   |  21<L>  |  1.55<H>    Ca    9.1      02 Feb 2024 07:20  Phos  2.2     02-02  Mg     1.5     02-02    TPro  5.8<L>  /  Alb  x   /  TBili  x   /  DBili  x   /  AST  x   /  ALT  x   /  AlkPhos  x   02-01      
Chief complaint: Patient is a 43y old  Male who presents with a chief complaint of seizure (31 Jan 2024 16:31)      KATIA MCCLELLAN is a 43y year old Male     INTERVAL HPI/OVERNIGHT EVENTS: No acute events overnight.  Patient examined at bedside this AM.  Patient denies acute complaints. Patient had no BM today.      REVIEW OF SYSTEMS:  CONSTITUTIONAL: No fever, weight loss, or fatigue  EYES: No eye pain, visual disturbances, or discharge  ENMT:  No difficulty hearing, tinnitus, vertigo; No sinus or throat pain  NECK: No pain or stiffness  RESPIRATORY: No cough, wheezing, chills or hemoptysis; No shortness of breath  CARDIOVASCULAR: No chest pain, palpitations, dizziness, or leg swelling  GASTROINTESTINAL: diarrhea resolving; No abdominal or epigastric pain. No nausea, vomiting, or hematemesis; No constipation. No melena or hematochezia.  GENITOURINARY: No dysuria, frequency, hematuria, or incontinence  NEUROLOGICAL: No headaches, memory loss, loss of strength, numbness, or tremors  MUSCULOSKELETAL: No joint pain or swelling; No muscle, back, or extremity pain  HEME/LYMPH: No easy bruising, or bleeding gums      FAMILY HISTORY:  FH: epilepsy (Mother)        T(C): 36.9 (02-01-24 @ 11:03), Max: 36.9 (01-31-24 @ 15:38)  HR: 96 (02-01-24 @ 11:03) (90 - 145)  BP: 111/70 (02-01-24 @ 11:03) (111/70 - 133/83)  RR: 18 (02-01-24 @ 11:03) (17 - 19)  SpO2: 98% (02-01-24 @ 11:03) (97% - 100%)  Wt(kg): --Vital Signs Last 24 Hrs  T(C): 36.9 (01 Feb 2024 11:03), Max: 36.9 (31 Jan 2024 15:38)  T(F): 98.5 (01 Feb 2024 11:03), Max: 98.5 (01 Feb 2024 11:03)  HR: 96 (01 Feb 2024 11:03) (90 - 145)  BP: 111/70 (01 Feb 2024 11:03) (111/70 - 133/83)  BP(mean): 91 (31 Jan 2024 22:15) (85 - 91)  RR: 18 (01 Feb 2024 11:03) (17 - 19)  SpO2: 98% (01 Feb 2024 11:03) (97% - 100%)    Parameters below as of 01 Feb 2024 11:03  Patient On (Oxygen Delivery Method): room air        PHYSICAL EXAM:  GENERAL: NAD, well-groomed, well-developed  HEAD:  Atraumatic, Normocephalic  EYES: EOMI, PERRLA, conjunctiva and sclera clear  ENMT: No tonsillar erythema, exudates, or enlargement; Moist mucous membranes.   NECK: Supple, No JVD  NERVOUS SYSTEM:  Alert & Oriented X3, Good concentration; Motor Strength 5/5 B/L upper and lower extremities  CHEST/LUNG: Clear to percussion bilaterally; No resp distress; No rales, rhonchi, wheezing, or rubs  HEART: Regular rate and rhythm; No murmurs, rubs, or gallops  ABDOMEN: Soft, Nontender, Nondistended; Bowel sounds present  : no dysuria, no gross hematuria, White (+/-)  EXTREMITIES: No clubbing, cyanosis, or edema  SKIN: No rashes or lesions          LABS:                        8.3    8.54  )-----------( 415      ( 01 Feb 2024 05:43 )             25.4       02-01    136  |  108  |  64<H>  ----------------------------<  116<H>  3.7   |  20<L>  |  2.18<H>    Ca    9.3      01 Feb 2024 05:43  Phos  2.5     02-01  Mg     1.4     02-01    TPro  7.2  /  Alb  3.5  /  TBili  0.7  /  DBili  x   /  AST  10  /  ALT  11  /  AlkPhos  58  01-31        RADIOLOGY & ADDITIONAL TESTS:    Imaging Personally Reviewed:  [ ] YES  [ ] NO  acetaminophen     Tablet .. 650 milliGRAM(s) Oral every 6 hours PRN  cefTRIAXone   IVPB 1000 milliGRAM(s) IV Intermittent every 24 hours  chlordiazePOXIDE 25 milliGRAM(s) Oral every 12 hours  cholecalciferol 2000 Unit(s) Oral daily  cyanocobalamin 1000 MICROGram(s) Oral daily  folic acid 1 milliGRAM(s) Oral daily  heparin   Injectable 5000 Unit(s) SubCutaneous every 8 hours  latanoprost 0.005% Ophthalmic Solution 1 Drop(s) Both EYES at bedtime  levETIRAcetam 750 milliGRAM(s) Oral two times a day  LORazepam   Injectable 2 milliGRAM(s) IV Push every 4 hours PRN  magnesium sulfate  IVPB 2 Gram(s) IV Intermittent once  multivitamin 1 Tablet(s) Oral daily  ondansetron Injectable 4 milliGRAM(s) IV Push every 8 hours PRN  potassium chloride   Powder 40 milliEquivalent(s) Oral every 2 hours  thiamine 100 milliGRAM(s) Oral daily      HEALTH ISSUES - PROBLEM Dx:  Seizures    Hyponatremia    Hypokalemia    OSCAR (acute kidney injury)    Pyelonephritis    Alcohol abuse    Encounter for deep vein thrombosis (DVT) prophylaxis    History of diarrhea    Anemia          
[facility] NEPHROLOGY- PROGRESS NOTE    Patient is a 42yo Male with HTN on ARB,  epilepsy on Keppra, alcohol use w/ ETOH withdrawal hx,  prostate CA. s/p radical resection in 2021, pancreatic cysts sent to ER from Dr. Kerr's office for an episode of being unresponsive. Pt a/w Seizure, pyelonephritis, diarrhea r/o CDiff and OSCAR with hyponatremia. Nephrology consulted for Elevated serum creatinine.      Hospital Medications: Medications reviewed.  REVIEW OF SYSTEMS: Denies any complaints  CONSTITUTIONAL: No fevers or chills  RESPIRATORY: No shortness of breath  CARDIOVASCULAR: No chest pain.  GASTROINTESTINAL: No nausea, vomiting, or abdominal pain. +denies diarrhea   VASCULAR: No bilateral lower extremity edema.     VITALS:  T(F): 97.2 (02-02-24 @ 11:14), Max: 98.6 (02-02-24 @ 04:50)  HR: 95 (02-02-24 @ 11:14)  BP: 125/86 (02-02-24 @ 11:14)  RR: 18 (02-02-24 @ 11:14)  SpO2: 100% (02-02-24 @ 11:14)  Wt(kg): --    02-01 @ 07:01  -  02-02 @ 07:00  --------------------------------------------------------  IN: 750 mL / OUT: 0 mL / NET: 750 mL      PHYSICAL EXAM:  Constitutional: NAD/ more awake  HEENT: anicteric sclera,   Respiratory: CTAB, no wheezes, rales or rhonchi  Cardiovascular: S1, S2, RRR  Gastrointestinal: BS+, soft, NT/ND  : No lee.  Extremities: No peripheral edema    LABS:  02-02    137  |  108  |  45<H>  ----------------------------<  99  3.8   |  21<L>  |  1.55<H>    Ca    9.1      02 Feb 2024 07:20  Phos  2.2     02-02  Mg     1.5     02-02    TPro  5.8<L>  /  Alb      /  TBili      /  DBili      /  AST      /  ALT      /  AlkPhos      02-01    Creatinine Trend: 1.55 <--, 2.18 <--, 2.43 <--, 2.57 <--, 3.13 <--                        7.8    7.79  )-----------( 351      ( 02 Feb 2024 12:20 )             24.2     Urine Studies:  Urinalysis Basic - ( 02 Feb 2024 07:20 )    Color:  / Appearance:  / SG:  / pH:   Gluc: 99 mg/dL / Ketone:   / Bili:  / Urobili:    Blood:  / Protein:  / Nitrite:    Leuk Esterase:  / RBC:  / WBC    Sq Epi:  / Non Sq Epi:  / Bacteria:       Osmolality, Random Urine: 301 mos/kg (02-01 @ 04:38)  Sodium, Random Urine: 33 mmol/L (02-01 @ 04:38)  Osmolality, Random Urine: 186 mos/kg (01-30 @ 19:00)  Sodium, Random Urine: 16 mmol/L (01-30 @ 19:00)  Creatinine, Random Urine: 36 mg/dL (01-30 @ 19:00)  Potassium, Random Urine: 2 mmol/L (01-30 @ 19:00)      
Reason for consult:    HPI:  43-year-old  male, leaves with his mother, ambulates w/ a cane with pmhx of  epilepsy on Keppra, alcohol misuse w/ ETOH withdrawal hx, HTN, prostate CA. s/p radical resection in 2021, currently undergoing OP w/u with GI Dr. Kerr for pancreatic cysts who was EMS to the ED from Dr. Kerr's office after he had an episode of being unresponsive. As per pt's sister Ms. Cannon who provided collateral information over the phone, the pt  slumped over to the side,  his whole body was stiff and then had generalized body shaking, he was rolling his eyes and was drooling, did not loose control of bladder or bowel. The episode lasted  for about 30 sec. As per pt's sister, he was a" little dazed", but then after about 5 min he was fairly back to baseline. Pt endorses last episode of seizure was about 2 months ago, he endorses good compliance to his medications, however, family members are not sure whether or not he is actually compliant. Pt sister informs that he was seen by OP neurologist 2 wks ago (Dr. De Santiago [facility] Neuro), who changed anti-seizure medication from BID to once a day.  Pt's sister also informs that pt drinks ETOH daily. However,  pt endorses he drinks on and off. As per pt, last drink was yesterday, drank 1 pint of Vodka.      At the time of my assessment pt informs that he feels weak and tired. Informs he has also been experiencing abdominal pain associated with 3-4 episodes of daily non-bloody loose stool that can be liquid at times for the past 2 wks. Has also had decreased PO intake, multiple episodes of Nausea and vomiting. Does not feel nauseous currently, denies HA, auditory, visual or tactile hallucinations.  Pt also endorses increased urinary frequency w/o dysuria. Pt and family deny recent hospitalizations or use of antibiotic     ED course  Tmax 98.2 HR 60-74 BP 97/62 O2SAT 100% on RA  Na 123, K 2.8, Mg 1.3  SCr 3.13 , Lact 3.2  UA + pyuria and bacteriuria  CTH diffuse brain parenchyma volume loss  CT A/P b/l perinephric stranding, diverticulosis w/o diverticulitis  s/p CTX 1 gr + 1 L NS + Mg +K replacement (30 Jan 2024 16:33)    Subjective:  Patient seen at bedside.  Patient is feeling some abd pain.  No overnight events.        Allergies    No Known Allergies    Intolerances    MEDICATIONS  (STANDING):  cefTRIAXone   IVPB 1000 milliGRAM(s) IV Intermittent every 24 hours  cholecalciferol 2000 Unit(s) Oral daily  cyanocobalamin 1000 MICROGram(s) Oral daily  folic acid 1 milliGRAM(s) Oral daily  heparin   Injectable 5000 Unit(s) SubCutaneous every 8 hours  latanoprost 0.005% Ophthalmic Solution 1 Drop(s) Both EYES at bedtime  levETIRAcetam 750 milliGRAM(s) Oral two times a day  LORazepam   Injectable 2 milliGRAM(s) IV Push every 4 hours  LORazepam   Injectable 1.5 milliGRAM(s) IV Push every 4 hours  LORazepam   Injectable   IV Push   multivitamin 1 Tablet(s) Oral daily  potassium chloride   Powder 40 milliEquivalent(s) Oral every 2 hours  sodium chloride 0.45%. 1000 milliLiter(s) (75 mL/Hr) IV Continuous <Continuous>  thiamine IVPB 500 milliGRAM(s) IV Intermittent every 8 hours    MEDICATIONS  (PRN):  acetaminophen     Tablet .. 650 milliGRAM(s) Oral every 6 hours PRN Temp greater or equal to 38C (100.4F), Mild Pain (1 - 3)  ondansetron Injectable 4 milliGRAM(s) IV Push every 8 hours PRN Nausea and/or Vomiting      ROS  14 point ROS negative except for above    Vital Signs Last 24 Hrs  T(C): 36.9 (01 Feb 2024 11:03), Max: 36.9 (31 Jan 2024 15:38)  T(F): 98.5 (01 Feb 2024 11:03), Max: 98.5 (01 Feb 2024 11:03)  HR: 96 (01 Feb 2024 11:03) (90 - 145)  BP: 111/70 (01 Feb 2024 11:03) (111/70 - 133/83)  BP(mean): 91 (31 Jan 2024 22:15) (85 - 91)  RR: 18 (01 Feb 2024 11:03) (17 - 19)  SpO2: 98% (01 Feb 2024 11:03) (97% - 100%)    Parameters below as of 01 Feb 2024 11:03  Patient On (Oxygen Delivery Method): room air      PE  NAD  Awake, alert  Anicteric, MMM  RRR  CTAB  Abd soft, NT, ND  No c/c/e  No rash grossly  FROM                                               8.3    8.54  )-----------( 415      ( 01 Feb 2024 05:43 )             25.4   02-01    136  |  108  |  64<H>  ----------------------------<  116<H>  3.7   |  20<L>  |  2.18<H>    Ca    9.3      01 Feb 2024 05:43  Phos  2.5     02-01  Mg     1.4     02-01    TPro  5.8<L>  /  Alb  x   /  TBili  x   /  DBili  x   /  AST  x   /  ALT  x   /  AlkPhos  x   02-01  
[facility] NEPHROLOGY- PROGRESS NOTE    Patient is a 44yo Male with HTN on ARB,  epilepsy on Keppra, alcohol use w/ ETOH withdrawal hx,  prostate CA. s/p radical resection in 2021, pancreatic cysts sent to ER from Dr. Kerr's office for an episode of being unresponsive. Pt a/w Seizure, pyelonephritis, diarrhea r/o CDiff and OSCAR with hyponatremia. Nephrology consulted for Elevated serum creatinine.      Hospital Medications: Medications reviewed.  REVIEW OF SYSTEMS: Denies any complaints  CONSTITUTIONAL: No fevers or chills  RESPIRATORY: No shortness of breath  CARDIOVASCULAR: No chest pain.  GASTROINTESTINAL: No nausea, vomiting, or abdominal pain. +denies diarrhea   VASCULAR: No bilateral lower extremity edema.     VITALS:  T(F): 98.5 (02-01-24 @ 11:03), Max: 98.5 (02-01-24 @ 11:03)  HR: 96 (02-01-24 @ 11:03)  BP: 111/70 (02-01-24 @ 11:03)  RR: 18 (02-01-24 @ 11:03)  SpO2: 98% (02-01-24 @ 11:03)  Wt(kg): --  Height (cm): 175.3 (01-30 @ 10:18)  Weight (kg): 50.3 (01-30 @ 10:18)  BMI (kg/m2): 16.4 (01-30 @ 10:18)  BSA (m2): 1.61 (01-30 @ 10:18)    02-01 @ 07:01  -  02-01 @ 12:59  --------------------------------------------------------  IN: 450 mL / OUT: 0 mL / NET: 450 mL      PHYSICAL EXAM:  Constitutional: NAD/ sleeping mid conversation  HEENT: anicteric sclera,   Respiratory: CTAB, no wheezes, rales or rhonchi  Cardiovascular: S1, S2, RRR  Gastrointestinal: BS+, soft, NT/ND  : No lee.  Extremities: No peripheral edema    LABS:  02-01  136 <--, 132 <--, 127 <--, 123 <--  136  |  108  |  64<H>  ----------------------------<  116<H>  3.7   |  20<L>  |  2.18<H>    Ca    9.3      01 Feb 2024 05:43  Phos  2.5     02-01  Mg     1.4     02-01    TPro  5.8<L>  /  Alb      /  TBili      /  DBili      /  AST      /  ALT      /  AlkPhos      02-01    Creatinine Trend: 2.18 <--, 2.43 <--, 2.57 <--, 3.13 <--                        8.3    8.54  )-----------( 415      ( 01 Feb 2024 05:43 )             25.4     Urine Studies:  Urinalysis Basic - ( 01 Feb 2024 05:43 )    Color:  / Appearance:  / SG:  / pH:   Gluc: 116 mg/dL / Ketone:   / Bili:  / Urobili:    Blood:  / Protein:  / Nitrite:    Leuk Esterase:  / RBC:  / WBC    Sq Epi:  / Non Sq Epi:  / Bacteria:       Osmolality, Random Urine: 301 mos/kg (02-01 @ 04:38)  Sodium, Random Urine: 33 mmol/L (02-01 @ 04:38)  Osmolality, Random Urine: 186 mos/kg (01-30 @ 19:00)  Sodium, Random Urine: 16 mmol/L (01-30 @ 19:00)  Creatinine, Random Urine: 36 mg/dL (01-30 @ 19:00)  Potassium, Random Urine: 2 mmol/L (01-30 @ 19:00)    RADIOLOGY & ADDITIONAL STUDIES:  
NP Note discussed with  primary attending    Patient is a 43y old  Male who presents with a chief complaint of seizure (30 Jan 2024 16:33)      INTERVAL HPI/OVERNIGHT EVENTS: no new complaints    MEDICATIONS  (STANDING):  cefTRIAXone   IVPB 1000 milliGRAM(s) IV Intermittent every 24 hours  cholecalciferol 2000 Unit(s) Oral daily  cyanocobalamin 1000 MICROGram(s) Oral daily  folic acid 1 milliGRAM(s) Oral daily  heparin   Injectable 5000 Unit(s) SubCutaneous every 8 hours  latanoprost 0.005% Ophthalmic Solution 1 Drop(s) Both EYES at bedtime  levETIRAcetam 750 milliGRAM(s) Oral two times a day  LORazepam   Injectable 1.5 milliGRAM(s) IV Push every 4 hours  LORazepam   Injectable   IV Push   LORazepam   Injectable 2 milliGRAM(s) IV Push every 4 hours  multivitamin 1 Tablet(s) Oral daily  potassium chloride   Powder 40 milliEquivalent(s) Oral every 2 hours  sodium chloride 0.45%. 1000 milliLiter(s) (75 mL/Hr) IV Continuous <Continuous>  thiamine IVPB 500 milliGRAM(s) IV Intermittent every 8 hours    MEDICATIONS  (PRN):  acetaminophen     Tablet .. 650 milliGRAM(s) Oral every 6 hours PRN Temp greater or equal to 38C (100.4F), Mild Pain (1 - 3)  ondansetron Injectable 4 milliGRAM(s) IV Push every 8 hours PRN Nausea and/or Vomiting      __________________________________________________  REVIEW OF SYSTEMS:    CONSTITUTIONAL: No fever,   RESPIRATORY: No cough; No shortness of breath  CARDIOVASCULAR: No chest pain, no palpitations  GASTROINTESTINAL: No pain. No nausea or vomiting; No diarrhea   NEUROLOGICAL: No headache or numbness, no tremors  MUSCULOSKELETAL: No joint pain, no muscle pain  GENITOURINARY: no dysuria, no frequency, no hesitancy        Vital Signs Last 24 Hrs  T(C): 36.6 (31 Jan 2024 10:30), Max: 36.8 (30 Jan 2024 12:26)  T(F): 97.9 (31 Jan 2024 10:30), Max: 98.2 (30 Jan 2024 12:26)  HR: 101 (31 Jan 2024 10:30) (74 - 101)  BP: 110/71 (31 Jan 2024 10:30) (92/61 - 110/71)  BP(mean): --  RR: 20 (31 Jan 2024 10:30) (16 - 20)  SpO2: 99% (31 Jan 2024 10:30) (97% - 100%)    Parameters below as of 31 Jan 2024 10:30  Patient On (Oxygen Delivery Method): room air        ________________________________________________  PHYSICAL EXAM:  GENERAL: NAD,   CHEST/LUNG: Clear to ausculitation bilaterally  HEART: S1 S2  regular;  ABDOMEN: Soft, Nontender, Nondistended; Bowel sounds present  EXTREMITIES: no cyanosis; no edema; no calf tenderness  SKIN: warm and dry; no rash  NERVOUS SYSTEM:  sleepy, opens eyes to voice, falls back to sleep    _________________________________________________  LABS:                        8.8    7.67  )-----------( 468      ( 31 Jan 2024 05:05 )             26.3     01-31    132<L>  |  104  |  68<H>  ----------------------------<  104<H>  3.8   |  17<L>  |  2.43<H>    Ca    9.1      31 Jan 2024 05:05  Phos  2.9     01-31  Mg     1.4     01-31    TPro  7.2  /  Alb  3.5  /  TBili  0.7  /  DBili  x   /  AST  10  /  ALT  11  /  AlkPhos  58  01-31      Urinalysis Basic - ( 31 Jan 2024 05:05 )    Color: x / Appearance: x / SG: x / pH: x  Gluc: 104 mg/dL / Ketone: x  / Bili: x / Urobili: x   Blood: x / Protein: x / Nitrite: x   Leuk Esterase: x / RBC: x / WBC x   Sq Epi: x / Non Sq Epi: x / Bacteria: x      CAPILLARY BLOOD GLUCOSE            RADIOLOGY & ADDITIONAL TESTS:    Imaging Personally Reviewed:  YES/NO    Consultant(s) Notes Reviewed:   YES/ No    Care Discussed with Consultants :     Plan of care was discussed with patient and /or primary care giver; all questions and concerns were addressed and care was aligned with patient's wishes.

## 2024-02-04 LAB
-  AMPICILLIN: SIGNIFICANT CHANGE UP
-  CIPROFLOXACIN: SIGNIFICANT CHANGE UP
-  LEVOFLOXACIN: SIGNIFICANT CHANGE UP
-  NITROFURANTOIN: SIGNIFICANT CHANGE UP
-  TETRACYCLINE: SIGNIFICANT CHANGE UP
-  VANCOMYCIN: SIGNIFICANT CHANGE UP
CULTURE RESULTS: ABNORMAL
CULTURE RESULTS: SIGNIFICANT CHANGE UP
CULTURE RESULTS: SIGNIFICANT CHANGE UP
METHOD TYPE: SIGNIFICANT CHANGE UP
ORGANISM # SPEC MICROSCOPIC CNT: ABNORMAL
ORGANISM # SPEC MICROSCOPIC CNT: ABNORMAL
SPECIMEN SOURCE: SIGNIFICANT CHANGE UP

## 2024-02-05 ENCOUNTER — APPOINTMENT (OUTPATIENT)
Dept: PULMONOLOGY | Facility: CLINIC | Age: 44
End: 2024-02-05

## 2024-02-05 LAB
CALPROTECTIN STL-MCNT: 13 UG/G — SIGNIFICANT CHANGE UP (ref 0–120)
LACTOFERRIN STL-MCNC: <1 CD:794062635 — SIGNIFICANT CHANGE UP (ref 0–7.24)

## 2024-02-06 LAB
% ALBUMIN: 54.1 % — SIGNIFICANT CHANGE UP
% ALPHA 1: 6.5 % — SIGNIFICANT CHANGE UP
% ALPHA 2: 11.5 % — SIGNIFICANT CHANGE UP
% BETA: 11 % — SIGNIFICANT CHANGE UP
% GAMMA: 16.9 % — SIGNIFICANT CHANGE UP
% M SPIKE: SIGNIFICANT CHANGE UP
ALBUMIN SERPL ELPH-MCNC: 3.1 G/DL — LOW (ref 3.6–5.5)
ALBUMIN/GLOB SERPL ELPH: 1.1 RATIO — SIGNIFICANT CHANGE UP
ALPHA1 GLOB SERPL ELPH-MCNC: 0.4 G/DL — SIGNIFICANT CHANGE UP (ref 0.1–0.4)
ALPHA2 GLOB SERPL ELPH-MCNC: 0.7 G/DL — SIGNIFICANT CHANGE UP (ref 0.5–1)
B-GLOBULIN SERPL ELPH-MCNC: 0.6 G/DL — SIGNIFICANT CHANGE UP (ref 0.5–1)
GAMMA GLOBULIN: 1 G/DL — SIGNIFICANT CHANGE UP (ref 0.6–1.6)
INTERPRETATION SERPL IFE-IMP: SIGNIFICANT CHANGE UP
M-SPIKE: SIGNIFICANT CHANGE UP (ref 0–0)
PROT PATTERN SERPL ELPH-IMP: SIGNIFICANT CHANGE UP
PROT SERPL-MCNC: 5.8 G/DL — LOW (ref 6–8.3)

## 2024-02-22 NOTE — DISCHARGE NOTE PROVIDER - NSRESEARCHGRANT_OVERRIDEREC_GEN_A_CORE
This is a surgical and/or non-medical patient. Quality 226: Preventive Care And Screening: Tobacco Use: Screening And Cessation Intervention: Patient screened for tobacco use and is an ex/non-smoker Detail Level: Detailed Quality 431: Preventive Care And Screening: Unhealthy Alcohol Use - Screening: Patient not identified as an unhealthy alcohol user when screened for unhealthy alcohol use using a systematic screening method Quality 130: Documentation Of Current Medications In The Medical Record: Current Medications Documented

## 2024-02-29 ENCOUNTER — TRANSCRIPTION ENCOUNTER (OUTPATIENT)
Age: 44
End: 2024-02-29

## 2024-02-29 ENCOUNTER — APPOINTMENT (OUTPATIENT)
Dept: GASTROENTEROLOGY | Facility: CLINIC | Age: 44
End: 2024-02-29
Payer: MEDICAID

## 2024-02-29 VITALS
WEIGHT: 119 LBS | SYSTOLIC BLOOD PRESSURE: 139 MMHG | OXYGEN SATURATION: 92 % | BODY MASS INDEX: 17.63 KG/M2 | HEART RATE: 98 BPM | HEIGHT: 69 IN | TEMPERATURE: 97.3 F | DIASTOLIC BLOOD PRESSURE: 79 MMHG

## 2024-02-29 PROCEDURE — 99213 OFFICE O/P EST LOW 20 MIN: CPT

## 2024-02-29 NOTE — ASSESSMENT
[FreeTextEntry1] : 44M with pmhx of prostate cancer s/p treatment, etoh use, colonic perforation 2/2 ?foreign body ingestion s/p colostomy and reversal (1990s), ventral hernia repair x2, epilepsy, asthma, HTN presenting for follow-up. Last visit had presyncopal episode in setting of etoh use, sent to ER. States received IVF, felt better and discharged. Now feeling much better. Here for eval of pancreatic cyst, rapidly increased in size compared to prior imaging. Referred by Dr. Matias for EUS/FNA.  - Counseled on etoh cessation.  - Schedule EUS, possible FNA of pancreatic cyst given rapid increase in size.

## 2024-02-29 NOTE — HISTORY OF PRESENT ILLNESS
[FreeTextEntry1] : 44M with pmhx of prostate cancer s/p treatment, etoh use, colonic perforation 2/2 ?foreign body ingestion s/p colostomy and reversal (1990s), ventral hernia repair x2, epilepsy, asthma, HTN presenting for follow-up. Last visit had presyncopal episode in setting of etoh use, sent to ER. States received IVF, felt better and discharged. Now feeling much better. Here for eval of pancreatic cyst, rapidly increased in size compared to prior imaging. Referred by Dr. Matias for EUS/FNA. Denies any other complaints, no abd pain, n/v/d/c, melena, hematochezia, fever/chills, jaundice, dark urine, or other issues. Denies hx of pancreatitis or other pancreatic issues in the past.   PMHx: Above.  Medications: Albuterol, Keppra, Metoprolol, Valsartan.  Allergies: NKDA. Surgical Hx: Hernia repair x2, colostomy and reversal. SH: Current tobacco use, 1/2 PPD. Current etoh use 700mL of vodka every 2 days. Denies drug use. FH: Denies fhx of GI disorders.

## 2024-02-29 NOTE — PHYSICAL EXAM
[Alert] : alert [Normal Voice/Communication] : normal voice/communication [Healthy Appearing] : healthy appearing [No Acute Distress] : no acute distress [Sclera] : the sclera and conjunctiva were normal [Hearing Threshold Finger Rub Not [place]] : hearing was normal [Normal Lips/Gums] : the lips and gums were normal [Oropharynx] : the oropharynx was normal [Normal Appearance] : the appearance of the neck was normal [No Respiratory Distress] : no respiratory distress [No Neck Mass] : no neck mass was observed [No Acc Muscle Use] : no accessory muscle use [Respiration, Rhythm And Depth] : normal respiratory rhythm and effort [Auscultation Breath Sounds / Voice Sounds] : lungs were clear to auscultation bilaterally [Normal S1, S2] : normal S1 and S2 [Heart Rate And Rhythm] : heart rate was normal and rhythm regular [Murmurs] : no murmurs [Bowel Sounds] : normal bowel sounds [Abdomen Tenderness] : non-tender [No Masses] : no abdominal mass palpated [Abdomen Soft] : soft [] : no hepatosplenomegaly [Oriented To Time, Place, And Person] : oriented to person, place, and time

## 2024-03-26 ENCOUNTER — APPOINTMENT (OUTPATIENT)
Dept: PULMONOLOGY | Facility: CLINIC | Age: 44
End: 2024-03-26
Payer: MEDICAID

## 2024-03-26 VITALS
HEART RATE: 98 BPM | BODY MASS INDEX: 17.18 KG/M2 | TEMPERATURE: 98.6 F | WEIGHT: 116 LBS | HEIGHT: 69 IN | DIASTOLIC BLOOD PRESSURE: 86 MMHG | OXYGEN SATURATION: 98 % | RESPIRATION RATE: 61 BRPM | SYSTOLIC BLOOD PRESSURE: 121 MMHG

## 2024-03-26 DIAGNOSIS — F17.210 NICOTINE DEPENDENCE, CIGARETTES, UNCOMPLICATED: ICD-10-CM

## 2024-03-26 DIAGNOSIS — J43.2 CENTRILOBULAR EMPHYSEMA: ICD-10-CM

## 2024-03-26 PROCEDURE — 99214 OFFICE O/P EST MOD 30 MIN: CPT | Mod: 25

## 2024-03-26 PROCEDURE — 99406 BEHAV CHNG SMOKING 3-10 MIN: CPT

## 2024-03-29 NOTE — ED ADULT NURSE NOTE - NS ED STAT RN HAND OFF 2
Called patient to convey PCP results. Patient understood and agreed to be on a statin thyrapy. Message sent to PCP to send to pharmacy of choice. 2000mg of fish oil a day patient also agreed to.  No further questions, will call office if needed.   ----- Message from DERIK French sent at 3/29/2024  7:33 AM CDT -----  Normal kidney and liver function. Electrolytes normal  Triglycerides are high - should start 2000 mg of fish oil daily. LDL (bad cholesterol) elevated The 10-year ASCVD risk score (Arie DK, et al., 2019) is: 8.1%    Values used to calculate the score:      Age: 45 years      Sex: Male      Is Non- : Yes      Diabetic: No      Tobacco smoker: Yes      Systolic Blood Pressure: 122 mmHg      Is BP treated: No      HDL Cholesterol: 31 mg/dL      Total Cholesterol: 220 mg/dL  Risk is elevated - should start a statin   Thyroid normal  PSA normal  Blood counts normal  UA negative for blood, protein and infection  No evidence of prediabetes or diabetes   Additional handoff

## 2024-04-03 PROBLEM — F17.210 CIGARETTE SMOKER: Status: ACTIVE | Noted: 2023-08-03

## 2024-04-03 PROBLEM — J43.2 CENTRILOBULAR EMPHYSEMA: Status: ACTIVE | Noted: 2022-05-12

## 2024-04-03 NOTE — CONSULT LETTER
[Dear  ___] : Dear  [unfilled], [Consult Letter:] : I had the pleasure of evaluating your patient, [unfilled]. [Please see my note below.] : Please see my note below. [Consult Closing:] : Thank you very much for allowing me to participate in the care of this patient.  If you have any questions, please do not hesitate to contact me. [Sincerely,] : Sincerely, [FreeTextEntry3] : Jie Pierre MD, [facility]

## 2024-04-03 NOTE — ADDENDUM
Patient cleared by psych liaison to go back to group home    Group home -  [facility] Crisis line  [phone]   [FreeTextEntry1] : Included in this visit: Pre-visit preparation reviewing all notes, records and prior consultations reviewing all appropriate labs and imaging Medication reconciliation performing all necessary physical exam and diagnostic testing Counseling patient on their condition and plan of care Coordination of care Completion of notes and documentation

## 2024-04-03 NOTE — PHYSICAL EXAM
[No Acute Distress] : no acute distress [Well Nourished] : well nourished [Well Groomed] : well groomed [Normal Oropharynx] : normal oropharynx [Normal Appearance] : normal appearance [No Neck Mass] : no neck mass [Normal Rate/Rhythm] : normal rate/rhythm [Normal S1, S2] : normal s1, s2 [No Resp Distress] : no resp distress [No Murmurs] : no murmurs [Clear to Auscultation Bilaterally] : clear to auscultation bilaterally [Benign] : benign [No Abnormalities] : no abnormalities [Normal Gait] : normal gait [No Clubbing] : no clubbing [No Cyanosis] : no cyanosis [No Edema] : no edema [Normal Color/ Pigmentation] : normal color/ pigmentation [FROM] : FROM [No Focal Deficits] : no focal deficits [Normal Affect] : normal affect [Oriented x3] : oriented x3

## 2024-04-03 NOTE — ASSESSMENT
[FreeTextEntry1] : 1. centrilobular emphysema with minimal clinical symptoms - PFT with moderately decreased diffusion c/w emphysema - extensive smoking cessation counseling provided pt not interested at this time - PFT next visit  2. Left lingular ill defined area of GGO - resolved on CT from Sept 2022 - too young for LDCT for LCS at this time. - Smoking cessation counseling provided at length, all pharma and non-pharma options were discussed, risks of continued smoking explained. 7min

## 2024-04-03 NOTE — HISTORY OF PRESENT ILLNESS
[TextBox_4] : Patient is 43 yo male is an active smoker with PMHx Asthma on Ventolin GA and Prostate Cancer s/p proctectomy 06/2021. Patient is referred to clinic for abnormal CT chest results from his oncologist, Dr. Matias. He smokes a pack every 3 days. He states minimal use of inhaler. Also endorses seasonal allergies, only aggregated with pollen and outdoor allergens, not currently taking any medications. Patient is vaccinated against COVID x 2, not vaccinated against the flu.   Patient denies any recent sick contacts or illnesses.   8/3/23 Pt here for follow up. Reports his asthma is acting up more. NO wheezing, no coughing, no sob or cunningham. Not using albuterol more. Feels humid air is poorly tolerated. Down to 2 cig/day  4/3/24 Here for follow up asthma/copd stable minimal use of albuterol still smoking undergoing w/u for pancreatic cyst

## 2024-04-19 ENCOUNTER — APPOINTMENT (OUTPATIENT)
Dept: GASTROENTEROLOGY | Facility: HOSPITAL | Age: 44
End: 2024-04-19

## 2024-04-19 ENCOUNTER — OUTPATIENT (OUTPATIENT)
Dept: OUTPATIENT SERVICES | Facility: HOSPITAL | Age: 44
LOS: 1 days | End: 2024-04-19
Payer: MEDICAID

## 2024-04-19 ENCOUNTER — RESULT REVIEW (OUTPATIENT)
Age: 44
End: 2024-04-19

## 2024-04-19 ENCOUNTER — TRANSCRIPTION ENCOUNTER (OUTPATIENT)
Age: 44
End: 2024-04-19

## 2024-04-19 VITALS
TEMPERATURE: 98 F | OXYGEN SATURATION: 100 % | HEART RATE: 104 BPM | RESPIRATION RATE: 16 BRPM | SYSTOLIC BLOOD PRESSURE: 105 MMHG | HEIGHT: 71 IN | WEIGHT: 119.93 LBS | DIASTOLIC BLOOD PRESSURE: 76 MMHG

## 2024-04-19 VITALS
HEART RATE: 89 BPM | RESPIRATION RATE: 20 BRPM | SYSTOLIC BLOOD PRESSURE: 99 MMHG | DIASTOLIC BLOOD PRESSURE: 69 MMHG | OXYGEN SATURATION: 98 %

## 2024-04-19 DIAGNOSIS — Z98.890 OTHER SPECIFIED POSTPROCEDURAL STATES: Chronic | ICD-10-CM

## 2024-04-19 DIAGNOSIS — K86.2 CYST OF PANCREAS: ICD-10-CM

## 2024-04-19 DIAGNOSIS — Z87.828 PERSONAL HISTORY OF OTHER (HEALED) PHYSICAL INJURY AND TRAUMA: Chronic | ICD-10-CM

## 2024-04-19 PROCEDURE — 88312 SPECIAL STAINS GROUP 1: CPT | Mod: 26

## 2024-04-19 PROCEDURE — 88305 TISSUE EXAM BY PATHOLOGIST: CPT | Mod: 26

## 2024-04-19 PROCEDURE — 88305 TISSUE EXAM BY PATHOLOGIST: CPT

## 2024-04-19 PROCEDURE — 43259 EGD US EXAM DUODENUM/JEJUNUM: CPT

## 2024-04-19 PROCEDURE — 88312 SPECIAL STAINS GROUP 1: CPT

## 2024-04-19 PROCEDURE — 43239 EGD BIOPSY SINGLE/MULTIPLE: CPT

## 2024-04-19 RX ORDER — SODIUM CHLORIDE 9 MG/ML
500 INJECTION INTRAMUSCULAR; INTRAVENOUS; SUBCUTANEOUS
Refills: 0 | Status: COMPLETED | OUTPATIENT
Start: 2024-04-19 | End: 2024-04-19

## 2024-04-19 RX ADMIN — SODIUM CHLORIDE 75 MILLILITER(S): 9 INJECTION INTRAMUSCULAR; INTRAVENOUS; SUBCUTANEOUS at 13:40

## 2024-04-19 NOTE — ASU PATIENT PROFILE, ADULT - FALL HARM RISK - UNIVERSAL INTERVENTIONS
Bed in lowest position, wheels locked, appropriate side rails in place/Call bell, personal items and telephone in reach/Instruct patient to call for assistance before getting out of bed or chair/Non-slip footwear when patient is out of bed/[place] to call system/Physically safe environment - no spills, clutter or unnecessary equipment/Purposeful Proactive Rounding/Room/bathroom lighting operational, light cord in reach

## 2024-04-19 NOTE — ASU PREOP CHECKLIST - ALLERGIES REVIEWED
Detail Level: Zone
Recommendation Preamble: The following recommendations were made during the visit:
done

## 2024-04-23 LAB — SURGICAL PATHOLOGY STUDY: SIGNIFICANT CHANGE UP

## 2024-05-23 ENCOUNTER — APPOINTMENT (OUTPATIENT)
Dept: RADIOLOGY | Facility: CLINIC | Age: 44
End: 2024-05-23

## 2024-05-23 ENCOUNTER — APPOINTMENT (OUTPATIENT)
Dept: MRI IMAGING | Facility: CLINIC | Age: 44
End: 2024-05-23

## 2024-06-13 ENCOUNTER — APPOINTMENT (OUTPATIENT)
Dept: GASTROENTEROLOGY | Facility: CLINIC | Age: 44
End: 2024-06-13
Payer: MEDICAID

## 2024-06-13 VITALS
TEMPERATURE: 97.2 F | OXYGEN SATURATION: 97 % | HEART RATE: 106 BPM | BODY MASS INDEX: 16.88 KG/M2 | SYSTOLIC BLOOD PRESSURE: 130 MMHG | WEIGHT: 114 LBS | DIASTOLIC BLOOD PRESSURE: 86 MMHG | HEIGHT: 69 IN

## 2024-06-13 DIAGNOSIS — K86.2 CYST OF PANCREAS: ICD-10-CM

## 2024-06-13 PROCEDURE — G2211 COMPLEX E/M VISIT ADD ON: CPT | Mod: NC

## 2024-06-13 PROCEDURE — 99213 OFFICE O/P EST LOW 20 MIN: CPT

## 2024-06-13 NOTE — HISTORY OF PRESENT ILLNESS
Department of Anesthesiology  Postprocedure Note    Patient: Cortes Rushing  MRN: [id number]  YOB: 2009  Date of evaluation: 5/26/2021  Time:  7:12 AM     Procedure Summary     Date: 05/25/21 Room / Location: [facility] OR  / [facility]    Anesthesia Start:  Anesthesia Stop:     Procedure: RIGHT LEG Natalya (Right ) Diagnosis: (/)    Surgeons: Luz Hodge MD Responsible Provider:     Anesthesia Type: general, MAC ASA Status: 1          Anesthesia Type: general, MAC    Kaci Phase I: Kaci Score: 10    Kaci Phase II:      Last vitals: Reviewed and per EMR flowsheets.        Anesthesia Post Evaluation    Patient location during evaluation: PACU  Patient participation: complete - patient participated  Level of consciousness: awake  Pain score: 3  Airway patency: patent  Nausea & Vomiting: no nausea and no vomiting  Complications: no  Cardiovascular status: blood pressure returned to baseline  Respiratory status: acceptable  Hydration status: euvolemic [FreeTextEntry1] : 44M with pmhx of etoh abuse presenting for follow-up. Had EUS for pancreatic cyst but limited due to patient anatomy. Pt reports ongoing etoh use. Denies any other complaints, no abd pain, n/v/d/c, melena, hematochezia, fever/chills, jaundice, dark urine, or other issues.

## 2024-06-13 NOTE — PHYSICAL EXAM

## 2024-06-13 NOTE — ASSESSMENT
[FreeTextEntry1] : 44M with pmhx of etoh abuse presenting for follow-up. Had EUS for pancreatic cyst but limited due to patient anatomy. Pt reports ongoing etoh use. Denies any other complaints, no abd pain, n/v/d/c, melena, hematochezia, fever/chills, jaundice, dark urine, or other issues. - CT pancreas protocol to reevaluate. - Counseled on etoh cessation.

## 2024-06-30 NOTE — ED PROVIDER NOTE - SEVERE SEPSIS CRITERIA MET YN (MLM)
PRNs in the last 24 hours. Tolerating full feeds over extended feed infusion rate for emesis secondary to hiatal hernia.                Objective   Vital signs (last 24 hours):  Temp:  [36.5 °C-37.3 °C] 36.5 °C  Heart Rate:  [105-162] 117  Resp:  [43-90] 71  SpO2:  [91 %-97 %] 95 %  FiO2 (%):  [5 %-50 %] 50 %    Birth Weight: 790 g  Last Weight: 7272 g   Daily Weight change:     Apnea/Bradycardia:  Date/Time Apnea Count Bradycardia Rate Bradycardia (secs) Event SpO2 Desaturation (secs) Color Change Intervention Activity Prior to Event Position Prior to Event Choking New Intervention Who   06/29/24 2100 -- -- -- 70 -- Dusky -- --  -- -- -- LL   Activity Prior to Event: prongs out by Myrna Watkins RN at 06/29/24 2100 06/29/24 1800 -- -- -- 68  20 secs Circumoral cyanosis Tactile stimulation Crying -- -- -- KK   Event SpO2: prongs out of nose by Venita Eugene RN at 06/29/24 1800   06/29/24 0900 -- -- -- 80 --  -- -- Crying;Active alert;Cares Supine No None KK   Desaturation (secs): cluster by Venita Eugene RN at 06/29/24 0900     Active LDAs:  .       Active .       Name Placement date Placement time Site Days    NG/OG/Feeding Tube (NICU) 8 Fr Left nostril 06/30/24  0000  Left nostril  less than 1    Brain [place]  01/16/24  1300  Right frontal  165                  Respiratory support:  O2 Delivery Method: High flow nasal cannula (4L)     FiO2 (%): 50 %    Vent settings (last 24 hours):  FiO2 (%):  [5 %-50 %] 50 %    Nutrition:  Dietary Orders (From admission, onward)       Start     Ordered    06/20/24 1200  Infant formula  8 times daily      Comments: 120mL/kg/day with 6.5kg weight  Feeds of 99 mL/feed, q3h   Please infuse bolus feed over 1.5 hours    06/20/24 0906                    Intake/Output last 3 shifts:  I/O last 3 completed shifts:  In: 1188 (163.37 mL/kg) [NG/GT:1188]  Out: 718 (98.74 mL/kg) [Urine:718 (2.74 mL/kg/hr)]  Weight: 7.27 kg     Intake/Output this shift:  No intake/output data recorded.      Physical Examination:  General:      Awake/Alert on exam. Lying in open crib in no acute distress. High Flow NC intact  Head:      Brachycephaly. Anterior fontanelle is flat, soft and open with approximated sutures.  shunt is palpable over R scalp.   CNS:      Generalized hypertonia.   Resp:      Lungs with rales, fair to good and equal air exchange throughout. Mild subcostal retractions. No grunting, nasal flaring or head bobbing.   Cardiovascular:       Apical heart rate and rhythm are regular with normal s1/s2. No murmur appreciated. Peripheral pulses are 2+ and equal bilaterally. Pink and well perfused. Capillary refill <3 seconds. Mild generalized edema.   Abdomen:      Abdomen is soft, nondistended and nontender. Bowel sounds heard primarily in the scrotum due to large bilateral inguinal hernias. Umbilical hernia appreciated that is soft, pink, nontender and easily reducible.   Musculoskeletal:       Spontaneous movement in all extremities. Polydactyly of R thumb.   Genitalia:       Large bilateral inguinal hernias appreciated bilaterally that are soft, pink, non tender. Uncircumcised. Edema of the groin and penis appreciated. Anus visually patent.   Skin:       Skin is warm, soft, pink/pale and dry. Buttocks with bilateral excoriations, no bleeding appreciated.   Other:       Ear tag appreciated on R ear.    Labs:  Results from last 7 days   Lab Units 06/27/24  0617   WBC AUTO x10*3/uL 10.4   HEMOGLOBIN g/dL  06/27/24  0617   BILIRUBIN TOTAL mg/dL 0.2     CBG  Results from last 7 days   Lab Units 06/27/24  0619   POCT PH, CAPILLARY pH 7.38   POCT PCO2, CAPILLARY mm Hg 67*   POCT PO2, CAPILLARY mm Hg 64*   POCT HCO3 CALCULATED, CAPILLARY mmol/L 39.6*   POCT BASE EXCESS, CAPILLARY mmol/L 11.5*   POCT SO2, CAPILLARY % 95   POCT ANION GAP, CAPILLARY mmol/L 5*   POCT SODIUM, CAPILLARY mmol/L 134   POCT CHLORIDE, CAPILLARY mmol/L 95*   POCT IONIZED CALCIUM, CAPILLARY mmol/L 1.31   POCT GLUCOSE, CAPILLARY mg/dL 93   POCT LACTATE, CAPILLARY mmol/L 0.9*   POCT HEMOGLOBIN, CAPILLARY g/dL 13.8*   POCT HEMATOCRIT CALCULATED, CAPILLARY % 41.0*   POCT POTASSIUM, CAPILLARY mmol/L 5.2   POCT OXY HEMOGLOBIN, CAPILLARY % 91.9*        LFT  Results from last 7 days   Lab Units 06/27/24  0617   ALBUMIN g/dL 4.3   BILIRUBIN TOTAL mg/dL 0.2   BILIRUBIN DIRECT mg/dL 0.1   ALK PHOS U/L 420   ALT U/L 26   AST U/L 28   PROTEIN TOTAL g/dL 6.1     Pain  N-PASS Pain/Agitation Score: 0       Scheduled medications  budesonide, 0.25 mg, nebulization, BID  chlorothiazide, 10 mg/kg (Dosing Weight), nasogastric tube, q12h  cholecalciferol, 400 Units, nasogastric tube, Daily  clonidine, 2 mcg/kg (Adjusted), nasogastric tube, q6h ELIGIO  ferrous sulfate (as mg of FE), 15 mg of iron, nasogastric tube, q24h  gabapentin, 20 mg/kg (Dosing Weight), nasogastric tube, q8h  methadone, 1 mg, oral, q8h  potassium chloride, 1 mEq/kg (Dosing Weight), nasogastric tube, q12h ELIGIO  sodium chloride, 1.5 mEq/kg (Dosing Weight), nasogastric tube, q6h ELIGIO  sulfamethoxazole-trimethoprim, 2 mg/kg/day of trimethoprim (Dosing Weight), nasogastric tube, q24h  ELIGIO      Continuous medications  oxygen, , Last Rate: 4 L/min (06/29/24 1959)      PRN medications  PRN medications: albuterol, clonidine, sodium chloride-Aloe vera gel, white petrolatum     Sepsis Criteria were met:

## 2024-07-26 ENCOUNTER — RESULT REVIEW (OUTPATIENT)
Age: 44
End: 2024-07-26

## 2024-07-26 ENCOUNTER — APPOINTMENT (OUTPATIENT)
Dept: CT IMAGING | Facility: CLINIC | Age: 44
End: 2024-07-26

## 2024-07-26 PROCEDURE — 74177 CT ABD & PELVIS W/CONTRAST: CPT

## 2024-08-22 ENCOUNTER — APPOINTMENT (OUTPATIENT)
Dept: GASTROENTEROLOGY | Facility: CLINIC | Age: 44
End: 2024-08-22
Payer: MEDICAID

## 2024-08-22 VITALS
OXYGEN SATURATION: 99 % | TEMPERATURE: 97 F | HEIGHT: 69 IN | HEART RATE: 84 BPM | DIASTOLIC BLOOD PRESSURE: 86 MMHG | SYSTOLIC BLOOD PRESSURE: 128 MMHG | BODY MASS INDEX: 16.74 KG/M2 | WEIGHT: 113 LBS

## 2024-08-22 DIAGNOSIS — K86.2 CYST OF PANCREAS: ICD-10-CM

## 2024-08-22 PROCEDURE — 99213 OFFICE O/P EST LOW 20 MIN: CPT

## 2024-08-22 PROCEDURE — G2211 COMPLEX E/M VISIT ADD ON: CPT | Mod: NC

## 2024-08-23 NOTE — ASSESSMENT
[FreeTextEntry1] : 44M with pmhx of etoh abuse presenting for follow-up on pancreatic cysts. Pt states still actively drinking, bottle of liquor and beers on the weekends. Denies any other complaints, no abd pain, n/v/d/c, melena, hematochezia, fever/chills, jaundice, dark urine, or other issues.  - Repeat CT scan showed new pancreatic cyst and questionable pancreatic ductal dilation. - Plan for MRI/MRCP to further evaluate cysts.  - RTC 2-3 weeks.

## 2024-08-23 NOTE — PHYSICAL EXAM

## 2024-08-23 NOTE — HISTORY OF PRESENT ILLNESS
[FreeTextEntry1] : 44M with pmhx of etoh abuse presenting for follow-up on pancreatic cysts. Pt states still actively drinking, bottle of liquor and beers on the weekends. Denies any other complaints, no abd pain, n/v/d/c, melena, hematochezia, fever/chills, jaundice, dark urine, or other issues.

## 2024-09-23 ENCOUNTER — APPOINTMENT (OUTPATIENT)
Dept: MRI IMAGING | Facility: CLINIC | Age: 44
End: 2024-09-23
Payer: MEDICAID

## 2024-09-23 PROCEDURE — 74181 MRI ABDOMEN W/O CONTRAST: CPT

## 2024-11-27 ENCOUNTER — APPOINTMENT (OUTPATIENT)
Dept: GASTROENTEROLOGY | Facility: CLINIC | Age: 44
End: 2024-11-27
Payer: MEDICAID

## 2024-11-27 VITALS
HEIGHT: 69 IN | DIASTOLIC BLOOD PRESSURE: 73 MMHG | SYSTOLIC BLOOD PRESSURE: 107 MMHG | BODY MASS INDEX: 16.74 KG/M2 | WEIGHT: 113 LBS | HEART RATE: 108 BPM

## 2024-11-27 DIAGNOSIS — K86.2 CYST OF PANCREAS: ICD-10-CM

## 2024-11-27 PROCEDURE — 99213 OFFICE O/P EST LOW 20 MIN: CPT

## 2024-12-17 NOTE — DISEASE MANAGEMENT
[1] : T1 [c] : c [0] : N0 [10-20] : 10 - 20 ng/mL [Biopsy] : Patient had a biopsy on [6] : Template Biopsy Ara Score: 6 [] : Patient had a Prostate MRI [Neg] : Negative [BiopsyDate] : 9/18/2020 [TotalCores] : 12 [TotalPositiveCores] : 2 [MaxCoreInvolvement] : 10% [FreeTextEntry7] : PSA at diagnosis 11.2 ng/mL.\par MRI done at [facility]: reported as PIRAD 4 but no lesion detected. [I] : I [Radical Prostatectomy] : Radical Prostatectomy [Patient had a radical prostatectomy] : Patient had a radical prostatectomy  [7(3+4)] : Ara Score 7(3+4) [Negative] : Negative margins [2] : T2 [X] : NX [RadicalProstatectomyDate] : 6/4/2021 [TotalNumberofnodesresected] : 0 [PositiveNodes] : 0 61 yo male, A&Ox4, Patient reports "I almost collapsed after coming out of bathroom. The room was spinning. I vomited and felt better. I have a headache and have a nausea feeling"  Patient appears pale. Mildly diaphoretic. Per son, patient sat down, he was slumped to side, and he was unconscious. LOC 1 minute, awake with slurred speech.  am  Right arm weakness after surgery . SANCHEZ x4 without difficultly. No drift or droop. Speech clear and appropriate. Gait with drift to right.  PMH: DM2

## 2024-12-26 NOTE — DIETITIAN NUTRITION RISK NOTIFICATION - MUSCLE MASS (LOSS OF MUSCLE)
Pt AAOx4. VSS, afebrile. No complaints of SOB/CP/discomfort. Facial contusions noted from previous fall. Skin tears on both arms--covered w/ foam dressings. Redness noted to left and right sides of lower abdomen and flanks--Rocephin given per MAR and Miconazole powder applied. Redness on buttocks noted--barrier cream applied. Pt denies pain throughout shift. Voids per urinal at bedside. +3 scrotal edema noted. One BM reported. Pt refused scheduled lactulose. Pt up OOB w/ standby assist and walker. Reviewed plan of care w/ pt. Remained free from falls/traumas/injuries. Questions answered and encouraged. Call light at bedside, non-skid socks on. Bed in low position, wheels locked. Standard precautions maintained.    Clavicles.../Shoulders...

## 2025-01-07 ENCOUNTER — NON-APPOINTMENT (OUTPATIENT)
Age: 45
End: 2025-01-07

## 2025-01-14 ENCOUNTER — APPOINTMENT (OUTPATIENT)
Dept: GASTROENTEROLOGY | Facility: CLINIC | Age: 45
End: 2025-01-14
Payer: MEDICAID

## 2025-01-14 DIAGNOSIS — K86.2 CYST OF PANCREAS: ICD-10-CM

## 2025-01-14 PROCEDURE — 99213 OFFICE O/P EST LOW 20 MIN: CPT | Mod: 93

## 2025-01-28 ENCOUNTER — NON-APPOINTMENT (OUTPATIENT)
Age: 45
End: 2025-01-28

## 2025-01-28 ENCOUNTER — APPOINTMENT (OUTPATIENT)
Dept: SURGICAL ONCOLOGY | Facility: CLINIC | Age: 45
End: 2025-01-28
Payer: MEDICAID

## 2025-01-28 VITALS
DIASTOLIC BLOOD PRESSURE: 80 MMHG | SYSTOLIC BLOOD PRESSURE: 120 MMHG | BODY MASS INDEX: 17.98 KG/M2 | OXYGEN SATURATION: 94 % | HEART RATE: 109 BPM | WEIGHT: 120 LBS | HEIGHT: 68.5 IN

## 2025-01-28 DIAGNOSIS — Z80.42 FAMILY HISTORY OF MALIGNANT NEOPLASM OF PROSTATE: ICD-10-CM

## 2025-01-28 DIAGNOSIS — F41.9 ANXIETY DISORDER, UNSPECIFIED: ICD-10-CM

## 2025-01-28 DIAGNOSIS — E78.5 HYPERLIPIDEMIA, UNSPECIFIED: ICD-10-CM

## 2025-01-28 DIAGNOSIS — Z82.49 FAMILY HISTORY OF ISCHEMIC HEART DISEASE AND OTHER DISEASES OF THE CIRCULATORY SYSTEM: ICD-10-CM

## 2025-01-28 DIAGNOSIS — I10 ESSENTIAL (PRIMARY) HYPERTENSION: ICD-10-CM

## 2025-01-28 DIAGNOSIS — K86.2 CYST OF PANCREAS: ICD-10-CM

## 2025-01-28 DIAGNOSIS — Z85.46 PERSONAL HISTORY OF MALIGNANT NEOPLASM OF PROSTATE: ICD-10-CM

## 2025-01-28 DIAGNOSIS — F19.90 OTHER PSYCHOACTIVE SUBSTANCE USE, UNSPECIFIED, UNCOMPLICATED: ICD-10-CM

## 2025-01-28 PROCEDURE — 99204 OFFICE O/P NEW MOD 45 MIN: CPT

## 2025-01-28 RX ORDER — BRIMONIDINE TARTRATE 1 MG/ML
SOLUTION/ DROPS OPHTHALMIC
Refills: 0 | Status: ACTIVE | COMMUNITY

## 2025-01-28 RX ORDER — LABETALOL HYDROCHLORIDE 400 MG/1
TABLET, FILM COATED ORAL
Refills: 0 | Status: ACTIVE | COMMUNITY

## 2025-01-28 RX ORDER — OMEGA-3/DHA/EPA/FISH OIL 300-1000MG
CAPSULE ORAL
Refills: 0 | Status: ACTIVE | COMMUNITY

## 2025-01-28 RX ORDER — MULTIVIT WITH MIN/ALA/HERBS 50 MG
TABLET ORAL
Refills: 0 | Status: ACTIVE | COMMUNITY

## 2025-01-28 RX ORDER — PNV NO.95/FERROUS FUM/FOLIC AC 28MG-0.8MG
TABLET ORAL
Refills: 0 | Status: ACTIVE | COMMUNITY

## 2025-02-19 NOTE — ASU PATIENT PROFILE, ADULT - FALL HARM RISK - UNIVERSAL INTERVENTIONS
Bed in lowest position, wheels locked, appropriate side rails in place/Call bell, personal items and telephone in reach/Instruct patient to call for assistance before getting out of bed or chair/Non-slip footwear when patient is out of bed/[place] to call system/Physically safe environment - no spills, clutter or unnecessary equipment/Purposeful Proactive Rounding/Room/bathroom lighting operational, light cord in reach

## 2025-02-20 ENCOUNTER — RESULT REVIEW (OUTPATIENT)
Age: 45
End: 2025-02-20

## 2025-02-20 ENCOUNTER — TRANSCRIPTION ENCOUNTER (OUTPATIENT)
Age: 45
End: 2025-02-20

## 2025-02-20 ENCOUNTER — APPOINTMENT (OUTPATIENT)
Dept: GASTROENTEROLOGY | Facility: HOSPITAL | Age: 45
End: 2025-02-20

## 2025-02-20 ENCOUNTER — OUTPATIENT (OUTPATIENT)
Dept: OUTPATIENT SERVICES | Facility: HOSPITAL | Age: 45
LOS: 1 days | Discharge: HOME | End: 2025-02-20
Payer: MEDICAID

## 2025-02-20 VITALS
DIASTOLIC BLOOD PRESSURE: 97 MMHG | OXYGEN SATURATION: 99 % | TEMPERATURE: 98 F | HEIGHT: 71 IN | HEART RATE: 101 BPM | RESPIRATION RATE: 14 BRPM | SYSTOLIC BLOOD PRESSURE: 137 MMHG | WEIGHT: 123.9 LBS

## 2025-02-20 VITALS
SYSTOLIC BLOOD PRESSURE: 139 MMHG | HEART RATE: 83 BPM | OXYGEN SATURATION: 99 % | RESPIRATION RATE: 16 BRPM | DIASTOLIC BLOOD PRESSURE: 90 MMHG

## 2025-02-20 DIAGNOSIS — Z87.828 PERSONAL HISTORY OF OTHER (HEALED) PHYSICAL INJURY AND TRAUMA: Chronic | ICD-10-CM

## 2025-02-20 DIAGNOSIS — Z98.890 OTHER SPECIFIED POSTPROCEDURAL STATES: Chronic | ICD-10-CM

## 2025-02-20 DIAGNOSIS — K86.2 CYST OF PANCREAS: ICD-10-CM

## 2025-02-20 PROCEDURE — 88305 TISSUE EXAM BY PATHOLOGIST: CPT | Mod: 26

## 2025-02-20 PROCEDURE — 88305 TISSUE EXAM BY PATHOLOGIST: CPT | Mod: 26,XP

## 2025-02-20 PROCEDURE — 88173 CYTOPATH EVAL FNA REPORT: CPT | Mod: 26

## 2025-02-20 PROCEDURE — 43242 EGD US FINE NEEDLE BX/ASPIR: CPT

## 2025-02-20 PROCEDURE — 43239 EGD BIOPSY SINGLE/MULTIPLE: CPT | Mod: 59

## 2025-02-20 RX ORDER — LABETALOL HYDROCHLORIDE 200 MG/1
1 TABLET, FILM COATED ORAL
Refills: 0 | DISCHARGE

## 2025-02-20 RX ORDER — MAGNESIUM CARBONATE 54 MG/5 ML
0 LIQUID (ML) ORAL
Refills: 0 | DISCHARGE

## 2025-02-20 NOTE — ASU PREOP CHECKLIST - IDENTIFICATION BAND VERIFIED
I am not certain who changed it, but it was not directed by me.   Just FELICIANO Caballero PA-C 9/26/2019 2:45 PM           done

## 2025-02-20 NOTE — ASU DISCHARGE PLAN (ADULT/PEDIATRIC) - FINANCIAL ASSISTANCE
[facility] provides services at a reduced cost to those who are determined to be eligible through [facility]’s financial assistance program. Information regarding [facility]’s financial assistance program can be found by going to [email or web address] or by calling [phone].

## 2025-02-20 NOTE — PRE PROCEDURE NOTE - PRE PROCEDURE EVALUATION
HPI:    Here for EUS for history of pancreatic cyst.     PAST MEDICAL & SURGICAL HISTORY:  Seizure      Asthma      Malignant neoplasm of prostate      Hypertension      History of colon surgery      S/P hernia surgery      H/O laceration of skin        See chart.    MEDICATIONS:    See chart.     ALLERGIES:  No Known Allergies    See chart.     SOCIAL HISTORY:     Denies toxic habits.     FAMILY HISTORY:  FH: epilepsy (Mother)      Noncontributory.     REVIEW OF SYSTEMS:  CONSTITUTIONAL: No weakness, fevers or chills.  EYES/ENT: No visual changes;  No vertigo or throat pain.  NECK: No pain or stiffness.  RESPIRATORY: No cough, wheezing, hemoptysis; No shortness of breath.  CARDIOVASCULAR: No chest pain or palpitations.  GASTROINTESTINAL: As per HPI.   GENITOURINARY: No dysuria, frequency or hematuria.  NEUROLOGICAL: No numbness or weakness.  SKIN: No itching, rashes.      PHYSICAL EXAM:  VITAL SIGNS:  T(C): 36.6 (02-20-25 @ 10:25), Max: 36.6 (02-20-25 @ 10:25)  HR: 101 (02-20-25 @ 10:25) (101 - 101)  BP: 137/97 (02-20-25 @ 10:25) (137/97 - 137/97)  RR: 14 (02-20-25 @ 10:25) (14 - 14)  SpO2: 99% (02-20-25 @ 10:25) (99% - 99%)  I/Os:     See chart.     GENERAL: MK, non toxic, comfortable in bed  HEENT: EOMI, no icterus, no tracheal deviation, moist mucus membranes   CARDIO: Regular rate and rhythm, no murmurs, rubs or gallops  LUNGS: No wheezing, rales or rhonchi  ABDOMEN: Soft, non tender, non distended, no rebound or guarding  VASCULAR: Warm and well perfused without peripheral edema and palpable pulses  PSYCH AAO x 3, normal mood and affect   SKIN: warm, dry, intact     LABS:                 RADIOLOGY & ADDITIONAL TESTS: Reviewed.       Risks, benefits, and alternatives of the procedure discussed at length including but not limited to bleeding, pancreatitis, perforation, anesthesia related complication, infection, etc. Patient expressed understanding and agreeable for procedure. Patient stable for planned procedure.

## 2025-02-24 LAB — SURGICAL PATHOLOGY STUDY: SIGNIFICANT CHANGE UP

## 2025-02-25 ENCOUNTER — NON-APPOINTMENT (OUTPATIENT)
Age: 45
End: 2025-02-25

## 2025-02-28 LAB — NON-GYNECOLOGICAL CYTOLOGY STUDY: SIGNIFICANT CHANGE UP

## 2025-03-12 ENCOUNTER — APPOINTMENT (OUTPATIENT)
Dept: SURGICAL ONCOLOGY | Facility: CLINIC | Age: 45
End: 2025-03-12
Payer: MEDICAID

## 2025-03-12 VITALS
OXYGEN SATURATION: 98 % | HEIGHT: 68.5 IN | DIASTOLIC BLOOD PRESSURE: 85 MMHG | HEART RATE: 117 BPM | WEIGHT: 123 LBS | BODY MASS INDEX: 18.43 KG/M2 | SYSTOLIC BLOOD PRESSURE: 126 MMHG

## 2025-03-12 PROCEDURE — 99214 OFFICE O/P EST MOD 30 MIN: CPT

## 2025-03-12 RX ORDER — LEVETIRACETAM 500 MG/1
500 TABLET, FILM COATED ORAL
Refills: 0 | Status: ACTIVE | COMMUNITY

## 2025-08-12 ENCOUNTER — APPOINTMENT (OUTPATIENT)
Dept: GASTROENTEROLOGY | Facility: CLINIC | Age: 45
End: 2025-08-12
Payer: MEDICAID

## 2025-08-12 DIAGNOSIS — K86.2 CYST OF PANCREAS: ICD-10-CM

## 2025-08-12 PROCEDURE — 99214 OFFICE O/P EST MOD 30 MIN: CPT | Mod: 93

## (undated) DEVICE — UNDERPAD LINEN SAVER 17 X 24"

## (undated) DEVICE — VALVE ENDO SURESEAL II 0-5FR

## (undated) DEVICE — LUBRICATING JELLY HR ONE SHOT 3G

## (undated) DEVICE — PACK IV START WITH CHG

## (undated) DEVICE — FORMALIN CUPS 10% BUFFERED

## (undated) DEVICE — SYR LUER LOK 3CC

## (undated) DEVICE — MASK O2 MICROSTREAM SAMPLE LINE MED/LRG 10FT

## (undated) DEVICE — BIOPSY FORCEP COLD DISP

## (undated) DEVICE — CONTAINER FORMALIN 80ML YELLOW

## (undated) DEVICE — DRSG CURITY GAUZE SPONGE 4 X 4" 12-PLY NON-STERILE

## (undated) DEVICE — CONTAINER FORMALIN 10% 20ML

## (undated) DEVICE — TUBING IV SET GRAVITY 1Y 78" MACRO

## (undated) DEVICE — GOWN LG

## (undated) DEVICE — BITE BLOCK ADULT 20 X 27MM (GREEN)

## (undated) DEVICE — VENODYNE/SCD SLEEVE CALF MEDIUM

## (undated) DEVICE — SALIVA EJECTOR (BLUE)

## (undated) DEVICE — DRSG 2X2

## (undated) DEVICE — KIT ENDO PROCEDURE CUST 10/BX

## (undated) DEVICE — BIOPSY FORCEP RADIAL JAW 4 STANDARD WITH NEEDLE

## (undated) DEVICE — DRSG BANDAID 0.75X3"

## (undated) DEVICE — FORCEP RADIAL JAW 4 W NDL 2.2MM 2.8MM 240CM ORANGE DISP

## (undated) DEVICE — CLAMP BX HOT RAD JAW 3

## (undated) DEVICE — LABELS BLANK W PEN

## (undated) DEVICE — SOL IRR POUR NS 0.9% 500ML

## (undated) DEVICE — ANESTHESIA CIRCUIT ADULT HMEF

## (undated) DEVICE — NDL ASPIR EXPECT SLIMLINE 22G

## (undated) DEVICE — CATH IV SAFE BC 22G X 1" (BLUE)

## (undated) DEVICE — NDL HYPO SAFE 22G X 1" (BLACK)

## (undated) DEVICE — TUBING MEDI-VAC W MAXIGRIP CONNECTORS 1/4"X6'

## (undated) DEVICE — BASIN EMESIS 10IN GRADUATED MAUVE

## (undated) DEVICE — ELCTR ECG CONDUCTIVE ADHESIVE

## (undated) DEVICE — DENTURE CUP PINK

## (undated) DEVICE — SOL INJ NS 0.9% 500ML 1-PORT

## (undated) DEVICE — WARMING BLANKET FULL ADULT

## (undated) DEVICE — SOLIDIFIER ISOLYZER 2000 CC

## (undated) DEVICE — TUBING IV SET GRAVITY 3Y 100" MACRO